# Patient Record
Sex: MALE | Race: WHITE | Employment: OTHER | URBAN - METROPOLITAN AREA
[De-identification: names, ages, dates, MRNs, and addresses within clinical notes are randomized per-mention and may not be internally consistent; named-entity substitution may affect disease eponyms.]

---

## 2017-04-11 RX ORDER — ATORVASTATIN CALCIUM 20 MG/1
TABLET, FILM COATED ORAL
Qty: 90 TABLET | Refills: 1 | Status: SHIPPED | OUTPATIENT
Start: 2017-04-11 | End: 2017-09-30 | Stop reason: SDUPTHER

## 2017-04-20 ENCOUNTER — OFFICE VISIT (OUTPATIENT)
Dept: INTERNAL MEDICINE CLINIC | Age: 82
End: 2017-04-20

## 2017-04-20 VITALS
OXYGEN SATURATION: 99 % | DIASTOLIC BLOOD PRESSURE: 80 MMHG | HEIGHT: 64 IN | WEIGHT: 126 LBS | SYSTOLIC BLOOD PRESSURE: 127 MMHG | HEART RATE: 85 BPM | BODY MASS INDEX: 21.51 KG/M2

## 2017-04-20 DIAGNOSIS — D63.1 ANEMIA IN CHRONIC KIDNEY DISEASE: ICD-10-CM

## 2017-04-20 DIAGNOSIS — E78.00 PURE HYPERCHOLESTEROLEMIA: ICD-10-CM

## 2017-04-20 DIAGNOSIS — I10 ESSENTIAL HYPERTENSION: Primary | ICD-10-CM

## 2017-04-20 DIAGNOSIS — I12.9 HYPERTENSIVE KIDNEY DISEASE WITH STAGE 3 CHRONIC KIDNEY DISEASE (HCC): ICD-10-CM

## 2017-04-20 DIAGNOSIS — J30.0 VASOMOTOR RHINITIS: ICD-10-CM

## 2017-04-20 DIAGNOSIS — N18.30 HYPERTENSIVE KIDNEY DISEASE WITH STAGE 3 CHRONIC KIDNEY DISEASE (HCC): ICD-10-CM

## 2017-04-20 DIAGNOSIS — E03.4 HYPOTHYROIDISM DUE TO ACQUIRED ATROPHY OF THYROID: ICD-10-CM

## 2017-04-20 DIAGNOSIS — N18.9 ANEMIA IN CHRONIC KIDNEY DISEASE: ICD-10-CM

## 2017-04-20 PROBLEM — H40.10X0 OPEN-ANGLE GLAUCOMA: Status: ACTIVE | Noted: 2017-04-20

## 2017-04-20 PROBLEM — H26.9 CATARACT, RIGHT EYE: Status: ACTIVE | Noted: 2017-04-20

## 2017-04-20 PROBLEM — H35.30 MACULAR DEGENERATION: Status: ACTIVE | Noted: 2017-04-20

## 2017-04-20 PROCEDURE — 99214 OFFICE O/P EST MOD 30 MIN: CPT | Performed by: INTERNAL MEDICINE

## 2017-04-20 RX ORDER — LATANOPROST 50 UG/ML
1 SOLUTION/ DROPS OPHTHALMIC NIGHTLY
COMMUNITY
End: 2021-10-29

## 2017-04-24 DIAGNOSIS — E78.00 PURE HYPERCHOLESTEROLEMIA: ICD-10-CM

## 2017-04-24 DIAGNOSIS — E03.4 HYPOTHYROIDISM DUE TO ACQUIRED ATROPHY OF THYROID: ICD-10-CM

## 2017-04-24 DIAGNOSIS — D63.1 ANEMIA IN CHRONIC KIDNEY DISEASE: ICD-10-CM

## 2017-04-24 DIAGNOSIS — N18.9 ANEMIA IN CHRONIC KIDNEY DISEASE: ICD-10-CM

## 2017-04-24 DIAGNOSIS — I10 ESSENTIAL HYPERTENSION: ICD-10-CM

## 2017-04-24 LAB
A/G RATIO: 1.8 (ref 1.1–2.2)
ALBUMIN SERPL-MCNC: 4.2 G/DL (ref 3.4–5)
ALP BLD-CCNC: 101 U/L (ref 40–129)
ALT SERPL-CCNC: <5 U/L (ref 10–40)
ANION GAP SERPL CALCULATED.3IONS-SCNC: 13 MMOL/L (ref 3–16)
AST SERPL-CCNC: 7 U/L (ref 15–37)
BASOPHILS ABSOLUTE: 0 K/UL (ref 0–0.2)
BASOPHILS RELATIVE PERCENT: 0.7 %
BILIRUB SERPL-MCNC: 0.8 MG/DL (ref 0–1)
BUN BLDV-MCNC: 16 MG/DL (ref 7–20)
CALCIUM SERPL-MCNC: 8.6 MG/DL (ref 8.3–10.6)
CHLORIDE BLD-SCNC: 109 MMOL/L (ref 99–110)
CHOLESTEROL, TOTAL: 163 MG/DL (ref 0–199)
CO2: 23 MMOL/L (ref 21–32)
CREAT SERPL-MCNC: 1.3 MG/DL (ref 0.8–1.3)
EOSINOPHILS ABSOLUTE: 0.2 K/UL (ref 0–0.6)
EOSINOPHILS RELATIVE PERCENT: 3.1 %
FERRITIN: 403.9 NG/ML (ref 30–400)
GFR AFRICAN AMERICAN: >60
GFR NON-AFRICAN AMERICAN: 53
GLOBULIN: 2.3 G/DL
GLUCOSE BLD-MCNC: 85 MG/DL (ref 70–99)
HCT VFR BLD CALC: 41.6 % (ref 40.5–52.5)
HDLC SERPL-MCNC: 38 MG/DL (ref 40–60)
HEMOGLOBIN: 13.4 G/DL (ref 13.5–17.5)
IRON SATURATION: 30 % (ref 20–50)
IRON: 72 UG/DL (ref 59–158)
LDL CHOLESTEROL CALCULATED: 90 MG/DL
LYMPHOCYTES ABSOLUTE: 0.9 K/UL (ref 1–5.1)
LYMPHOCYTES RELATIVE PERCENT: 17.1 %
MCH RBC QN AUTO: 29.5 PG (ref 26–34)
MCHC RBC AUTO-ENTMCNC: 32.1 G/DL (ref 31–36)
MCV RBC AUTO: 91.8 FL (ref 80–100)
MONOCYTES ABSOLUTE: 0.7 K/UL (ref 0–1.3)
MONOCYTES RELATIVE PERCENT: 12.2 %
NEUTROPHILS ABSOLUTE: 3.7 K/UL (ref 1.7–7.7)
NEUTROPHILS RELATIVE PERCENT: 66.9 %
PDW BLD-RTO: 14.9 % (ref 12.4–15.4)
PLATELET # BLD: 255 K/UL (ref 135–450)
PMV BLD AUTO: 8.3 FL (ref 5–10.5)
POTASSIUM SERPL-SCNC: 4.5 MMOL/L (ref 3.5–5.1)
RBC # BLD: 4.53 M/UL (ref 4.2–5.9)
SODIUM BLD-SCNC: 145 MMOL/L (ref 136–145)
TOTAL IRON BINDING CAPACITY: 240 UG/DL (ref 260–445)
TOTAL PROTEIN: 6.5 G/DL (ref 6.4–8.2)
TRIGL SERPL-MCNC: 177 MG/DL (ref 0–150)
TSH SERPL DL<=0.05 MIU/L-ACNC: 2.67 UIU/ML (ref 0.27–4.2)
VLDLC SERPL CALC-MCNC: 35 MG/DL
WBC # BLD: 5.5 K/UL (ref 4–11)

## 2017-05-12 RX ORDER — BENAZEPRIL HYDROCHLORIDE 40 MG/1
TABLET, FILM COATED ORAL
Qty: 90 TABLET | Refills: 1 | Status: SHIPPED | OUTPATIENT
Start: 2017-05-12 | End: 2017-11-02 | Stop reason: SDUPTHER

## 2017-07-06 RX ORDER — LEVOTHYROXINE SODIUM 0.05 MG/1
TABLET ORAL
Qty: 90 TABLET | Refills: 1 | Status: SHIPPED | OUTPATIENT
Start: 2017-07-06 | End: 2017-12-26

## 2017-10-02 RX ORDER — ATORVASTATIN CALCIUM 20 MG/1
TABLET, FILM COATED ORAL
Qty: 90 TABLET | Refills: 1 | Status: SHIPPED | OUTPATIENT
Start: 2017-10-02 | End: 2017-10-30

## 2017-10-24 ENCOUNTER — OFFICE VISIT (OUTPATIENT)
Dept: INTERNAL MEDICINE CLINIC | Age: 82
End: 2017-10-24

## 2017-10-24 VITALS
DIASTOLIC BLOOD PRESSURE: 78 MMHG | OXYGEN SATURATION: 100 % | BODY MASS INDEX: 21.12 KG/M2 | SYSTOLIC BLOOD PRESSURE: 138 MMHG | WEIGHT: 124 LBS | HEART RATE: 68 BPM

## 2017-10-24 DIAGNOSIS — E03.4 HYPOTHYROIDISM DUE TO ACQUIRED ATROPHY OF THYROID: ICD-10-CM

## 2017-10-24 DIAGNOSIS — I73.9 CLAUDICATION OF BOTH LOWER EXTREMITIES (HCC): ICD-10-CM

## 2017-10-24 DIAGNOSIS — I12.9 HYPERTENSIVE KIDNEY DISEASE WITH STAGE 3 CHRONIC KIDNEY DISEASE (HCC): ICD-10-CM

## 2017-10-24 DIAGNOSIS — I10 ESSENTIAL HYPERTENSION: Primary | ICD-10-CM

## 2017-10-24 DIAGNOSIS — E78.00 PURE HYPERCHOLESTEROLEMIA: ICD-10-CM

## 2017-10-24 DIAGNOSIS — M79.671 PAIN IN BOTH FEET: ICD-10-CM

## 2017-10-24 DIAGNOSIS — J30.0 CHRONIC VASOMOTOR RHINITIS: ICD-10-CM

## 2017-10-24 DIAGNOSIS — M79.672 PAIN IN BOTH FEET: ICD-10-CM

## 2017-10-24 DIAGNOSIS — N18.30 HYPERTENSIVE KIDNEY DISEASE WITH STAGE 3 CHRONIC KIDNEY DISEASE (HCC): ICD-10-CM

## 2017-10-24 PROBLEM — R20.2 PARESTHESIA OF BOTH FEET: Status: ACTIVE | Noted: 2017-10-24

## 2017-10-24 PROCEDURE — 1036F TOBACCO NON-USER: CPT | Performed by: INTERNAL MEDICINE

## 2017-10-24 PROCEDURE — 1123F ACP DISCUSS/DSCN MKR DOCD: CPT | Performed by: INTERNAL MEDICINE

## 2017-10-24 PROCEDURE — G8484 FLU IMMUNIZE NO ADMIN: HCPCS | Performed by: INTERNAL MEDICINE

## 2017-10-24 PROCEDURE — 99214 OFFICE O/P EST MOD 30 MIN: CPT | Performed by: INTERNAL MEDICINE

## 2017-10-24 PROCEDURE — G8427 DOCREV CUR MEDS BY ELIG CLIN: HCPCS | Performed by: INTERNAL MEDICINE

## 2017-10-24 PROCEDURE — G8420 CALC BMI NORM PARAMETERS: HCPCS | Performed by: INTERNAL MEDICINE

## 2017-10-24 PROCEDURE — 4040F PNEUMOC VAC/ADMIN/RCVD: CPT | Performed by: INTERNAL MEDICINE

## 2017-10-24 ASSESSMENT — PATIENT HEALTH QUESTIONNAIRE - PHQ9
1. LITTLE INTEREST OR PLEASURE IN DOING THINGS: 0
SUM OF ALL RESPONSES TO PHQ9 QUESTIONS 1 & 2: 0
2. FEELING DOWN, DEPRESSED OR HOPELESS: 0
SUM OF ALL RESPONSES TO PHQ QUESTIONS 1-9: 0

## 2017-10-24 NOTE — PROGRESS NOTES
Assessment/Plan     1. Essential hypertension  Slightly high today in the office. He will go to his local fire station for BP check intermittently- call if > 140/90. DASH diet provided. - Comprehensive Metabolic Panel, Fasting; Future    2. Hypertensive kidney disease with stage 3 chronic kidney disease  Stable. He will continue to avoid NSAIDs and other nephrotoxic agents. 3. Pure hypercholesterolemia  Tolerating current dose(s) of Lipitor- continue.   - Lipid, Fasting; Future    4. Hypothyroidism due to acquired atrophy of thyroid  Weight loss is likely multi-factorial, but will adjust dose of Synthroid if indicated by TSH result.  - TSH without Reflex; Future    5. Chronic vasomotor rhinitis  Only symptomatic during meals. He will ask ophthalmologist whether it is safe to use a nasal steroid or atrovent spray. 6. Pain in both feet  Etiology unclear- if vascular studies negative, will refer to podiatry. - Vitamin B12; Future    7. Claudication of both lower extremities ([facility] [address]  Will refer to vascular surgery if ABIs abnormal.  - VL Ankle Art Brachial Indices Extremity Bilateral; Future      Discussed medications with patient, who voiced understanding of their use and indications. All questions answered. Return in about 6 months (around 4/24/2018). Cindi Lema   YOB: 1935    Date of Visit:  10/24/2017      Prior to Visit Medications    Medication Sig Taking?  Authorizing Provider   atorvastatin (LIPITOR) 20 MG tablet TAKE 1 TABLET BY MOUTH EVERY DAY Yes Kaden Raymond MD   levothyroxine (SYNTHROID) 50 MCG tablet TAKE 1 TABLET BY MOUTH EVERY DAILY Yes Kaden Raymond MD   benazepril (LOTENSIN) 40 MG tablet TAKE 1 TABLET BY MOUTH EVERY DAY Yes Kaden Raymond MD   latanoprost (XALATAN) 0.005 % ophthalmic solution 1 drop nightly Yes Historical Provider, MD   Brimonidine Tartrate-Timolol (COMBIGAN OP) Apply to eye Yes Historical Provider, MD   Brinzolamide (AZOPT OP) Apply to eye Yes Historical Provider, MD   amLODIPine (NORVASC) 10 MG tablet TAKE 1 TABLET BY MOUTH DAILY. Yes Rajan Boggs MD       Vitals:    10/24/17 1020 10/24/17 1022 10/24/17 1058   BP: (!) 148/80 (!) 146/80 138/78   Site:   Right Arm   Position:   Sitting   Pulse: 68     SpO2: 100%     Weight: 124 lb (56.2 kg)       Body mass index is 21.12 kg/m². Wt Readings from Last 3 Encounters:   10/24/17 124 lb (56.2 kg)   04/20/17 126 lb (57.2 kg)   10/20/16 130 lb (59 kg)     BP Readings from Last 3 Encounters:   10/24/17 138/78   04/20/17 127/80   10/20/16 135/80     CC:  Patient presents for follow-up of   1. Essential hypertension    2. Hypertensive kidney disease with stage 3 chronic kidney disease    3. Pure hypercholesterolemia    4. Hypothyroidism due to acquired atrophy of thyroid    5. Chronic vasomotor rhinitis    6. Pain in both feet    7. Claudication of both lower extremities (HCC)         HPI  Hyperlipidemia:  No new myalgias or GI upset on higher dose of atorvastatin (Lipitor). Medication compliance: compliant all of the time. Patient is following a low fat, low cholesterol diet. He is exercising regularly. Lab Results   Component Value Date    CHOL 163 04/24/2017    TRIG 177 (H) 04/24/2017    HDL 38 (L) 04/24/2017    LDLCALC 90 04/24/2017     Lab Results   Component Value Date    ALT <5 (L) 04/24/2017    AST 7 (L) 04/24/2017        Hypertension:  Home blood pressure monitoring: No.  He is adherent to a low sodium diet. Patient denies chest pain, shortness of breath, headache, lightheadedness and palpitations. Antihypertensive medication side effects: no medication side effects noted. Use of agents associated with hypertension: none.                                         Sodium (mmol/L)   Date Value   04/24/2017 145    BUN (mg/dL)   Date Value   04/24/2017 16    Glucose (mg/dL)   Date Value   04/24/2017 85      Potassium (mmol/L)   Date Value   04/24/2017 4.5    CREATININE (mg/dL)   Date Value He has no cervical adenopathy. Neurological: He is alert and oriented to person, place, and time. Skin: Skin is warm, dry and intact. No rash noted. No erythema. Loss on hair on LEs. Psychiatric: He has a normal mood and affect.  His speech is normal and behavior is normal. Judgment and thought content normal. Cognition and memory are normal.

## 2017-10-24 NOTE — PATIENT INSTRUCTIONS
using beans and peas. Add garbanzo or kidney beans to salads. Make burritos and tacos with mashed courtney beans or black beans. Where can you learn more? Go to [email or web address] org and sign in to your [email or web address] account. Enter P020 in the [employer] box to learn more about \"DASH Diet: Care Instructions. \"     If you do not have an account, please click on the \"Sign Up Now\" link. Current as of: April 3, 2017  Content Version: 11.3  © 8891-5553 [employer]. Care instructions adapted under license by [facility] ([facility]). If you have questions about a medical condition or this instruction, always ask your healthcare professional. [address] any warranty or liability for your use of this information.      Healthy Choice frozen dinners  Boost nutritional supplement

## 2017-10-27 ENCOUNTER — HOSPITAL ENCOUNTER (OUTPATIENT)
Dept: VASCULAR LAB | Age: 82
Discharge: HOME | End: 2017-10-27
Attending: INTERNAL MEDICINE | Admitting: INTERNAL MEDICINE

## 2017-10-27 DIAGNOSIS — I73.9 PERIPHERAL VASCULAR DISEASE (HCC): ICD-10-CM

## 2017-10-27 DIAGNOSIS — M79.671 PAIN IN BOTH FEET: ICD-10-CM

## 2017-10-27 DIAGNOSIS — M79.672 PAIN IN BOTH FEET: ICD-10-CM

## 2017-10-27 DIAGNOSIS — E78.00 PURE HYPERCHOLESTEROLEMIA: ICD-10-CM

## 2017-10-27 DIAGNOSIS — I73.9 CLAUDICATION (HCC): Primary | ICD-10-CM

## 2017-10-27 DIAGNOSIS — I10 ESSENTIAL HYPERTENSION: ICD-10-CM

## 2017-10-27 DIAGNOSIS — E03.4 HYPOTHYROIDISM DUE TO ACQUIRED ATROPHY OF THYROID: ICD-10-CM

## 2017-10-27 PROBLEM — E53.8 B12 DEFICIENCY: Status: ACTIVE | Noted: 2017-10-27

## 2017-10-27 LAB
A/G RATIO: 1.7 (ref 1.1–2.2)
ALBUMIN SERPL-MCNC: 4.2 G/DL (ref 3.4–5)
ALP BLD-CCNC: 97 U/L (ref 40–129)
ALT SERPL-CCNC: 6 U/L (ref 10–40)
ANION GAP SERPL CALCULATED.3IONS-SCNC: 13 MMOL/L (ref 3–16)
AST SERPL-CCNC: 11 U/L (ref 15–37)
BILIRUB SERPL-MCNC: 0.8 MG/DL (ref 0–1)
BUN BLDV-MCNC: 20 MG/DL (ref 7–20)
CALCIUM SERPL-MCNC: 9 MG/DL (ref 8.3–10.6)
CHLORIDE BLD-SCNC: 105 MMOL/L (ref 99–110)
CHOLESTEROL, FASTING: 160 MG/DL (ref 0–199)
CO2: 25 MMOL/L (ref 21–32)
CREAT SERPL-MCNC: 1.3 MG/DL (ref 0.8–1.3)
GFR AFRICAN AMERICAN: >60
GFR NON-AFRICAN AMERICAN: 53
GLOBULIN: 2.5 G/DL
GLUCOSE FASTING: 81 MG/DL (ref 70–99)
HDLC SERPL-MCNC: 41 MG/DL (ref 40–60)
LDL CHOLESTEROL CALCULATED: 94 MG/DL
POTASSIUM SERPL-SCNC: 4.3 MMOL/L (ref 3.5–5.1)
SODIUM BLD-SCNC: 143 MMOL/L (ref 136–145)
TOTAL PROTEIN: 6.7 G/DL (ref 6.4–8.2)
TRIGLYCERIDE, FASTING: 126 MG/DL (ref 0–150)
TSH SERPL DL<=0.05 MIU/L-ACNC: 2.67 UIU/ML (ref 0.27–4.2)
VITAMIN B-12: 200 PG/ML (ref 211–911)
VLDLC SERPL CALC-MCNC: 25 MG/DL

## 2017-10-30 ENCOUNTER — TELEPHONE (OUTPATIENT)
Dept: INTERNAL MEDICINE CLINIC | Age: 82
End: 2017-10-30

## 2017-10-30 RX ORDER — ATORVASTATIN CALCIUM 20 MG/1
TABLET, FILM COATED ORAL
Qty: 90 TABLET | Refills: 1 | Status: SHIPPED | OUTPATIENT
Start: 2017-10-30 | End: 2018-09-07 | Stop reason: SDUPTHER

## 2017-10-30 RX ORDER — LANOLIN ALCOHOL/MO/W.PET/CERES
1000 CREAM (GRAM) TOPICAL DAILY
COMMUNITY

## 2017-11-03 RX ORDER — BENAZEPRIL HYDROCHLORIDE 40 MG/1
TABLET, FILM COATED ORAL
Qty: 90 TABLET | Refills: 1 | Status: SHIPPED | OUTPATIENT
Start: 2017-11-03 | End: 2018-04-26 | Stop reason: SDUPTHER

## 2017-12-26 RX ORDER — LEVOTHYROXINE SODIUM 0.05 MG/1
TABLET ORAL
Qty: 90 TABLET | Refills: 1 | Status: SHIPPED | OUTPATIENT
Start: 2017-12-26 | End: 2018-12-02

## 2018-01-23 ENCOUNTER — OFFICE VISIT (OUTPATIENT)
Dept: INTERNAL MEDICINE CLINIC | Age: 83
End: 2018-01-23

## 2018-01-23 VITALS
SYSTOLIC BLOOD PRESSURE: 126 MMHG | OXYGEN SATURATION: 99 % | HEART RATE: 78 BPM | TEMPERATURE: 97.8 F | DIASTOLIC BLOOD PRESSURE: 66 MMHG | WEIGHT: 128 LBS | BODY MASS INDEX: 21.8 KG/M2

## 2018-01-23 DIAGNOSIS — R68.89 COLD INTOLERANCE: ICD-10-CM

## 2018-01-23 DIAGNOSIS — L85.3 XEROSIS OF SKIN: ICD-10-CM

## 2018-01-23 DIAGNOSIS — L24.9 IRRITANT DERMATITIS: Primary | ICD-10-CM

## 2018-01-23 PROCEDURE — G8484 FLU IMMUNIZE NO ADMIN: HCPCS | Performed by: INTERNAL MEDICINE

## 2018-01-23 PROCEDURE — 4040F PNEUMOC VAC/ADMIN/RCVD: CPT | Performed by: INTERNAL MEDICINE

## 2018-01-23 PROCEDURE — G8420 CALC BMI NORM PARAMETERS: HCPCS | Performed by: INTERNAL MEDICINE

## 2018-01-23 PROCEDURE — 1036F TOBACCO NON-USER: CPT | Performed by: INTERNAL MEDICINE

## 2018-01-23 PROCEDURE — G8427 DOCREV CUR MEDS BY ELIG CLIN: HCPCS | Performed by: INTERNAL MEDICINE

## 2018-01-23 PROCEDURE — 1123F ACP DISCUSS/DSCN MKR DOCD: CPT | Performed by: INTERNAL MEDICINE

## 2018-01-23 PROCEDURE — 99214 OFFICE O/P EST MOD 30 MIN: CPT | Performed by: INTERNAL MEDICINE

## 2018-01-23 RX ORDER — MOMETASONE FUROATE 1 MG/G
CREAM TOPICAL
Qty: 1 TUBE | Refills: 0 | Status: SHIPPED | OUTPATIENT
Start: 2018-01-23 | End: 2019-04-30 | Stop reason: ALTCHOICE

## 2018-04-20 ENCOUNTER — OFFICE VISIT (OUTPATIENT)
Dept: INTERNAL MEDICINE CLINIC | Age: 83
End: 2018-04-20

## 2018-04-20 VITALS
WEIGHT: 131 LBS | DIASTOLIC BLOOD PRESSURE: 78 MMHG | SYSTOLIC BLOOD PRESSURE: 126 MMHG | BODY MASS INDEX: 22.31 KG/M2 | HEART RATE: 72 BPM

## 2018-04-20 DIAGNOSIS — I10 ESSENTIAL HYPERTENSION: ICD-10-CM

## 2018-04-20 DIAGNOSIS — N18.30 HYPERTENSIVE KIDNEY DISEASE WITH STAGE 3 CHRONIC KIDNEY DISEASE (HCC): ICD-10-CM

## 2018-04-20 DIAGNOSIS — E78.00 PURE HYPERCHOLESTEROLEMIA: Primary | ICD-10-CM

## 2018-04-20 DIAGNOSIS — E03.4 HYPOTHYROIDISM DUE TO ACQUIRED ATROPHY OF THYROID: ICD-10-CM

## 2018-04-20 DIAGNOSIS — E53.8 B12 DEFICIENCY: ICD-10-CM

## 2018-04-20 DIAGNOSIS — I12.9 HYPERTENSIVE KIDNEY DISEASE WITH STAGE 3 CHRONIC KIDNEY DISEASE (HCC): ICD-10-CM

## 2018-04-20 PROBLEM — R20.2 PARESTHESIA OF BOTH FEET: Status: RESOLVED | Noted: 2017-10-24 | Resolved: 2018-04-20

## 2018-04-20 PROCEDURE — 4040F PNEUMOC VAC/ADMIN/RCVD: CPT | Performed by: INTERNAL MEDICINE

## 2018-04-20 PROCEDURE — 1036F TOBACCO NON-USER: CPT | Performed by: INTERNAL MEDICINE

## 2018-04-20 PROCEDURE — G8427 DOCREV CUR MEDS BY ELIG CLIN: HCPCS | Performed by: INTERNAL MEDICINE

## 2018-04-20 PROCEDURE — 99214 OFFICE O/P EST MOD 30 MIN: CPT | Performed by: INTERNAL MEDICINE

## 2018-04-20 PROCEDURE — 1123F ACP DISCUSS/DSCN MKR DOCD: CPT | Performed by: INTERNAL MEDICINE

## 2018-04-20 PROCEDURE — G8420 CALC BMI NORM PARAMETERS: HCPCS | Performed by: INTERNAL MEDICINE

## 2018-04-20 RX ORDER — DOCUSATE SODIUM 100 MG/1
100 CAPSULE, LIQUID FILLED ORAL 2 TIMES DAILY
COMMUNITY

## 2018-04-24 ENCOUNTER — TELEPHONE (OUTPATIENT)
Dept: INTERNAL MEDICINE CLINIC | Age: 83
End: 2018-04-24

## 2018-04-26 RX ORDER — BENAZEPRIL HYDROCHLORIDE 40 MG/1
TABLET, FILM COATED ORAL
Qty: 90 TABLET | Refills: 1 | Status: SHIPPED | OUTPATIENT
Start: 2018-04-26 | End: 2018-10-20 | Stop reason: SDUPTHER

## 2018-09-07 RX ORDER — ATORVASTATIN CALCIUM 20 MG/1
TABLET, FILM COATED ORAL
Qty: 90 TABLET | Refills: 1 | Status: SHIPPED | OUTPATIENT
Start: 2018-09-07 | End: 2019-02-25 | Stop reason: SDUPTHER

## 2018-09-27 ENCOUNTER — HOSPITAL ENCOUNTER (OUTPATIENT)
Dept: NEUROLOGY | Age: 83
Discharge: HOME | End: 2018-09-27
Payer: MEDICARE

## 2018-09-27 PROCEDURE — 95861 NEEDLE EMG 2 EXTREMITIES: CPT

## 2018-09-27 PROCEDURE — 95909 NRV CNDJ TST 5-6 STUDIES: CPT

## 2018-09-27 PROCEDURE — 95886 MUSC TEST DONE W/N TEST COMP: CPT | Performed by: PSYCHIATRY & NEUROLOGY

## 2018-09-27 PROCEDURE — 95909 NRV CNDJ TST 5-6 STUDIES: CPT | Performed by: PSYCHIATRY & NEUROLOGY

## 2018-10-20 RX ORDER — BENAZEPRIL HYDROCHLORIDE 40 MG/1
TABLET, FILM COATED ORAL
Qty: 90 TABLET | Refills: 1 | Status: SHIPPED | OUTPATIENT
Start: 2018-10-20 | End: 2019-04-13 | Stop reason: SDUPTHER

## 2018-10-26 ENCOUNTER — OFFICE VISIT (OUTPATIENT)
Dept: INTERNAL MEDICINE CLINIC | Age: 83
End: 2018-10-26
Payer: MEDICARE

## 2018-10-26 VITALS
OXYGEN SATURATION: 98 % | WEIGHT: 124 LBS | SYSTOLIC BLOOD PRESSURE: 136 MMHG | BODY MASS INDEX: 21.17 KG/M2 | HEIGHT: 64 IN | HEART RATE: 80 BPM | DIASTOLIC BLOOD PRESSURE: 80 MMHG

## 2018-10-26 DIAGNOSIS — R63.4 WEIGHT LOSS: ICD-10-CM

## 2018-10-26 DIAGNOSIS — E03.4 HYPOTHYROIDISM DUE TO ACQUIRED ATROPHY OF THYROID: ICD-10-CM

## 2018-10-26 DIAGNOSIS — E78.00 PURE HYPERCHOLESTEROLEMIA: ICD-10-CM

## 2018-10-26 DIAGNOSIS — N18.30 HYPERTENSIVE KIDNEY DISEASE WITH STAGE 3 CHRONIC KIDNEY DISEASE (HCC): ICD-10-CM

## 2018-10-26 DIAGNOSIS — I10 ESSENTIAL HYPERTENSION: Primary | ICD-10-CM

## 2018-10-26 DIAGNOSIS — I10 ESSENTIAL HYPERTENSION: ICD-10-CM

## 2018-10-26 DIAGNOSIS — I12.9 HYPERTENSIVE KIDNEY DISEASE WITH STAGE 3 CHRONIC KIDNEY DISEASE (HCC): ICD-10-CM

## 2018-10-26 LAB
A/G RATIO: 1.8 (ref 1.1–2.2)
ALBUMIN SERPL-MCNC: 4.4 G/DL (ref 3.4–5)
ALP BLD-CCNC: 108 U/L (ref 40–129)
ALT SERPL-CCNC: <5 U/L (ref 10–40)
ANION GAP SERPL CALCULATED.3IONS-SCNC: 15 MMOL/L (ref 3–16)
AST SERPL-CCNC: 7 U/L (ref 15–37)
BILIRUB SERPL-MCNC: 0.7 MG/DL (ref 0–1)
BUN BLDV-MCNC: 21 MG/DL (ref 7–20)
CALCIUM SERPL-MCNC: 9.1 MG/DL (ref 8.3–10.6)
CHLORIDE BLD-SCNC: 109 MMOL/L (ref 99–110)
CHOLESTEROL, FASTING: 167 MG/DL (ref 0–199)
CO2: 23 MMOL/L (ref 21–32)
CREAT SERPL-MCNC: 1.6 MG/DL (ref 0.8–1.3)
GFR AFRICAN AMERICAN: 50
GFR NON-AFRICAN AMERICAN: 41
GLOBULIN: 2.5 G/DL
GLUCOSE FASTING: 92 MG/DL (ref 70–99)
HDLC SERPL-MCNC: 44 MG/DL (ref 40–60)
LDL CHOLESTEROL CALCULATED: 99 MG/DL
POTASSIUM SERPL-SCNC: 5 MMOL/L (ref 3.5–5.1)
SODIUM BLD-SCNC: 147 MMOL/L (ref 136–145)
TOTAL PROTEIN: 6.9 G/DL (ref 6.4–8.2)
TRIGLYCERIDE, FASTING: 118 MG/DL (ref 0–150)
TSH SERPL DL<=0.05 MIU/L-ACNC: 2.35 UIU/ML (ref 0.27–4.2)
VLDLC SERPL CALC-MCNC: 24 MG/DL

## 2018-10-26 PROCEDURE — G8482 FLU IMMUNIZE ORDER/ADMIN: HCPCS | Performed by: INTERNAL MEDICINE

## 2018-10-26 PROCEDURE — G8427 DOCREV CUR MEDS BY ELIG CLIN: HCPCS | Performed by: INTERNAL MEDICINE

## 2018-10-26 PROCEDURE — 4040F PNEUMOC VAC/ADMIN/RCVD: CPT | Performed by: INTERNAL MEDICINE

## 2018-10-26 PROCEDURE — 1036F TOBACCO NON-USER: CPT | Performed by: INTERNAL MEDICINE

## 2018-10-26 PROCEDURE — 1101F PT FALLS ASSESS-DOCD LE1/YR: CPT | Performed by: INTERNAL MEDICINE

## 2018-10-26 PROCEDURE — 1123F ACP DISCUSS/DSCN MKR DOCD: CPT | Performed by: INTERNAL MEDICINE

## 2018-10-26 PROCEDURE — 99214 OFFICE O/P EST MOD 30 MIN: CPT | Performed by: INTERNAL MEDICINE

## 2018-10-26 PROCEDURE — G8420 CALC BMI NORM PARAMETERS: HCPCS | Performed by: INTERNAL MEDICINE

## 2018-10-26 RX ORDER — LANOLIN ALCOHOL/MO/W.PET/CERES
3 CREAM (GRAM) TOPICAL DAILY
COMMUNITY
Start: 2018-10-26 | End: 2019-04-30

## 2018-10-26 ASSESSMENT — PATIENT HEALTH QUESTIONNAIRE - PHQ9
1. LITTLE INTEREST OR PLEASURE IN DOING THINGS: 0
SUM OF ALL RESPONSES TO PHQ QUESTIONS 1-9: 0
2. FEELING DOWN, DEPRESSED OR HOPELESS: 0
SUM OF ALL RESPONSES TO PHQ QUESTIONS 1-9: 0
SUM OF ALL RESPONSES TO PHQ9 QUESTIONS 1 & 2: 0

## 2018-10-26 NOTE — PROGRESS NOTES
Assessment/Plan     1. Essential hypertension  Well-controlled. Continue current medications. - Comprehensive Metabolic Panel, Fasting; Future    2. Pure hypercholesterolemia  Tolerating current dose(s) of Lipitor- continue.  - Lipid, Fasting; Future    3. Hypertensive kidney disease with stage 3 chronic kidney disease ([address]  Taking Advil PM nightly, which he was advised to discontinue  Tylenol okay for pain. Melatonin recommended if needs sleep aid. 4. Hypothyroidism due to acquired atrophy of thyroid  Weight loss is likely multifactorial, but will adjust levothyroxine dose if indicated by lab results.   - TSH without Reflex; Future      Return in about 6 months (around 4/26/2019). Satish Argueta   YOB: 1935    Date of Visit:  10/26/2018    No Known Allergies   Prior to Visit Medications    Medication Sig Taking? Authorizing Provider   benazepril (LOTENSIN) 40 MG tablet TAKE 1 TABLET BY MOUTH EVERY DAY Yes Alia Flores MD   atorvastatin (LIPITOR) 20 MG tablet TAKE 1 TABLET BY MOUTH EVERY DAY Yes Alia Flores MD   Lutein-Zeaxanthin (OCUVITE LUTEIN 25 PO) Take by mouth Yes Historical Provider, MD   docusate sodium (COLACE) 100 MG capsule Take 100 mg by mouth 2 times daily Yes Historical Provider, MD   levothyroxine (SYNTHROID) 50 MCG tablet TAKE 1 TABLET BY MOUTH EVERY DAILY Yes Alia Flores MD   vitamin B-12 (CYANOCOBALAMIN) 1000 MCG tablet Take 1,000 mcg by mouth daily Yes Historical Provider, MD   latanoprost (XALATAN) 0.005 % ophthalmic solution 1 drop nightly Yes Historical Provider, MD   Brimonidine Tartrate-Timolol (COMBIGAN OP) Apply to eye Yes Historical Provider, MD   Brinzolamide (AZOPT OP) Apply to eye Yes Historical Provider, MD   Multiple Vitamins-Minerals (ICAPS AREDS 2 PO) Take by mouth  Historical Provider, MD   mometasone (ELOCON) 0.1 % cream Apply thin layer to affected areas topically 2x/day up to 2 weeks. Avoid use on face and groin.   Alia Flores

## 2018-10-30 ENCOUNTER — TELEPHONE (OUTPATIENT)
Dept: INTERNAL MEDICINE CLINIC | Age: 83
End: 2018-10-30

## 2018-11-01 ENCOUNTER — TELEPHONE (OUTPATIENT)
Dept: INTERNAL MEDICINE CLINIC | Age: 83
End: 2018-11-01

## 2018-11-01 RX ORDER — TRAZODONE HYDROCHLORIDE 50 MG/1
50 TABLET ORAL NIGHTLY
Qty: 30 TABLET | Refills: 2 | Status: SHIPPED | OUTPATIENT
Start: 2018-11-01 | End: 2019-04-30 | Stop reason: SDUPTHER

## 2018-12-02 RX ORDER — LEVOTHYROXINE SODIUM 0.05 MG/1
TABLET ORAL
Qty: 90 TABLET | Refills: 1 | Status: SHIPPED | OUTPATIENT
Start: 2018-12-02 | End: 2019-05-23

## 2019-02-26 RX ORDER — ATORVASTATIN CALCIUM 20 MG/1
TABLET, FILM COATED ORAL
Qty: 90 TABLET | Refills: 1 | Status: SHIPPED | OUTPATIENT
Start: 2019-02-26 | End: 2019-08-22 | Stop reason: SDUPTHER

## 2019-04-14 RX ORDER — BENAZEPRIL HYDROCHLORIDE 40 MG/1
TABLET, FILM COATED ORAL
Qty: 90 TABLET | Refills: 1 | Status: SHIPPED | OUTPATIENT
Start: 2019-04-14 | End: 2019-10-14 | Stop reason: SDUPTHER

## 2019-04-30 ENCOUNTER — OFFICE VISIT (OUTPATIENT)
Dept: INTERNAL MEDICINE CLINIC | Age: 84
End: 2019-04-30
Payer: MEDICARE

## 2019-04-30 VITALS
SYSTOLIC BLOOD PRESSURE: 120 MMHG | OXYGEN SATURATION: 97 % | DIASTOLIC BLOOD PRESSURE: 64 MMHG | BODY MASS INDEX: 21.8 KG/M2 | HEART RATE: 72 BPM | WEIGHT: 127 LBS

## 2019-04-30 DIAGNOSIS — E03.4 HYPOTHYROIDISM DUE TO ACQUIRED ATROPHY OF THYROID: ICD-10-CM

## 2019-04-30 DIAGNOSIS — I12.9 HYPERTENSIVE KIDNEY DISEASE WITH STAGE 3 CHRONIC KIDNEY DISEASE (HCC): ICD-10-CM

## 2019-04-30 DIAGNOSIS — N18.30 HYPERTENSIVE KIDNEY DISEASE WITH STAGE 3 CHRONIC KIDNEY DISEASE (HCC): ICD-10-CM

## 2019-04-30 DIAGNOSIS — I10 ESSENTIAL HYPERTENSION: Primary | ICD-10-CM

## 2019-04-30 DIAGNOSIS — E78.00 PURE HYPERCHOLESTEROLEMIA: ICD-10-CM

## 2019-04-30 DIAGNOSIS — F51.01 PRIMARY INSOMNIA: ICD-10-CM

## 2019-04-30 PROCEDURE — 4040F PNEUMOC VAC/ADMIN/RCVD: CPT | Performed by: INTERNAL MEDICINE

## 2019-04-30 PROCEDURE — G8420 CALC BMI NORM PARAMETERS: HCPCS | Performed by: INTERNAL MEDICINE

## 2019-04-30 PROCEDURE — 1036F TOBACCO NON-USER: CPT | Performed by: INTERNAL MEDICINE

## 2019-04-30 PROCEDURE — 99214 OFFICE O/P EST MOD 30 MIN: CPT | Performed by: INTERNAL MEDICINE

## 2019-04-30 PROCEDURE — G8427 DOCREV CUR MEDS BY ELIG CLIN: HCPCS | Performed by: INTERNAL MEDICINE

## 2019-04-30 PROCEDURE — 1123F ACP DISCUSS/DSCN MKR DOCD: CPT | Performed by: INTERNAL MEDICINE

## 2019-04-30 RX ORDER — TRAZODONE HYDROCHLORIDE 50 MG/1
TABLET ORAL
Qty: 30 TABLET | Refills: 2 | Status: SHIPPED | OUTPATIENT
Start: 2019-04-30 | End: 2019-04-30 | Stop reason: SDUPTHER

## 2019-04-30 RX ORDER — TRAZODONE HYDROCHLORIDE 50 MG/1
TABLET ORAL
Qty: 90 TABLET | Refills: 1 | Status: SHIPPED | OUTPATIENT
Start: 2019-04-30 | End: 2019-08-16

## 2019-04-30 ASSESSMENT — PATIENT HEALTH QUESTIONNAIRE - PHQ9
SUM OF ALL RESPONSES TO PHQ9 QUESTIONS 1 & 2: 0
1. LITTLE INTEREST OR PLEASURE IN DOING THINGS: 0
SUM OF ALL RESPONSES TO PHQ QUESTIONS 1-9: 0
2. FEELING DOWN, DEPRESSED OR HOPELESS: 0
SUM OF ALL RESPONSES TO PHQ QUESTIONS 1-9: 0

## 2019-04-30 NOTE — PROGRESS NOTES
Assessment/Plan      1. Essential hypertension  Well-controlled. Continue current medications. - Comprehensive Metabolic Panel, Fasting; Future    2. Hypertensive kidney disease with stage 3 chronic kidney disease (HCC)  Recent exacerbation likely due to prior regular use of Advil pm, which he has discontinued. He     3. Pure hypercholesterolemia  Tolerating current dose(s) of Lipitor- continue.   - Lipid, Fasting; Future    4. Hypothyroidism due to acquired atrophy of thyroid  Clinically euthyroid, but will adjust levothyroxine dose if indicated by lab results.  - TSH without Reflex; Future    5. Primary insomnia  Dangers of anticholinergic drugs, such as Unisom discussed- may be increasing his visual hallucinations related to his visual loss. Since melatonin ineffective, so he agrees to try 25-50 mg trazodone qhs. Principles of good sleep hygiene discussed. Return in about 6 months (around 10/30/2019). Lino Caldwell   YOB: 1935    Date of Visit:  4/30/2019    No Known Allergies   Prior to Visit Medications    Medication Sig Taking?  Authorizing Provider   Doxylamine Succinate, Sleep, (UNISOM PO) Take by mouth Yes Historical Provider, MD   benazepril (LOTENSIN) 40 MG tablet TAKE 1 TABLET BY MOUTH EVERY DAY Yes Inocencia Bartlett MD   atorvastatin (LIPITOR) 20 MG tablet TAKE 1 TABLET BY MOUTH EVERY DAY Yes Inocencia Bartlett MD   levothyroxine (SYNTHROID) 50 MCG tablet TAKE 1 TABLET BY MOUTH EVERY DAILY Yes Inocencia Bartlett MD   traZODone (DESYREL) 50 MG tablet Take 1 tablet by mouth nightly Yes Inocencia Bartlett MD   Multiple Vitamins-Minerals (ICAPS AREDS 2 PO) Take by mouth Yes Historical Provider, MD   Lutein-Zeaxanthin (OCUVITE LUTEIN 25 PO) Take by mouth Yes Historical Provider, MD   docusate sodium (COLACE) 100 MG capsule Take 100 mg by mouth 2 times daily Yes Historical Provider, MD   vitamin B-12 (CYANOCOBALAMIN) 1000 MCG tablet Take 1,000 mcg by mouth daily Yes Historical Provider, MD   latanoprost (XALATAN) 0.005 % ophthalmic solution 1 drop nightly Yes Historical Provider, MD   Brimonidine Tartrate-Timolol (COMBIGAN OP) Apply to eye Yes Historical Provider, MD   Brinzolamide (AZOPT OP) Apply to eye Yes Historical Provider, MD   melatonin (RA MELATONIN) 3 MG TABS tablet Take 1 tablet by mouth daily  Lee Reyes MD       Vitals:    04/30/19 1102   BP: 120/64   Pulse: 72   SpO2: 97%   Weight: 127 lb (57.6 kg)      Body mass index is 21.8 kg/m². Wt Readings from Last 3 Encounters:   04/30/19 127 lb (57.6 kg)   10/26/18 124 lb (56.2 kg)   04/20/18 131 lb (59.4 kg)     BP Readings from Last 3 Encounters:   04/30/19 120/64   10/26/18 136/80   04/20/18 126/78       CC:  Patient presents for re-evaluation of the following medical concerns     HPI  Hypertension/CKD:  Home blood pressure monitoring: No.  He is adherent to a low sodium diet. Patient denies chest pain, shortness of breath, headache, lightheadedness and palpitations. Antihypertensive medication side effects: no medication side effects noted. Use of agents associated with hypertension: none. Hyperlipidemia:  No new myalgias or GI upset on higher dose of atorvastatin (Lipitor). Medication compliance: compliant all of the time. Patient is not following a low fat, low cholesterol diet. He is exercising regularly- walking dog 4x/day. Hypothyroidism: Recent symptoms:  None. He denies fatigue, weight gain, palpitations, constipation and diarrhea. Patient is taking his medication consistently on an empty stomach. Insomnia:  Current treatment: Unisom 1 qhs, which has been effective. Medication side effects: increase in visual hallucinations. He forgot to  the trazodone at his pharmacy.     Lab Results   Component Value Date    LDLCALC 99 10/26/2018    LDLCALC 94 10/27/2017    TRIGLYCFAST 118 10/26/2018    TRIGLYCFAST 126 10/27/2017    TRIG 177 (H) 04/24/2017    HDL 44 10/26/2018     Lab Results   Component Value Date    GLUF 92 10/26/2018    GLUCOSE 85 2017     Lab Results   Component Value Date     (H) 10/26/2018    K 5.0 10/26/2018    BUN 21 (H) 10/26/2018    CREATININE 1.6 (H) 10/26/2018    LABGLOM 41 (A) 10/26/2018    GFRAA 50 (A) 10/26/2018    CALCIUM 9.1 10/26/2018     Lab Results   Component Value Date    ALT <5 (L) 10/26/2018    AST 7 (L) 10/26/2018     Lab Results   Component Value Date    HGB 13.4 (L) 2017     Lab Results   Component Value Date    TSHREFLEX 1.98 2013    TSH 2.35 10/26/2018        Review of Systems  As documented inHPI    Social History     Tobacco Use    Smoking status: Former Smoker     Last attempt to quit: 1981     Years since quittin.2    Smokeless tobacco: Never Used   Substance Use Topics    Alcohol use: No     Comment: past history of alcoholism, quit 1981        Physical Exam   Constitutional: He is oriented to person, place, and time. He appears well-developed and well-nourished. No distress. HENT:   Mouth/Throat: Oropharynx is clear and moist and mucous membranes are normal.   Eyes: Conjunctivae are normal.   Left eye artificial   Neck: Neck supple. No JVD present. Carotid bruit is not present. No thyroid mass and no thyromegaly present. Cardiovascular: Normal rate, regular rhythm, normal heart sounds and intact distal pulses. Exam reveals no gallop and no friction rub. No murmur heard. Pulmonary/Chest: Effort normal and breath sounds normal. No respiratory distress. He has no wheezes. He has no rhonchi. He has no rales. Abdominal: Soft. He exhibits no distension. There is no tenderness. Musculoskeletal: He exhibits no edema. Lymphadenopathy:     He has no cervical adenopathy. Neurological: He is alert and oriented to person, place, and time. Skin: Skin is warm, dry and intact. No rash noted. No erythema. Psychiatric: He has a normal mood and affect.  His speech is normal and behavior is normal. Judgment and thought content normal. Cognition and memory are normal.

## 2019-05-01 ENCOUNTER — TELEPHONE (OUTPATIENT)
Dept: INTERNAL MEDICINE CLINIC | Age: 84
End: 2019-05-01

## 2019-05-01 NOTE — TELEPHONE ENCOUNTER
Patient states he's returning a call to the office regarding his lab results. Per Dr. Angelique Perez, I informed patient of the following:  Notes recorded by Pina Obrien MD on 5/1/2019 at 12:38 PM EDT  Thyroid function test is normal.  Continue current dose of Synthroid.  Liver function and electrolytes look fine. Dicie Masodo function is slightly better with discontinuation of Advil pm.  Blood sugar is slightly elevated, but nothing to worry about.  Cholesterol panel looks fine.  Continue current dose of Lipitor.  I will release results to [employer] once patient has been notified. Patient voiced understanding and has no additional questions. Please release results to my chart.     Phone Encounter Completed unless you need patient for something additional.

## 2019-05-23 RX ORDER — LEVOTHYROXINE SODIUM 0.05 MG/1
TABLET ORAL
Qty: 90 TABLET | Refills: 1 | Status: SHIPPED | OUTPATIENT
Start: 2019-05-23 | End: 2019-12-30 | Stop reason: SDUPTHER

## 2019-08-16 ENCOUNTER — TELEPHONE (OUTPATIENT)
Dept: INTERNAL MEDICINE CLINIC | Age: 84
End: 2019-08-16

## 2019-08-16 RX ORDER — MIRTAZAPINE 7.5 MG/1
7.5 TABLET, FILM COATED ORAL NIGHTLY
Qty: 90 TABLET | Refills: 0 | Status: SHIPPED | OUTPATIENT
Start: 2019-08-16 | End: 2019-10-08 | Stop reason: SDUPTHER

## 2019-08-16 NOTE — TELEPHONE ENCOUNTER
Ambien is contraindicated in his age group due to risk of falls and confusion/memory issues. Is he taking the trazodone?

## 2019-08-22 ENCOUNTER — TELEPHONE (OUTPATIENT)
Dept: INTERNAL MEDICINE CLINIC | Age: 84
End: 2019-08-22

## 2019-08-22 DIAGNOSIS — F51.01 PRIMARY INSOMNIA: Primary | ICD-10-CM

## 2019-08-22 RX ORDER — ATORVASTATIN CALCIUM 20 MG/1
TABLET, FILM COATED ORAL
Qty: 90 TABLET | Refills: 1 | Status: SHIPPED | OUTPATIENT
Start: 2019-08-22 | End: 2020-01-27 | Stop reason: SDUPTHER

## 2019-08-22 NOTE — TELEPHONE ENCOUNTER
Spoke with office he is on cancel list and will contact physician and then contact patient.   Patient aware of what is going on

## 2019-08-26 ENCOUNTER — OFFICE VISIT (OUTPATIENT)
Dept: PULMONOLOGY | Age: 84
End: 2019-08-26
Payer: MEDICARE

## 2019-08-26 VITALS
HEART RATE: 77 BPM | DIASTOLIC BLOOD PRESSURE: 85 MMHG | BODY MASS INDEX: 21.11 KG/M2 | SYSTOLIC BLOOD PRESSURE: 132 MMHG | OXYGEN SATURATION: 98 % | WEIGHT: 123 LBS

## 2019-08-26 DIAGNOSIS — F51.01 PRIMARY INSOMNIA: Primary | ICD-10-CM

## 2019-08-26 DIAGNOSIS — I10 ESSENTIAL HYPERTENSION: Chronic | ICD-10-CM

## 2019-08-26 DIAGNOSIS — E03.4 HYPOTHYROIDISM DUE TO ACQUIRED ATROPHY OF THYROID: Chronic | ICD-10-CM

## 2019-08-26 DIAGNOSIS — K21.9 GASTROESOPHAGEAL REFLUX DISEASE, ESOPHAGITIS PRESENCE NOT SPECIFIED: Chronic | ICD-10-CM

## 2019-08-26 PROCEDURE — 4040F PNEUMOC VAC/ADMIN/RCVD: CPT | Performed by: INTERNAL MEDICINE

## 2019-08-26 PROCEDURE — 99204 OFFICE O/P NEW MOD 45 MIN: CPT | Performed by: INTERNAL MEDICINE

## 2019-08-26 PROCEDURE — G8427 DOCREV CUR MEDS BY ELIG CLIN: HCPCS | Performed by: INTERNAL MEDICINE

## 2019-08-26 PROCEDURE — G8420 CALC BMI NORM PARAMETERS: HCPCS | Performed by: INTERNAL MEDICINE

## 2019-08-26 PROCEDURE — 1123F ACP DISCUSS/DSCN MKR DOCD: CPT | Performed by: INTERNAL MEDICINE

## 2019-08-26 PROCEDURE — 1036F TOBACCO NON-USER: CPT | Performed by: INTERNAL MEDICINE

## 2019-08-26 RX ORDER — TRAZODONE HYDROCHLORIDE 100 MG/1
100 TABLET ORAL NIGHTLY
COMMUNITY
End: 2019-10-07

## 2019-08-26 ASSESSMENT — SLEEP AND FATIGUE QUESTIONNAIRES
HOW LIKELY ARE YOU TO NOD OFF OR FALL ASLEEP WHILE WATCHING TV: 0
NECK CIRCUMFERENCE (INCHES): 15
HOW LIKELY ARE YOU TO NOD OFF OR FALL ASLEEP WHILE SITTING AND READING: 0
ESS TOTAL SCORE: 2
HOW LIKELY ARE YOU TO NOD OFF OR FALL ASLEEP IN A CAR, WHILE STOPPED FOR A FEW MINUTES IN TRAFFIC: 0
HOW LIKELY ARE YOU TO NOD OFF OR FALL ASLEEP WHILE SITTING AND TALKING TO SOMEONE: 0
HOW LIKELY ARE YOU TO NOD OFF OR FALL ASLEEP WHILE SITTING INACTIVE IN A PUBLIC PLACE: 0
HOW LIKELY ARE YOU TO NOD OFF OR FALL ASLEEP WHILE SITTING QUIETLY AFTER LUNCH WITHOUT ALCOHOL: 1
HOW LIKELY ARE YOU TO NOD OFF OR FALL ASLEEP WHILE LYING DOWN TO REST IN THE AFTERNOON WHEN CIRCUMSTANCES PERMIT: 1
HOW LIKELY ARE YOU TO NOD OFF OR FALL ASLEEP WHEN YOU ARE A PASSENGER IN A CAR FOR AN HOUR WITHOUT A BREAK: 0

## 2019-08-26 ASSESSMENT — ENCOUNTER SYMPTOMS
ALLERGIC/IMMUNOLOGIC NEGATIVE: 1
PHOTOPHOBIA: 0
RHINORRHEA: 0
VOMITING: 0
CHOKING: 0
ABDOMINAL DISTENTION: 0
NAUSEA: 0
SHORTNESS OF BREATH: 0
CHEST TIGHTNESS: 0
EYE PAIN: 0
ABDOMINAL PAIN: 0
APNEA: 0

## 2019-08-26 NOTE — LETTER
[facility]  [address] [place] [address]  Phone: [phone]  Fax: [phone]      August 26, 2019       Patient: Maryse Catherine   MR Number: [id number]   YOB: 1935   Date of Visit: 8/26/2019     Thank you for allowing me to participate in the care of Charity Zuniga. Here is my assessment and plan. Also attached is a copy of his consult note:    ASSESSMENT:  Visit Diagnoses and Associated Orders     Primary insomnia   (New Problem)  -  Primary    Needs work-up/treatment    traZODone (DESYREL) 100 MG tablet [8083]           Essential hypertension   (Stable)           Gastroesophageal reflux disease, esophagitis presence not specified   (Stable)           Hypothyroidism due to acquired atrophy of thyroid   (Stable)                 Plan:  We discussed insomnia and different treatment options. I explained to the patient that I agree with Dr. Janet Enrique that Ambien and benzodiazepines should be avoided. We also discussed the risks of long-term antihistamine use. We discussed setting goals for the treatment of his insomnia. Given that patient is sleeping less than 6 hours he does not appear to be fatigued or sleepy during the daytime nor is requiring naps most days. We structured the goal of treatment by looking at his functional status in the daytime and not so much the actual hours he is sleeping each night. There appears to be a strong component of anxiety given his rapid loss and vision in question if but daily medication for anxiety would be beneficial.  The patient is to continue his trazodone at 200 mg and then  the prescription for mirtazapine and start at 7.5 mg but can increase up to 15mg. We will see him back in a 3-month follow-up to see how he is doing on the medications and decide further work-up or changes in meds from there. Continue meds for: HTN, GERD, and hypothyroid.  Pt would medically benefit from wt loss for BRADY (diet, exercise, surgical). If you have questions or concerns, please do not hesitate to call me. I look forward to following Lalo along with you.     Sincerely,      Ashley Garces MD    CC providers:  Lorrie Moreno MD  [address] [facility] [address]

## 2019-08-26 NOTE — PROGRESS NOTES
Leonel Rose MD, FAA, [facility]  [facility] HEART AND SURGICAL [facility]  [facility]  [address] [facility], [address] [phone]   [address] [place] [address] Анна Rosen. Deepa [address] [phone] [address] [address] [address] [phone]    Assessment:      Visit Diagnoses and Associated Orders     Primary insomnia   (New Problem)  -  Primary    Needs work-up/treatment    traZODone (DESYREL) 100 MG tablet [8083]           Essential hypertension   (Stable)           Gastroesophageal reflux disease, esophagitis presence not specified   (Stable)           Hypothyroidism due to acquired atrophy of thyroid   (Stable)                  Plan:      We discussed insomnia and different treatment options. I explained to the patient that I agree with Dr. Merlinda Clamp that Ambien and benzodiazepines should be avoided. We also discussed the risks of long-term antihistamine use. We discussed setting goals for the treatment of his insomnia. Given that patient is sleeping less than 6 hours he does not appear to be fatigued or sleepy during the daytime nor is requiring naps most days. We structured the goal of treatment by looking at his functional status in the daytime and not so much the actual hours he is sleeping each night. There appears to be a strong component of anxiety given his rapid loss and vision in question if but daily medication for anxiety would be beneficial.  The patient is to continue his trazodone at 200 mg and then  the prescription for mirtazapine and start at 7.5 mg but can increase up to 15mg. We will see him back in a 3-month follow-up to see how he is doing on the medications and decide further work-up or changes in meds from there. Continue meds for: HTN, GERD, and hypothyroid. Pt would medically benefit from wt loss for BRADY (diet, exercise, surgical).        Subjective: Vitals:  Weight BMI   Wt Readings from Last 3 Encounters:   08/26/19 123 lb (55.8 kg)   04/30/19 127 lb (57.6 kg)   10/26/18 124 lb (56.2 kg)    Body mass index is 21.11 kg/m². BP HR SaO2   BP Readings from Last 3 Encounters:   08/26/19 132/85   04/30/19 120/64   10/26/18 136/80    Pulse Readings from Last 3 Encounters:   08/26/19 77   04/30/19 72   10/26/18 80    SpO2 Readings from Last 3 Encounters:   08/26/19 98%   04/30/19 97%   10/26/18 98%        Physical Exam   Constitutional: He is oriented to person, place, and time. Vital signs are normal. He appears well-developed and well-nourished. Non-toxic appearance. He does not have a sickly appearance. He is not intubated. HENT:   Head: Normocephalic and atraumatic. Not macrocephalic and not microcephalic. Right Ear: External ear normal.   Left Ear: External ear normal.   Nose: Mucosal edema and septal deviation present. Mouth/Throat: Uvula is midline and mucous membranes are normal. Posterior oropharyngeal edema present. No oropharyngeal exudate or tonsillar abscesses. Tonsils:   absent bilaterally  Post. Pharynx:   normal mucosa  Neck circumference: 15  Inches     Eyes: Right conjunctiva is injected. Left eye exhibits abnormal extraocular motion. Left pupil is not reactive (Prosthetic). Neck: Trachea normal and normal range of motion. Neck supple. No tracheal deviation present. No thyroid mass and no thyromegaly present. Cardiovascular: Normal rate, regular rhythm, S1 normal, S2 normal and normal heart sounds. Pulmonary/Chest: Effort normal. No accessory muscle usage. No apnea, no tachypnea and no bradypnea. He is not intubated. No respiratory distress. He has no decreased breath sounds. He has no wheezes. He has no rhonchi. He has no rales. He exhibits no mass, no tenderness and no deformity. Abdominal: Soft. Bowel sounds are normal. He exhibits no distension. There is no tenderness. Musculoskeletal: He exhibits no edema.    Gait -

## 2019-09-03 ENCOUNTER — TELEPHONE (OUTPATIENT)
Dept: INTERNAL MEDICINE CLINIC | Age: 84
End: 2019-09-03

## 2019-10-07 ENCOUNTER — TELEPHONE (OUTPATIENT)
Dept: INTERNAL MEDICINE CLINIC | Age: 84
End: 2019-10-07

## 2019-10-07 RX ORDER — TRAZODONE HYDROCHLORIDE 100 MG/1
100 TABLET ORAL NIGHTLY
Qty: 30 TABLET | Refills: 5 | Status: SHIPPED | OUTPATIENT
Start: 2019-10-07 | End: 2019-10-07 | Stop reason: SDUPTHER

## 2019-10-08 RX ORDER — MIRTAZAPINE 7.5 MG/1
7.5 TABLET, FILM COATED ORAL NIGHTLY
Qty: 90 TABLET | Refills: 1 | Status: SHIPPED | OUTPATIENT
Start: 2019-10-08 | End: 2019-11-19 | Stop reason: SDUPTHER

## 2019-10-08 RX ORDER — TRAZODONE HYDROCHLORIDE 100 MG/1
TABLET ORAL
Qty: 90 TABLET | Refills: 0 | Status: SHIPPED | OUTPATIENT
Start: 2019-10-08 | End: 2019-12-27 | Stop reason: SDUPTHER

## 2019-10-14 RX ORDER — BENAZEPRIL HYDROCHLORIDE 40 MG/1
TABLET, FILM COATED ORAL
Qty: 90 TABLET | Refills: 1 | Status: SHIPPED | OUTPATIENT
Start: 2019-10-14 | End: 2020-02-03 | Stop reason: SDUPTHER

## 2019-10-29 ENCOUNTER — OFFICE VISIT (OUTPATIENT)
Dept: INTERNAL MEDICINE CLINIC | Age: 84
End: 2019-10-29
Payer: MEDICARE

## 2019-10-29 ENCOUNTER — CARE COORDINATION (OUTPATIENT)
Dept: CARE COORDINATION | Age: 84
End: 2019-10-29

## 2019-10-29 VITALS
DIASTOLIC BLOOD PRESSURE: 84 MMHG | HEART RATE: 82 BPM | OXYGEN SATURATION: 94 % | WEIGHT: 124 LBS | BODY MASS INDEX: 20.66 KG/M2 | SYSTOLIC BLOOD PRESSURE: 132 MMHG | HEIGHT: 65 IN

## 2019-10-29 DIAGNOSIS — N18.30 HYPERTENSIVE KIDNEY DISEASE WITH STAGE 3 CHRONIC KIDNEY DISEASE (HCC): ICD-10-CM

## 2019-10-29 DIAGNOSIS — E03.4 HYPOTHYROIDISM DUE TO ACQUIRED ATROPHY OF THYROID: ICD-10-CM

## 2019-10-29 DIAGNOSIS — E78.00 PURE HYPERCHOLESTEROLEMIA: ICD-10-CM

## 2019-10-29 DIAGNOSIS — I10 ESSENTIAL HYPERTENSION: Primary | ICD-10-CM

## 2019-10-29 DIAGNOSIS — I12.9 HYPERTENSIVE KIDNEY DISEASE WITH STAGE 3 CHRONIC KIDNEY DISEASE (HCC): ICD-10-CM

## 2019-10-29 DIAGNOSIS — F51.01 PRIMARY INSOMNIA: ICD-10-CM

## 2019-10-29 DIAGNOSIS — H54.7 VISION IMPAIRMENT: ICD-10-CM

## 2019-10-29 PROBLEM — R63.4 WEIGHT LOSS: Status: RESOLVED | Noted: 2018-10-26 | Resolved: 2019-10-29

## 2019-10-29 PROCEDURE — G8427 DOCREV CUR MEDS BY ELIG CLIN: HCPCS | Performed by: INTERNAL MEDICINE

## 2019-10-29 PROCEDURE — G8420 CALC BMI NORM PARAMETERS: HCPCS | Performed by: INTERNAL MEDICINE

## 2019-10-29 PROCEDURE — 4040F PNEUMOC VAC/ADMIN/RCVD: CPT | Performed by: INTERNAL MEDICINE

## 2019-10-29 PROCEDURE — 1036F TOBACCO NON-USER: CPT | Performed by: INTERNAL MEDICINE

## 2019-10-29 PROCEDURE — 99214 OFFICE O/P EST MOD 30 MIN: CPT | Performed by: INTERNAL MEDICINE

## 2019-10-29 PROCEDURE — G8482 FLU IMMUNIZE ORDER/ADMIN: HCPCS | Performed by: INTERNAL MEDICINE

## 2019-10-29 PROCEDURE — 1123F ACP DISCUSS/DSCN MKR DOCD: CPT | Performed by: INTERNAL MEDICINE

## 2019-10-30 ENCOUNTER — TELEPHONE (OUTPATIENT)
Dept: INTERNAL MEDICINE CLINIC | Age: 84
End: 2019-10-30

## 2019-11-19 RX ORDER — MIRTAZAPINE 7.5 MG/1
7.5 TABLET, FILM COATED ORAL NIGHTLY
Qty: 90 TABLET | Refills: 1 | Status: SHIPPED | OUTPATIENT
Start: 2019-11-19 | End: 2020-03-10 | Stop reason: SDUPTHER

## 2019-12-27 RX ORDER — TRAZODONE HYDROCHLORIDE 100 MG/1
TABLET ORAL
Qty: 90 TABLET | Refills: 1 | Status: SHIPPED | OUTPATIENT
Start: 2019-12-27 | End: 2020-03-10 | Stop reason: SDUPTHER

## 2019-12-30 RX ORDER — LEVOTHYROXINE SODIUM 0.05 MG/1
TABLET ORAL
Qty: 90 TABLET | Refills: 1 | Status: SHIPPED | OUTPATIENT
Start: 2019-12-30 | End: 2020-05-13

## 2020-01-27 ENCOUNTER — TELEPHONE (OUTPATIENT)
Dept: INTERNAL MEDICINE CLINIC | Age: 85
End: 2020-01-27

## 2020-01-27 RX ORDER — ATORVASTATIN CALCIUM 20 MG/1
TABLET, FILM COATED ORAL
Qty: 90 TABLET | Refills: 1 | Status: SHIPPED | OUTPATIENT
Start: 2020-01-27 | End: 2020-05-15

## 2020-01-27 NOTE — TELEPHONE ENCOUNTER
Patient is requesting the following prescription(s):  atorvastatin (LIPITOR) 20 MG tablet TAKE 1 TABLET BY MOUTH EVERY DAY   #90    Anand [address] [place] [phone] - F [phone]    Patient made aware to allow 24 to 48 business hours for prescription refills. Patient can be reached @ phone # provided should there be any questions.   Aware doctor  is out of the office today

## 2020-02-03 ENCOUNTER — TELEPHONE (OUTPATIENT)
Dept: INTERNAL MEDICINE CLINIC | Age: 85
End: 2020-02-03

## 2020-02-03 RX ORDER — BENAZEPRIL HYDROCHLORIDE 40 MG/1
TABLET, FILM COATED ORAL
Qty: 90 TABLET | Refills: 1 | Status: SHIPPED | OUTPATIENT
Start: 2020-02-03 | End: 2020-07-01

## 2020-02-03 NOTE — TELEPHONE ENCOUNTER
Patient is requesting the following prescription(s):    benazepril (LOTENSIN) 40 MG tablet TAKE 1 TABLET BY MOUTH EVERY DAY   #90    [address] [place] [phone] - F [phone]     Patient made aware to allow 24 to 48 business hours for prescription refills. Patient can be reached @ phone # provided should there be any questions.

## 2020-03-10 ENCOUNTER — OFFICE VISIT (OUTPATIENT)
Dept: INTERNAL MEDICINE CLINIC | Age: 85
End: 2020-03-10
Payer: MEDICARE

## 2020-03-10 ENCOUNTER — CARE COORDINATION (OUTPATIENT)
Dept: CARE COORDINATION | Age: 85
End: 2020-03-10

## 2020-03-10 VITALS
BODY MASS INDEX: 21.68 KG/M2 | SYSTOLIC BLOOD PRESSURE: 136 MMHG | OXYGEN SATURATION: 95 % | DIASTOLIC BLOOD PRESSURE: 82 MMHG | HEIGHT: 64 IN | WEIGHT: 127 LBS | HEART RATE: 74 BPM

## 2020-03-10 DIAGNOSIS — E03.4 HYPOTHYROIDISM DUE TO ACQUIRED ATROPHY OF THYROID: ICD-10-CM

## 2020-03-10 DIAGNOSIS — I10 ESSENTIAL HYPERTENSION: ICD-10-CM

## 2020-03-10 PROBLEM — F32.1 CURRENT MODERATE EPISODE OF MAJOR DEPRESSIVE DISORDER WITHOUT PRIOR EPISODE (HCC): Status: ACTIVE | Noted: 2020-03-10

## 2020-03-10 LAB
A/G RATIO: 1.8 (ref 1.1–2.2)
ALBUMIN SERPL-MCNC: 4.2 G/DL (ref 3.4–5)
ALP BLD-CCNC: 103 U/L (ref 40–129)
ALT SERPL-CCNC: <5 U/L (ref 10–40)
ANION GAP SERPL CALCULATED.3IONS-SCNC: 15 MMOL/L (ref 3–16)
AST SERPL-CCNC: 7 U/L (ref 15–37)
BILIRUB SERPL-MCNC: 0.6 MG/DL (ref 0–1)
BUN BLDV-MCNC: 20 MG/DL (ref 7–20)
CALCIUM SERPL-MCNC: 8.7 MG/DL (ref 8.3–10.6)
CHLORIDE BLD-SCNC: 107 MMOL/L (ref 99–110)
CO2: 20 MMOL/L (ref 21–32)
CREAT SERPL-MCNC: 1.6 MG/DL (ref 0.8–1.3)
GFR AFRICAN AMERICAN: 50
GFR NON-AFRICAN AMERICAN: 41
GLOBULIN: 2.4 G/DL
GLUCOSE FASTING: 88 MG/DL (ref 70–99)
POTASSIUM SERPL-SCNC: 4.5 MMOL/L (ref 3.5–5.1)
SODIUM BLD-SCNC: 142 MMOL/L (ref 136–145)
TOTAL PROTEIN: 6.6 G/DL (ref 6.4–8.2)
TSH SERPL DL<=0.05 MIU/L-ACNC: 3.33 UIU/ML (ref 0.27–4.2)

## 2020-03-10 PROCEDURE — G8431 POS CLIN DEPRES SCRN F/U DOC: HCPCS | Performed by: INTERNAL MEDICINE

## 2020-03-10 PROCEDURE — 1123F ACP DISCUSS/DSCN MKR DOCD: CPT | Performed by: INTERNAL MEDICINE

## 2020-03-10 PROCEDURE — 4040F PNEUMOC VAC/ADMIN/RCVD: CPT | Performed by: INTERNAL MEDICINE

## 2020-03-10 PROCEDURE — G0439 PPPS, SUBSEQ VISIT: HCPCS | Performed by: INTERNAL MEDICINE

## 2020-03-10 PROCEDURE — G8427 DOCREV CUR MEDS BY ELIG CLIN: HCPCS | Performed by: INTERNAL MEDICINE

## 2020-03-10 PROCEDURE — 1036F TOBACCO NON-USER: CPT | Performed by: INTERNAL MEDICINE

## 2020-03-10 PROCEDURE — G8482 FLU IMMUNIZE ORDER/ADMIN: HCPCS | Performed by: INTERNAL MEDICINE

## 2020-03-10 PROCEDURE — G8420 CALC BMI NORM PARAMETERS: HCPCS | Performed by: INTERNAL MEDICINE

## 2020-03-10 PROCEDURE — 99497 ADVNCD CARE PLAN 30 MIN: CPT | Performed by: INTERNAL MEDICINE

## 2020-03-10 PROCEDURE — 99213 OFFICE O/P EST LOW 20 MIN: CPT | Performed by: INTERNAL MEDICINE

## 2020-03-10 RX ORDER — TRAZODONE HYDROCHLORIDE 100 MG/1
TABLET ORAL
Qty: 90 TABLET | Refills: 1 | Status: SHIPPED | OUTPATIENT
Start: 2020-03-10 | End: 2020-11-23

## 2020-03-10 RX ORDER — MIRTAZAPINE 7.5 MG/1
7.5 TABLET, FILM COATED ORAL NIGHTLY
Qty: 90 TABLET | Refills: 1 | Status: SHIPPED | OUTPATIENT
Start: 2020-03-10 | End: 2020-07-06 | Stop reason: SDUPTHER

## 2020-03-10 ASSESSMENT — PATIENT HEALTH QUESTIONNAIRE - PHQ9: SUM OF ALL RESPONSES TO PHQ QUESTIONS 1-9: 13

## 2020-03-10 ASSESSMENT — LIFESTYLE VARIABLES: HOW OFTEN DO YOU HAVE A DRINK CONTAINING ALCOHOL: 0

## 2020-03-10 NOTE — PROGRESS NOTES
Assessment/Plan     1. Routine general medical examination at a health care facility  See separate AWV note    2. ACP (advance care planning)  See separate AWV note  - [place] [facility] TO [address], [address] [[id number]]    3. Positive depression screening  See below  - Positive Screen for Clinical Depression with a Documented Follow-up Plan     4. Essential hypertension  Well-controlled. Continue current medications. - Comprehensive Metabolic Panel, Fasting; Future    5. Hypertensive kidney disease with stage 3 chronic kidney disease ([facility] [address]  Recheck renal function off NSAIDs, which he will continue to avoid. 6. Pure hypercholesterolemia  At goal of current dose of Lipitor- continue. 7. Hypothyroidism due to acquired atrophy of thyroid  Depression is likely multifactorial, but will adjust levothyroxine dose if indicated by lab results.   - TSH without Reflex; Future    8. Primary insomnia  Adequately controlled on current doses of Remeron and trazodone- continue. 9. Vision impairment  Eye specialist still trying to save his right eye, but he will likely become legally blind. Care Coordinator to explore supportive options, such as service dog. 10. Current moderate episode of major depressive disorder without prior episode ([facility] [address]  Discussed addition of Wellbutrin, which he declined, along with any other anti-depressant medications. He agrees to see Dr. Obie Eastman for counseling. Patient will call for suicidal ideation or any worsening symptoms of depression. Return in about 3 months (around 6/10/2020). Beatrice Plaza   YOB: 1935    Date of Visit:  3/10/2020    No Known Allergies   Prior to Visit Medications    Medication Sig Taking?  Authorizing Provider   benazepril (LOTENSIN) 40 MG tablet TAKE 1 TABLET BY MOUTH EVERY DAY Yes Sonja Sanabria MD   atorvastatin (LIPITOR) 20 MG tablet TAKE 1 TABLET BY MOUTH EVERY DAY Yes Sonja Sanabria MD   levothyroxine (SYNTHROID) 50 MCG tablet TAKE 1 TABLET BY MOUTH EVERY DAILY Yes Blanka Townsend MD   traZODone (DESYREL) 100 MG tablet TAKE 1 TABLET BY MOUTH EVERY NIGHT Yes Blanka Townsend MD   mirtazapine (REMERON) 7.5 MG tablet Take 1 tablet by mouth nightly Yes Blanka Townsend MD   Multiple Vitamins-Minerals (ICAPS AREDS 2 PO) Take by mouth Yes Historical Provider, MD   Lutein-Zeaxanthin (OCUVITE LUTEIN 25 PO) Take by mouth Yes Historical Provider, MD   docusate sodium (COLACE) 100 MG capsule Take 100 mg by mouth 2 times daily Yes Historical Provider, MD   vitamin B-12 (CYANOCOBALAMIN) 1000 MCG tablet Take 1,000 mcg by mouth daily Yes Historical Provider, MD   latanoprost (XALATAN) 0.005 % ophthalmic solution 1 drop nightly Yes Historical Provider, MD   Brimonidine Tartrate-Timolol (COMBIGAN OP) Apply to eye Yes Historical Provider, MD   Brinzolamide (AZOPT OP) Apply to eye Yes Historical Provider, MD       Vitals:    03/10/20 1018   BP: 136/82   Pulse: 74   SpO2: 95%   Weight: 127 lb (57.6 kg)   Height: 5' 3.5\" (1.613 m)      Body mass index is 22.14 kg/m². Wt Readings from Last 3 Encounters:   03/10/20 127 lb (57.6 kg)   10/29/19 124 lb (56.2 kg)   08/26/19 123 lb (55.8 kg)     BP Readings from Last 3 Encounters:   03/10/20 136/82   10/29/19 132/84   08/26/19 132/85       CC:  Patient presents for re-evaluation of the following medical concerns     HPI  Hypertension/CKD:  Home blood pressure monitoring: No.  He is adherent to a low sodium diet. Patient denies chest pain, shortness of breath, headache, lightheadedness and palpitations. Antihypertensive medication side effects: no medication side effects noted. Use of agents associated with hypertension: none. No NSAIDs. Hyperlipidemia:  No new myalgias or GI upset on higher dose of atorvastatin (Lipitor). Medication compliance: compliant all of the time. Patient is not following a low fat, low cholesterol diet. No regular exercise.     Hypothyroidism: Recent symptoms: depressed mood. He denies fatigue, weight gain, change in bowel habits, palpitations. Patient is taking his medication consistently on an empty stomach. Insomnia:  Current treatment: trazodone 100 mg qhs, Remeron 7.5 mg qhs, which has been effective. Medication side effects: none. Lab Results   Component Value Date    LDLCALC 90 10/29/2019    LDLCALC 101 (H) 2019    TRIGLYCFAST 120 10/29/2019    TRIGLYCFAST 127 2019    TRIG 177 (H) 2017    HDL 52 10/29/2019     Lab Results   Component Value Date    GLUF 88 10/29/2019    GLUCOSE 85 2017     Lab Results   Component Value Date     10/29/2019    K 5.2 (H) 10/29/2019    BUN 17 10/29/2019    CREATININE 1.5 (H) 10/29/2019    LABGLOM 45 (A) 10/29/2019    GFRAA 54 (A) 10/29/2019    CALCIUM 9.4 10/29/2019     Lab Results   Component Value Date    ALT 7 (L) 10/29/2019    AST 8 (L) 10/29/2019     Lab Results   Component Value Date    HGB 13.4 (L) 2017     Lab Results   Component Value Date    TSHREFLEX 1.98 2013    TSH 4.20 10/29/2019        Review of Systems  As documented inHPI    Social History     Tobacco Use    Smoking status: Former Smoker     Last attempt to quit: 1981     Years since quittin.0    Smokeless tobacco: Never Used   Substance Use Topics    Alcohol use: No     Comment: past history of alcoholism, quit 1981        Physical Exam  Constitutional:       General: He is not in acute distress. Appearance: He is well-developed. Eyes:      Conjunctiva/sclera: Conjunctivae normal.      Comments: Left eye artificial   Neck:      Musculoskeletal: Neck supple. Thyroid: No thyroid mass or thyromegaly. Vascular: No carotid bruit or JVD. Cardiovascular:      Rate and Rhythm: Normal rate and regular rhythm. Heart sounds: Normal heart sounds. No murmur. No friction rub. No gallop. Pulmonary:      Effort: Pulmonary effort is normal. No respiratory distress.       Breath sounds: Normal breath sounds. No wheezing, rhonchi or rales. Abdominal:      General: There is no distension. Palpations: Abdomen is soft. Tenderness: There is no abdominal tenderness. Lymphadenopathy:      Cervical: No cervical adenopathy. Skin:     General: Skin is warm and dry. Findings: No erythema or rash. Neurological:      Mental Status: He is alert and oriented to person, place, and time. Psychiatric:         Speech: Speech normal.         Behavior: Behavior normal.         Thought Content:  Thought content normal.         Judgment: Judgment normal.
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
## 2020-03-11 NOTE — TELEPHONE ENCOUNTER
Thanks for the information. I am happy to see them. Are you able to schedule an appointment with me when the wife returns your call?

## 2020-03-11 NOTE — CARE COORDINATION
Called and LM on     Judithberkley Garcia returned call with patient online, long discussion , scheduled w DR hare for this week    Future Appointments   Date Time Provider Akash Sheth   3/13/2020  1:30 PM Kaushal Serrano [facility]   6/9/2020  2:30 PM Marlene Garcia MD [facility]     Folder w information on guide dog programs, and DNR information  placed on DR Barber desk to provide to patient and wife .

## 2020-03-12 NOTE — PROGRESS NOTES
hygiene   Attitude: cooperative and friendly  Consciousness: alert  Orientation: oriented to person, place, time, general circumstance  Memory: recent and remote memory intact  Attention/Concentration: intact during session  Psychomotor Activity:normal  Eye Contact: normal  Speech: normal rate and volume, well-articulated  Mood: depressed  Affect: flat  Perception: within normal limits  Thought Content: within normal limits  Thought Process: logical, coherent and goal-directed  Insight: fair  Judgment: intact  Ability to understand instructions: Yes  Ability to respond meaningfully: Yes  Morbid Ideation: no   Suicide Assessment: no suicidal ideation, plan, or intent  Homicidal Ideation: no    A:  Not administered today to prevent spread of illness. Assessment/Progress in treatment/Treatment plan:  Patient appears to be experiencing symptoms consistent with Major Depressive Disorder, exacerbated by being bored, loss of vision, and not having a dog. Current coping skills include spending time with family, playing on computer, and playing with animals. Pt and wife discussed pt getting a dog and were compromising about this decision. Pt may benefit from brief intervention from writer for adaptive coping skill development for depression. Will refer to specialty mental health in the future if indicated. Diagnosis:    1.  Current moderate episode of major depressive disorder without prior episode [facility])       Patient Active Problem List   Diagnosis    Essential hypertension    Pure hypercholesterolemia    Hypertensive kidney disease with stage 3 chronic kidney disease (HCC)    Calculus of kidney    Migraine    GERD (gastroesophageal reflux disease)    Hypothyroidism due to acquired atrophy of thyroid    BPH (benign prostatic hyperplasia)    Bilateral renal cysts    Anemia    Constipation    Vision impairment    Primary insomnia    Macular degeneration    Cataract, right eye    Open-angle glaucoma   

## 2020-03-13 ENCOUNTER — OFFICE VISIT (OUTPATIENT)
Dept: PSYCHOLOGY | Age: 85
End: 2020-03-13
Payer: MEDICARE

## 2020-03-13 ENCOUNTER — CARE COORDINATION (OUTPATIENT)
Dept: CARE COORDINATION | Age: 85
End: 2020-03-13

## 2020-03-13 PROCEDURE — 90791 PSYCH DIAGNOSTIC EVALUATION: CPT | Performed by: PSYCHOLOGIST

## 2020-03-13 NOTE — PATIENT INSTRUCTIONS
Changing clothes 2-3 times a week  Clean up the bath every night  Consider waiting to get the dog until weather is better   Make sure to be willing to be responsible for the dog

## 2020-03-13 NOTE — CARE COORDINATION
Met with patient and his wife after appt today w [facility] [place]. They have agreed to get a dog but not a guide dog. Both expressed contentment w decision.     Will sign off as no other needsat this time   if concerns arise, have name and number to call

## 2020-03-27 ENCOUNTER — VIRTUAL VISIT (OUTPATIENT)
Dept: PSYCHOLOGY | Age: 85
End: 2020-03-27
Payer: MEDICARE

## 2020-03-27 PROCEDURE — 98968 PH1 ASSMT&MGMT NQHP 21-30: CPT | Performed by: PSYCHOLOGIST

## 2020-03-27 NOTE — PROGRESS NOTES
Denied   SI/HI: Reports that at times he thinks about how his family would react if he committed suicide and if it would solve any of his problems. However, he recognizes that suicide would only make this worse. Denies suicidal plan or intent. Denied HI.    Coping skills: playing with family dogs, spending time with family, playing on computer    History:    Health habits:   Sleep: sleeps good   Exercise: Denied   Medication adherence: Yes    Psychiatric history:   Current psychotropic medications:  Trazodone (100mg), mirtazapine (7.5mg)   Past mental health treatment: None    Social History:    Social supports: wife, AA friends, 3 children, grandchildren   Employment: retired 29 years ago from [employer]   Family psychiatric history: Unknown    Social History     Tobacco Use    Smoking status: Former Smoker     Last attempt to quit: 1981     Years since quittin.1    Smokeless tobacco: Never Used   Substance Use Topics    Alcohol use: No     Comment: past history of alcoholism, quit 1981      Social History     Substance and Sexual Activity   Drug Use No      Current Outpatient Medications   Medication Sig Dispense Refill    traZODone (DESYREL) 100 MG tablet TAKE 1 TABLET BY MOUTH EVERY NIGHT 90 tablet 1    mirtazapine (REMERON) 7.5 MG tablet Take 1 tablet by mouth nightly 90 tablet 1    benazepril (LOTENSIN) 40 MG tablet TAKE 1 TABLET BY MOUTH EVERY DAY 90 tablet 1    atorvastatin (LIPITOR) 20 MG tablet TAKE 1 TABLET BY MOUTH EVERY DAY 90 tablet 1    levothyroxine (SYNTHROID) 50 MCG tablet TAKE 1 TABLET BY MOUTH EVERY DAILY 90 tablet 1    Multiple Vitamins-Minerals (ICAPS AREDS 2 PO) Take by mouth      Lutein-Zeaxanthin (OCUVITE LUTEIN 25 PO) Take by mouth      docusate sodium (COLACE) 100 MG capsule Take 100 mg by mouth 2 times daily      vitamin B-12 (CYANOCOBALAMIN) 1000 MCG tablet Take 1,000 mcg by mouth daily      latanoprost (XALATAN) 0.005 % ophthalmic solution 1 drop nightly      Brimonidine Tartrate-Timolol (COMBIGAN OP) Apply to eye      Brinzolamide (AZOPT OP) Apply to eye       No current facility-administered medications for this visit. O:  MSE:    Appearance: N/A  Attitude: cooperative and friendly  Consciousness: alert  Orientation: oriented to person, place, time, general circumstance  Memory: recent and remote memory intact  Attention/Concentration: intact during session  Psychomotor Activity:N/A  Eye Contact: N/A  Speech: normal rate and volume, well-articulated  Mood: depressed  Affect: flat  Perception: within normal limits  Thought Content: within normal limits  Thought Process: logical, coherent and goal-directed  Insight: fair  Judgment: intact  Ability to understand instructions: Yes  Ability to respond meaningfully: Yes  Morbid Ideation: no   Suicide Assessment: no suicidal ideation, plan, or intent  Homicidal Ideation: no    A:  Not administered today due to telephone visit. Will administer verbally at next visit. Assessment/Progress in treatment/Treatment plan:  Patient appears to be experiencing symptoms consistent with Major Depressive Disorder, exacerbated by being bored, loss of vision, and not having a dog. Current coping skills include spending time with family, playing on computer, and playing with animals. Pt is engaged in behavioral health treatment for depression. Pt may benefit from additional brief intervention from writer for adaptive coping skill development for depression. Will refer to specialty mental health in the future if indicated. Diagnosis:    1.  Current moderate episode of major depressive disorder without prior episode [facility])       Patient Active Problem List   Diagnosis    Essential hypertension    Pure hypercholesterolemia    Hypertensive kidney disease with stage 3 chronic kidney disease (HCC)    Calculus of kidney    Migraine    GERD (gastroesophageal reflux disease)    Hypothyroidism due to acquired atrophy of thyroid    BPH (benign prostatic hyperplasia)    Bilateral renal cysts    Anemia    Constipation    Vision impairment    Primary insomnia    Macular degeneration    Cataract, right eye    Open-angle glaucoma    Vasomotor rhinitis    Pain in both feet    B12 deficiency    Cold intolerance    Numbness and tingling    Current moderate episode of major depressive disorder without prior episode ([address]        Plan:  Set the following goals: 1) express to family that you do not like when they tell you what to do because it makes you feel stupid and completely out of control of your life. Follow-up:   Return in about 2 weeks (around 4/10/2020). Pt interventions:  Established rapport, [place]-setting to identify pt's primary goals for [facility] visit / overall health, Supportive techniques, Praised pt for use of skills, Cognitive strategies to target maladaptive thoughts including \"If I do what people say, I am not a man\" \"My family thinks I am stupid and incapable\" and Trained in improving communication skills/assertive communication,   --------------------------------------------------------------------------------------------------------  The following was discussed with patient at initial behavioral health visit: pt provided informed consent for the behavioral health program. Discussed with patient model of service to include the limits of confidentiality (i.e. abuse reporting, suicide/homicide intervention, subpoena of court, documentation in medical record/coordination with primary care team, etc.) and short-term intervention focused approach. Pt indicated understanding. Visit took place via telephone. Pt preferred telephone visit over video. Confirmed patient's identity. At initial telehealth visit writer explained issues related to limits of confidentiality and delivery of telehealth services.  Weighed risks and benefits of use of telehealth services and determined patient was able to be properly assessed without an in-person visit given benefits outweighing risks related to COVID-19 and face-to-face visits. Reviewed risks of potential interruptions in services and means for alternative communication if needed. Discussed how writer will respond to inquiries from patient and how writer will handle emergencies that require emergency services. Confirmed patient's presence during today's visit and that patient was located in [place]. Pt gave verbal consent for telehealth services. Written consent could not be obtained due to recommendations for self-quarantine to prevent spread of illness. Documentation was done using voice recognition dragon software. Every effort was made to ensure accuracy; however, inadvertent, unintentional computerized transcription errors may be present.

## 2020-04-10 ENCOUNTER — VIRTUAL VISIT (OUTPATIENT)
Dept: PSYCHOLOGY | Age: 85
End: 2020-04-10
Payer: MEDICARE

## 2020-04-10 PROCEDURE — 98968 PH1 ASSMT&MGMT NQHP 21-30: CPT | Performed by: PSYCHOLOGIST

## 2020-04-10 ASSESSMENT — PATIENT HEALTH QUESTIONNAIRE - PHQ9
8. MOVING OR SPEAKING SO SLOWLY THAT OTHER PEOPLE COULD HAVE NOTICED. OR THE OPPOSITE, BEING SO FIGETY OR RESTLESS THAT YOU HAVE BEEN MOVING AROUND A LOT MORE THAN USUAL: 0
3. TROUBLE FALLING OR STAYING ASLEEP: 0
7. TROUBLE CONCENTRATING ON THINGS, SUCH AS READING THE NEWSPAPER OR WATCHING TELEVISION: 0
5. POOR APPETITE OR OVEREATING: 0
4. FEELING TIRED OR HAVING LITTLE ENERGY: 1
1. LITTLE INTEREST OR PLEASURE IN DOING THINGS: 1
9. THOUGHTS THAT YOU WOULD BE BETTER OFF DEAD, OR OF HURTING YOURSELF: 0
2. FEELING DOWN, DEPRESSED OR HOPELESS: 1
SUM OF ALL RESPONSES TO PHQ QUESTIONS 1-9: 6
SUM OF ALL RESPONSES TO PHQ9 QUESTIONS 1 & 2: 2
6. FEELING BAD ABOUT YOURSELF - OR THAT YOU ARE A FAILURE OR HAVE LET YOURSELF OR YOUR FAMILY DOWN: 3
SUM OF ALL RESPONSES TO PHQ QUESTIONS 1-9: 6

## 2020-04-10 NOTE — PROGRESS NOTES
drop nightly      Brimonidine Tartrate-Timolol (COMBIGAN OP) Apply to eye      Brinzolamide (AZOPT OP) Apply to eye       No current facility-administered medications for this visit. O:  MSE:    Appearance: N/A  Attitude: cooperative and friendly  Consciousness: alert  Orientation: oriented to person, place, time, general circumstance  Memory: recent and remote memory intact  Attention/Concentration: intact during session  Psychomotor Activity:N/A  Eye Contact: N/A  Speech: normal rate and volume, well-articulated  Mood: depressed  Affect: flat  Perception: within normal limits  Thought Content: within normal limits  Thought Process: logical, coherent and goal-directed  Insight: fair  Judgment: intact  Ability to understand instructions: Yes  Ability to respond meaningfully: Yes  Morbid Ideation: no   Suicide Assessment: no suicidal ideation, plan, or intent  Homicidal Ideation: no    A:  PHQ-9 Total Score: 6 (4/10/2020  2:33 PM)  Thoughts that you would be better off dead, or of hurting yourself in some way: 0 (4/10/2020  2:33 PM)      Assessment/Progress in treatment/Treatment plan:  Patient appears to be experiencing symptoms consistent with Major Depressive Disorder, exacerbated by being bored, loss of vision, and not having a dog. Current coping skills include spending time with family, playing on computer, and playing with animals. Pt is engaged in behavioral health treatment for depression and reports improvements in mood. Pt may benefit from additional brief intervention from writer for adaptive coping skill development for depression. Will refer to specialty mental health in the future if indicated. Diagnosis:    1.  Current moderate episode of major depressive disorder without prior episode [facility])       Patient Active Problem List   Diagnosis    Essential hypertension    Pure hypercholesterolemia    Hypertensive kidney disease with stage 3 chronic kidney disease (HCC)    Calculus of kidney

## 2020-05-13 RX ORDER — LEVOTHYROXINE SODIUM 0.05 MG/1
TABLET ORAL
Qty: 47 TABLET | Refills: 2 | Status: SHIPPED | OUTPATIENT
Start: 2020-05-13 | End: 2020-08-10

## 2020-05-15 RX ORDER — ATORVASTATIN CALCIUM 20 MG/1
TABLET, FILM COATED ORAL
Qty: 90 TABLET | Refills: 1 | Status: SHIPPED | OUTPATIENT
Start: 2020-05-15 | End: 2020-11-02

## 2020-05-15 NOTE — TELEPHONE ENCOUNTER
Last appointment: 3/10/2020  Next appointment: 6/9/2020  Last refill: 01/27/2020 # 90 with one refill

## 2020-06-09 ENCOUNTER — VIRTUAL VISIT (OUTPATIENT)
Dept: INTERNAL MEDICINE CLINIC | Age: 85
End: 2020-06-09
Payer: MEDICARE

## 2020-06-09 PROBLEM — F32.4 MAJOR DEPRESSIVE DISORDER WITH SINGLE EPISODE, IN PARTIAL REMISSION (HCC): Status: ACTIVE | Noted: 2020-03-10

## 2020-06-09 PROCEDURE — G8427 DOCREV CUR MEDS BY ELIG CLIN: HCPCS | Performed by: INTERNAL MEDICINE

## 2020-06-09 PROCEDURE — 1123F ACP DISCUSS/DSCN MKR DOCD: CPT | Performed by: INTERNAL MEDICINE

## 2020-06-09 PROCEDURE — 4040F PNEUMOC VAC/ADMIN/RCVD: CPT | Performed by: INTERNAL MEDICINE

## 2020-06-09 PROCEDURE — 99214 OFFICE O/P EST MOD 30 MIN: CPT | Performed by: INTERNAL MEDICINE

## 2020-06-12 ENCOUNTER — VIRTUAL VISIT (OUTPATIENT)
Dept: PSYCHOLOGY | Age: 85
End: 2020-06-12
Payer: MEDICARE

## 2020-06-12 PROCEDURE — 98968 PH1 ASSMT&MGMT NQHP 21-30: CPT | Performed by: PSYCHOLOGIST

## 2020-06-12 NOTE — PROGRESS NOTES
that this patient was able to be properly treated without an in-person session. Patient verified that they were currently located at the [facility] address that was provided during registration. Verified the following information:  Patient's identification: Yes  Patient location: [address]  [facility] [id number]  Patient's call back number: [phone]   Patient's emergency contact's name and number, as well as permission to contact them if needed: Extended Emergency Contact Information  Primary Emergency Contact: Aubrey Mclean [facility] [address]  Phone: [phone]  Relation: Spouse  Secondary Emergency Contact: Tad Rios  Address: [address] [facility], [address] [facility] Phone: [phone]  Mobile Phone: [phone]  Relation: Spouse     Provider location: [place] this is an episode with a patient who has not had a related appointment within my department in the past 7 days or scheduled within the next 24 hours. S:    Pt set the following goals at last visit: 1) consider ways to create things with words    Pt reports that he has been doing okay, but is bored. States that he cannot leave the house because of his wife's health. Pt is no longer going to get a dog. States that his wife does not think that they can care for a dog. Pt is now accepting of this because he is able to play with his daughter's and neighbors' dogs. States that he has been trying to have daily routine. Reports eye sight continues to deteriorate, but he feels better about what he can do.       Depression sx: anhedonia/diminished interest in activities, depressed mood, fatigue and feelings of worthlessness, symptoms have been present most recently for the past few months, buts states that he is feeling better   Anxiety sx: Denied   SI/HI: Reports that at times he thinks about how his family would react if he committed suicide and if it would solve any of his

## 2020-06-15 ENCOUNTER — TELEPHONE (OUTPATIENT)
Dept: INTERNAL MEDICINE CLINIC | Age: 85
End: 2020-06-15

## 2020-07-01 RX ORDER — BENAZEPRIL HYDROCHLORIDE 40 MG/1
TABLET, FILM COATED ORAL
Qty: 90 TABLET | Refills: 1 | Status: SHIPPED | OUTPATIENT
Start: 2020-07-01 | End: 2020-11-02

## 2020-07-06 ENCOUNTER — TELEPHONE (OUTPATIENT)
Dept: INTERNAL MEDICINE CLINIC | Age: 85
End: 2020-07-06

## 2020-07-06 RX ORDER — MIRTAZAPINE 7.5 MG/1
7.5 TABLET, FILM COATED ORAL NIGHTLY
Qty: 90 TABLET | Refills: 1 | Status: SHIPPED | OUTPATIENT
Start: 2020-07-06 | End: 2020-10-05

## 2020-07-06 NOTE — TELEPHONE ENCOUNTER
Patient is requesting a refill of mirtazapine (REMERON) 7.5 MG tablet to be sent to [facility] [address] - F [phone]

## 2020-08-10 RX ORDER — LEVOTHYROXINE SODIUM 0.05 MG/1
TABLET ORAL
Qty: 90 TABLET | Refills: 1 | Status: SHIPPED | OUTPATIENT
Start: 2020-08-10 | End: 2021-02-04

## 2020-08-28 ENCOUNTER — VIRTUAL VISIT (OUTPATIENT)
Dept: PSYCHOLOGY | Age: 85
End: 2020-08-28
Payer: MEDICARE

## 2020-08-28 PROCEDURE — 98968 PH1 ASSMT&MGMT NQHP 21-30: CPT | Performed by: PSYCHOLOGIST

## 2020-08-28 NOTE — PROGRESS NOTES
Behavioral Health Consultation  Maritza Rios Psy.D. Clinical Psychologist  8/28/2020     Start time 2:35pm  Stop time 2:55pm    Time spent with Patient: 20 minutes  This is patient's fourth [facility] appointment. Reason for Consult:  depression  Referring Provider: PCP    Feedback for PCP: No action needed. Writer will continue to follow pt for grief. At initial visit, pt provided informed consent for the behavioral health program. Discussed with patient model of service to include the limits of confidentiality (i.e. abuse reporting, suicide intervention, etc.) and short-term intervention focused approach. Pt indicated understanding    TELEHEALTH VISIT -- Audio only (During LTZOS-20 public health emergency)  }  Pursuant to the emergency declaration under the [facility], [facility] waiver authority and the [employer] Act, this phone call was conducted, with patient's consent, to reduce the patient's risk of exposure to COVID-19 and provide continuity of care for an established patient. Services were provided through a phone call discussion to substitute for in-person clinic visit. Pt gave verbal informed consent to participate in telehealth services. Consent:  He and/or health care decision maker is aware that that he may receive a bill for this service, depending on his insurance coverage, and has provided verbal consent to proceed: Yes    Conducted a risk-benefit analysis and determined that the patient's presenting problems are consistent with the use of telepsychology. Determined that the patient has sufficient knowledge and skills in the use of technology enabling them to adequately benefit from telepsychology. It was determined that this patient was able to be properly treated without an in-person session.  Patient verified that they were currently located at the [address] [place] [address] address that was provided during Trazodone (100mg), mirtazapine (7.5mg)   Past mental health treatment: None    Social History:    Social supports: wife, AA friends, 3 children, grandchildren   Employment: retired 29 years ago from [employer]   Family psychiatric history: Unknown    Social History     Tobacco Use    Smoking status: Former Smoker     Last attempt to quit: 1981     Years since quittin.5    Smokeless tobacco: Never Used   Substance Use Topics    Alcohol use: No     Comment: past history of alcoholism, quit 1981      Social History     Substance and Sexual Activity   Drug Use No      Current Outpatient Medications   Medication Sig Dispense Refill    levothyroxine (SYNTHROID) 50 MCG tablet TAKE ONE TABLET BY MOUTH DAILY 90 tablet 1    mirtazapine (REMERON) 7.5 MG tablet Take 1 tablet by mouth nightly 90 tablet 1    benazepril (LOTENSIN) 40 MG tablet TAKE 1 TABLET BY MOUTH EVERY DAY 90 tablet 1    atorvastatin (LIPITOR) 20 MG tablet TAKE ONE TABLET BY MOUTH DAILY 90 tablet 1    traZODone (DESYREL) 100 MG tablet TAKE 1 TABLET BY MOUTH EVERY NIGHT 90 tablet 1    Multiple Vitamins-Minerals (ICAPS AREDS 2 PO) Take by mouth      Lutein-Zeaxanthin (OCUVITE LUTEIN 25 PO) Take by mouth      docusate sodium (COLACE) 100 MG capsule Take 100 mg by mouth 2 times daily      vitamin B-12 (CYANOCOBALAMIN) 1000 MCG tablet Take 1,000 mcg by mouth daily      latanoprost (XALATAN) 0.005 % ophthalmic solution 1 drop nightly      Brimonidine Tartrate-Timolol (COMBIGAN OP) Apply to eye      Brinzolamide (AZOPT OP) Apply to eye       No current facility-administered medications for this visit.        O:  MSE:    Appearance: N/A  Attitude: cooperative and friendly  Consciousness: alert  Orientation: oriented to person, place, time, general circumstance  Memory: recent and remote memory intact  Attention/Concentration: intact during session  Psychomotor Activity:N/A  Eye Contact: N/A  Speech: normal rate and volume, well-articulated  Mood: okay  Affect: euthymic   Perception: within normal limits  Thought Content: within normal limits  Thought Process: logical, coherent and goal-directed  Insight: fair  Judgment: intact  Ability to understand instructions: Yes  Ability to respond meaningfully: Yes  Morbid Ideation: no   Suicide Assessment: no suicidal ideation, plan, or intent  Homicidal Ideation: no    A:  No data recorded    Assessment/Progress in treatment/Treatment plan:  Patient initially presented for treatment for depression and had successfully completed an episode of care. Recently his wife passed away unexpectedly. Pt is engaging in treatment for stress and grief. Diagnosis:    1. Current moderate episode of major depressive disorder without prior episode ([facility] [address]    2. Grief       Patient Active Problem List   Diagnosis    Essential hypertension    Pure hypercholesterolemia    Hypertensive kidney disease with stage 3 chronic kidney disease (HCC)    Calculus of kidney    Migraine    GERD (gastroesophageal reflux disease)    Hypothyroidism due to acquired atrophy of thyroid    BPH (benign prostatic hyperplasia)    Bilateral renal cysts    Anemia    Constipation    Vision impairment    Primary insomnia    Macular degeneration    Cataract, right eye    Open-angle glaucoma    Vasomotor rhinitis    Pain in both feet    B12 deficiency    Cold intolerance    Numbness and tingling    Major depressive disorder with single episode, in partial remission ([facility] [address]        Plan:  Set the following goals: 1) allow people to help you 2) increase self-care 3) deep breathing    Follow-up:   Return in about 4 weeks (around 9/25/2020).      Pt interventions:  Established rapport, [place]-setting to identify pt's primary goals for [facility] visit / overall health, Supportive techniques, Emphasized self-care as important for managing overall health and Praised pt for use of skills

## 2020-09-09 PROBLEM — F43.21 GRIEF REACTION: Status: ACTIVE | Noted: 2020-09-09

## 2020-09-09 NOTE — PROGRESS NOTES
pain, shortness of breath, headache, lightheadedness and palpitations.  Antihypertensive medication side effects: no medication side effects noted.  Use of agents associated with hypertension: none.  No NSAIDs.     Hyperlipidemia:  No new myalgias or GI upset on higher dose of atorvastatin (Lipitor).  Medication compliance: compliant all of the time. Patient is not following a low fat, low cholesterol diet.  No regular exercise.     Hypothyroidism: Recent symptoms: intermittent constipation.  He denies fatigue, weight gain, change in bowel habits, palpitations. Patient is taking his medication consistently on an empty stomach.     Insomnia/Depression/Grief:  Current treatment: trazodone 100 mg qhs, Remeron 7.5 mg qhs, which has been effective.  Medication side effects: none. Current symptoms include anhedonia, depressed mood and anxiety related to recent loss of wife and visual loss. Patient denies difficulty concentrating, feelings of worthlessness/guilt, hopelessness, impaired memory, change in appetite or suicidal ideation. Weight has been stable. Last [facility] visit 8/28/20. Details of this discussion including any medical advice provided: There are no diagnoses linked to this encounter. No follow-ups on file. I affirm this is a Patient Initiated Episode with a Patient who has not had a related appointment within my department in the past 7 days or scheduled within the next 24 hours. Patient identification was verified at the start of the visit: Yes    Total Time: {minutes:03139::\"5-10 minutes\"}    Note: not billable if this call serves to triage the patient into an appointment for the relevant concern    An  electronic signature was used to authenticate this note.     --Suni Ramos MD on 9/9/2020 at 7:20 PM

## 2020-09-10 ENCOUNTER — OFFICE VISIT (OUTPATIENT)
Dept: INTERNAL MEDICINE CLINIC | Age: 85
End: 2020-09-10
Payer: MEDICARE

## 2020-09-10 VITALS
TEMPERATURE: 97.5 F | HEART RATE: 94 BPM | OXYGEN SATURATION: 97 % | DIASTOLIC BLOOD PRESSURE: 80 MMHG | BODY MASS INDEX: 21.62 KG/M2 | WEIGHT: 124 LBS | RESPIRATION RATE: 14 BRPM | SYSTOLIC BLOOD PRESSURE: 136 MMHG

## 2020-09-10 PROCEDURE — G0008 ADMIN INFLUENZA VIRUS VAC: HCPCS | Performed by: INTERNAL MEDICINE

## 2020-09-10 PROCEDURE — 4040F PNEUMOC VAC/ADMIN/RCVD: CPT | Performed by: INTERNAL MEDICINE

## 2020-09-10 PROCEDURE — 99214 OFFICE O/P EST MOD 30 MIN: CPT | Performed by: INTERNAL MEDICINE

## 2020-09-10 PROCEDURE — 90653 IIV ADJUVANT VACCINE IM: CPT | Performed by: INTERNAL MEDICINE

## 2020-09-10 PROCEDURE — 1036F TOBACCO NON-USER: CPT | Performed by: INTERNAL MEDICINE

## 2020-09-10 PROCEDURE — G8427 DOCREV CUR MEDS BY ELIG CLIN: HCPCS | Performed by: INTERNAL MEDICINE

## 2020-09-10 PROCEDURE — 1123F ACP DISCUSS/DSCN MKR DOCD: CPT | Performed by: INTERNAL MEDICINE

## 2020-09-10 PROCEDURE — G8420 CALC BMI NORM PARAMETERS: HCPCS | Performed by: INTERNAL MEDICINE

## 2020-09-10 NOTE — PATIENT INSTRUCTIONS
Have labs drawn at one of sites below at your convenience. You should fast overnight. Labs currently open:  [address]  [place], [address]   M-F 7:30am - 4pm  [address] [facility], [address] [facility] [address]                                       M-F 7a-6p; Sa 8a-12p        [address], [address]   [facility], [address] [facility]  M-F 7:30am - 5pm    [facility] and [address] [facility]  M-F 8a-4:30p  [address]  [address] [facility], [address]  [place], Fr 7:30 a-5 p  Wed 7:30 a-12 p  [phone]    [facility]   [address] [facility], [facility] 24                         M-F 7a-5p                              [address] [facility]  [address] [facility], [address] [address],[address]   [address]  M-F Miguelina Thompson 13, ISU 8am-NoReplaced by [phone]    [facility]                                                        [address] [facility], [facility]                                                      M-F 8a-5:30p                        [phone]

## 2020-09-10 NOTE — PROGRESS NOTES
Assessment/Plan:     1. Essential hypertension  Adequately controlled on current regimen- continue. 2. Hypertensive kidney disease with stage 3 chronic kidney disease (HCC)  Stable. He will continue to avoid NSAIDs and stay well-hydrated. 3. Pure hypercholesterolemia  Tolerating current dose(s) of Lipitor- continue. 4. Hypothyroidism due to acquired atrophy of thyroid  Constipation is likely multifactorial, but will adjust levothyroxine dose if indicated by lab results. 5. Primary insomnia  Well-controlled on current doses of trazodone and Remeron- continue. 6. Major depressive disorder with single episode, in partial remission [facility])  Doing as well as can be expected given recent loss of his wife. Continue regular visits with Dr. Kelvin Gonzalez. He feels that he has excellent family support and is benefiting from daily contact with granddaughter's dogs. 7. Grief reaction  See above. Return in about 6 months (around 3/10/2021) for MEDICARE AWVTristan Best Escobedo   YOB: 1935    Date of Visit:  9/10/2020    CC:  Patient presents for re-evaluation of the following medical concerns     HPI:  Hypertension/CKD:  Home blood pressure monitoring: No.  He is adherent to a low sodium diet. Patient denies chest pain, shortness of breath, headache, lightheadedness and palpitations.  Antihypertensive medication side effects: no medication side effects noted.  Use of agents associated with hypertension: none.  No NSAIDs.     Hyperlipidemia:  No new myalgias or GI upset on higher dose of atorvastatin (Lipitor).  Medication compliance: compliant all of the time. Patient is not following a low fat, low cholesterol diet.  No regular exercise.     Hypothyroidism: Recent symptoms: intermittent constipation- improved.  He denies fatigue, weight gain, change in bowel habits, palpitations.  Patient is taking his medication consistently on an empty stomach.     Insomnia/Depression/Grief:  Current Weight:  124 lb (56.2 kg)      Body mass index is 21.62 kg/m². Wt Readings from Last 3 Encounters:   03/10/20 127 lb (57.6 kg)   10/29/19 124 lb (56.2 kg)   08/26/19 123 lb (55.8 kg)     BP Readings from Last 3 Encounters:   09/10/20 136/80   03/10/20 136/82   10/29/19 132/84       Physical Exam  Constitutional:       General: He is not in acute distress. Appearance: He is well-developed. Eyes:      Conjunctiva/sclera: Conjunctivae normal.      Comments: Left eye artificial   Neck:      Musculoskeletal: Neck supple. Thyroid: No thyroid mass or thyromegaly. Vascular: No carotid bruit or JVD. Cardiovascular:      Rate and Rhythm: Normal rate and regular rhythm. Heart sounds: Normal heart sounds. No murmur. No friction rub. No gallop. Pulmonary:      Effort: Pulmonary effort is normal. No respiratory distress. Breath sounds: Normal breath sounds. No wheezing, rhonchi or rales. Abdominal:      General: There is no distension. Palpations: Abdomen is soft. Tenderness: There is no abdominal tenderness. Lymphadenopathy:      Cervical: No cervical adenopathy. Skin:     General: Skin is warm and dry. Findings: No erythema or rash. Neurological:      Mental Status: He is alert and oriented to person, place, and time. Psychiatric:         Speech: Speech normal.         Behavior: Behavior normal.         Thought Content:  Thought content normal.         Judgment: Judgment normal.       Lab Results   Component Value Date    CHOLFAST 166 10/29/2019    TRIGLYCFAST 120 10/29/2019    HDL 52 10/29/2019    LDLCALC 90 10/29/2019     Lab Results   Component Value Date    GLUF 88 03/10/2020     Lab Results   Component Value Date     03/10/2020    K 4.5 03/10/2020    BUN 20 03/10/2020    CREATININE 1.6 (H) 03/10/2020    LABGLOM 41 (A) 03/10/2020    GFRAA 50 (A) 03/10/2020    CALCIUM 8.7 03/10/2020     Lab Results   Component Value Date    ALT <5 (L) 03/10/2020    AST 7 (L) 03/10/2020     Lab Results   Component Value Date    HGB 13.4 (L) 04/24/2017     Lab Results   Component Value Date    TSHREFLEX 1.98 08/20/2013    TSH 3.33 03/10/2020

## 2020-10-05 ENCOUNTER — VIRTUAL VISIT (OUTPATIENT)
Dept: PSYCHOLOGY | Age: 85
End: 2020-10-05
Payer: MEDICARE

## 2020-10-05 ENCOUNTER — TELEPHONE (OUTPATIENT)
Dept: INTERNAL MEDICINE CLINIC | Age: 85
End: 2020-10-05

## 2020-10-05 PROCEDURE — 98968 PH1 ASSMT&MGMT NQHP 21-30: CPT | Performed by: PSYCHOLOGIST

## 2020-10-05 RX ORDER — MIRTAZAPINE 7.5 MG/1
TABLET, FILM COATED ORAL
Qty: 90 TABLET | Refills: 1 | Status: SHIPPED | OUTPATIENT
Start: 2020-10-05 | End: 2021-03-15

## 2020-10-05 NOTE — PROGRESS NOTES
Behavioral Health Consultation  Adrian Goss Psy.D. Clinical Psychologist  10/5/2020     Start time 2:01pm  Stop time 2:27pm    Time spent with Patient: 26 minutes  This is patient's [facility] appointment. Reason for Consult:  depression  Referring Provider: PCP    Feedback for PCP: No action needed. Writer will continue to follow pt for grief. At initial visit, pt provided informed consent for the behavioral health program. Discussed with patient model of service to include the limits of confidentiality (i.e. abuse reporting, suicide intervention, etc.) and short-term intervention focused approach. Pt indicated understanding    TELEHEALTH VISIT -- Audio only (During AQFWQ-30 public health emergency)  }  Pursuant to the emergency declaration under the [facility], [facility] waiver authority and the [employer] Act, this phone call was conducted, with patient's consent, to reduce the patient's risk of exposure to COVID-19 and provide continuity of care for an established patient. Services were provided through a phone call discussion to substitute for in-person clinic visit. Pt gave verbal informed consent to participate in telehealth services. Consent:  He and/or health care decision maker is aware that that he may receive a bill for this service, depending on his insurance coverage, and has provided verbal consent to proceed: Yes    Conducted a risk-benefit analysis and determined that the patient's presenting problems are consistent with the use of telepsychology. Determined that the patient has sufficient knowledge and skills in the use of technology enabling them to adequately benefit from telepsychology. It was determined that this patient was able to be properly treated without an in-person session.  Patient verified that they were currently located at the [facility] address that was provided during managing overall health, Engaged in values clarification, Set plan for engaging in more value-guided action, Praised pt for use of skills and Trained in improving communication skills/assertive communication

## 2020-11-02 RX ORDER — BENAZEPRIL HYDROCHLORIDE 40 MG/1
TABLET, FILM COATED ORAL
Qty: 90 TABLET | Refills: 1 | Status: SHIPPED | OUTPATIENT
Start: 2020-11-02 | End: 2021-04-11

## 2020-11-02 RX ORDER — ATORVASTATIN CALCIUM 20 MG/1
TABLET, FILM COATED ORAL
Qty: 52 TABLET | Refills: 1 | Status: SHIPPED | OUTPATIENT
Start: 2020-11-02 | End: 2021-02-04

## 2020-11-02 NOTE — TELEPHONE ENCOUNTER
Last appointment: 9/10/2020  Next appointment: Visit date not found  Last refill: 7/1/20 and 5/15/20

## 2020-11-03 RX ORDER — ATORVASTATIN CALCIUM 20 MG/1
TABLET, FILM COATED ORAL
Qty: 52 TABLET | Refills: 0 | OUTPATIENT
Start: 2020-11-03

## 2020-11-03 RX ORDER — BENAZEPRIL HYDROCHLORIDE 40 MG/1
TABLET, FILM COATED ORAL
Qty: 90 TABLET | Refills: 0 | OUTPATIENT
Start: 2020-11-03

## 2020-11-13 ENCOUNTER — TELEPHONE (OUTPATIENT)
Dept: INTERNAL MEDICINE CLINIC | Age: 85
End: 2020-11-13

## 2020-11-13 NOTE — TELEPHONE ENCOUNTER
Called pharmacy to clarify what was needed. They stated they need a script called in. Advised script was called in 11/2/20 they will get that filled for patient.  Patient has been advised

## 2020-11-13 NOTE — TELEPHONE ENCOUNTER
Joe told patient they flavio authorization on Atorvastatin re-fill. Please call patient with status update.

## 2020-11-23 RX ORDER — TRAZODONE HYDROCHLORIDE 100 MG/1
TABLET ORAL
Qty: 90 TABLET | Refills: 1 | Status: SHIPPED | OUTPATIENT
Start: 2020-11-23 | End: 2021-05-20

## 2020-11-23 NOTE — TELEPHONE ENCOUNTER
Last appointment: 9/10/2020  Next appointment: Visit date not found.  Due in March  Last refill: 3/10/2020  #90 X1

## 2021-02-12 ENCOUNTER — VIRTUAL VISIT (OUTPATIENT)
Dept: PSYCHOLOGY | Age: 86
End: 2021-02-12
Payer: MEDICARE

## 2021-02-12 DIAGNOSIS — F32.1 CURRENT MODERATE EPISODE OF MAJOR DEPRESSIVE DISORDER WITHOUT PRIOR EPISODE (HCC): Primary | ICD-10-CM

## 2021-02-12 DIAGNOSIS — F43.21 GRIEF: ICD-10-CM

## 2021-02-12 PROCEDURE — 98968 PH1 ASSMT&MGMT NQHP 21-30: CPT | Performed by: PSYCHOLOGIST

## 2021-02-12 NOTE — PROGRESS NOTES
Behavioral Health Consultation  Emily Boyer Psy.D. Clinical Psychologist  2/12/2021     Start time 1:05pm  Stop time 1:28pm    Time spent with Patient: 23 minutes  This is patient's sixth [facility] appointment. Reason for Consult:  depression  Referring Provider: PCP    Feedback for PCP: No action needed. Writer will continue to follow pt for grief. At initial visit, pt provided informed consent for the behavioral health program. Discussed with patient model of service to include the limits of confidentiality (i.e. abuse reporting, suicide intervention, etc.) and short-term intervention focused approach. Pt indicated understanding    TELEHEALTH VISIT -- Audio only (During JKXSW-32 public health emergency)  }  Pursuant to the emergency declaration under the [facility], [facility] waiver authority and the [employer] Act, this phone call was conducted, with patient's consent, to reduce the patient's risk of exposure to COVID-19 and provide continuity of care for an established patient. Services were provided through a phone call discussion to substitute for in-person clinic visit. Pt gave verbal informed consent to participate in telehealth services. Consent:  He and/or health care decision maker is aware that that he may receive a bill for this service, depending on his insurance coverage, and has provided verbal consent to proceed: Yes    Conducted a risk-benefit analysis and determined that the patient's presenting problems are consistent with the use of telepsychology. Determined that the patient has sufficient knowledge and skills in the use of technology enabling them to adequately benefit from telepsychology. It was determined that this patient was able to be properly treated without an in-person session. Patient verified that they were currently located at the [facility] address that was provided during registration. Verified the following information:  Patient's identification: Yes  Patient location: [place] [address]  Patient's call back number: [phone]   Patient's emergency contact's name and number, as well as permission to contact them if needed: Extended Emergency Contact Information  Primary Emergency Contact: Kuldip Oquendo of [address] Phone: [phone]  Relation: Spouse  Secondary Emergency Contact: Raegan Biggs  Address: [address], [place] [address] See Marlow of [address] Phone: [phone]  Mobile Phone: [phone]  Relation: Spouse     Provider location: [place] Casiano affirm this is an episode with a patient who has not had a related appointment within my department in the past 7 days or scheduled within the next 24 hours. S:    Patient reports that he has been feeling very stressed and overwhelmed lately. His vision is worsening and he is having conflict at home. Explains that his granddaughter took out a home equity loan and his name for $90,000 to renovate the home. Reports that she and her fiancé are going to be paying for the monthly payment on the home equity loan. Patient explained that he does not like the renovations that they are doing to the home. Reports that his granddaughter is staying in the home to help patient, so he has allowed her to make changes to the home. Patient expresses frustration with her fiancé. Explains that he wants to make it work with them living there, but is not sure how to do so.  Depression sx: anhedonia/diminished interest in activities, depressed mood, fatigue and feelings of worthlessness, symptoms were present most recently a few months ago, but have been worse again since the death of his wife   Anxiety sx: Denied   SI/HI:  Denies current suicidal plan or intent. Denied HI.   Patient reports that a few weeks ago he had fleeting thoughts of suicide because he wanted to show his OP) Apply to eye       No current facility-administered medications for this visit. O:  MSE:    Appearance: N/A  Attitude: cooperative and friendly  Consciousness: alert  Orientation: oriented to person, place, time, general circumstance  Memory: recent and remote memory intact  Attention/Concentration: intact during session  Psychomotor Activity:N/A  Eye Contact: N/A  Speech: normal rate and volume, well-articulated  Mood: depressed  Affect: flat  Perception: within normal limits  Thought Content: within normal limits  Thought Process: logical, coherent and goal-directed  Insight: fair  Judgment: intact  Ability to understand instructions: Yes  Ability to respond meaningfully: Yes  Morbid Ideation: no   Suicide Assessment: no suicidal ideation, plan, or intent  Homicidal Ideation: no    A:  Not completed due to COVID limitations. Assessment/Progress in treatment/Treatment plan:  Patient initially presented for treatment for depression and had successfully completed an episode of care. Recently his wife passed away unexpectedly. Pt is engaging in treatment for stress and grief. Diagnosis:    1. Current moderate episode of major depressive disorder without prior episode ([facility] [address]    2.  Grief       Patient Active Problem List   Diagnosis    Essential hypertension    Pure hypercholesterolemia    Hypertensive kidney disease with stage 3 chronic kidney disease    Calculus of kidney    Migraine    GERD (gastroesophageal reflux disease)    Hypothyroidism due to acquired atrophy of thyroid    BPH (benign prostatic hyperplasia)    Bilateral renal cysts    Anemia    Constipation    Vision impairment    Primary insomnia    Macular degeneration    Cataract, right eye    Open-angle glaucoma    Vasomotor rhinitis    Pain in both feet    B12 deficiency    Cold intolerance    Numbness and tingling    Major depressive disorder with single episode, in partial remission (HCC)    Grief reaction Plan: 1) talk to daughter and granddaughter about having a family meeting with [facility] for problem-solving    Follow-up:   Return in about 6 weeks (around 3/26/2021).      Pt interventions:  Established rapport, [place]-setting to identify pt's primary goals for [facility] visit / overall health, Supportive techniques, Emphasized self-care as important for managing overall health, Engaged in values clarification, Set plan for engaging in more value-guided action and Trained in improving communication skills/assertive communication

## 2021-03-05 RX ORDER — ATORVASTATIN CALCIUM 20 MG/1
TABLET, FILM COATED ORAL
Qty: 90 TABLET | Refills: 0 | Status: SHIPPED | OUTPATIENT
Start: 2021-03-05 | End: 2021-11-05 | Stop reason: SDUPTHER

## 2021-03-15 RX ORDER — MIRTAZAPINE 7.5 MG/1
TABLET, FILM COATED ORAL
Qty: 90 TABLET | Refills: 1 | Status: SHIPPED | OUTPATIENT
Start: 2021-03-15 | End: 2021-03-21 | Stop reason: SDUPTHER

## 2021-03-19 ENCOUNTER — TELEPHONE (OUTPATIENT)
Dept: INTERNAL MEDICINE CLINIC | Age: 86
End: 2021-03-19

## 2021-03-19 ENCOUNTER — OFFICE VISIT (OUTPATIENT)
Dept: PSYCHOLOGY | Age: 86
End: 2021-03-19
Payer: MEDICARE

## 2021-03-19 DIAGNOSIS — F32.1 CURRENT MODERATE EPISODE OF MAJOR DEPRESSIVE DISORDER WITHOUT PRIOR EPISODE (HCC): Primary | ICD-10-CM

## 2021-03-19 PROCEDURE — 90834 PSYTX W PT 45 MINUTES: CPT | Performed by: PSYCHOLOGIST

## 2021-03-19 PROCEDURE — 1036F TOBACCO NON-USER: CPT | Performed by: PSYCHOLOGIST

## 2021-03-19 NOTE — PROGRESS NOTES
Behavioral Health Consultation  Tiana Santana Psy.D. Clinical Psychologist  3/19/2021     Start time 2:30pm  Stop time 3:15pm    Time spent with Patient: 45 minutes  This is patient's seventh [facility] appointment. Reason for Consult:  depression  Referring Provider: PCP    Feedback for PCP: Patient needs refill. on mirtazapine     At initial visit, pt provided informed consent for the behavioral health program. Discussed with patient model of service to include the limits of confidentiality (i.e. abuse reporting, suicide intervention, etc.) and short-term intervention focused approach. Pt indicated understanding    S:    Patient reports that he is frustrated with his living situation. He brought his daughter and granddaughter to the visit to discuss issues. States that he feels that his granddaughter should respect him more and his granddaughter feels that he is ungrateful. Patient reports that the home renovations that they are doing are stressful to him. Daughter reports that she hears them yell at each other and does not want them living together to ruin the good relationship that they had. Family appears highly involved in patient's life.  Depression sx: anhedonia/diminished interest in activities, depressed mood, fatigue and feelings of worthlessness, symptoms were present most recently a few months ago, but have been worse again since the death of his wife   Anxiety sx: Denied   SI/HI:  Denies current suicidal plan or intent. Denied HI. Patient reports that over a month ago he had fleeting thoughts of suicide because he wanted to show his daughter and granddaughter that he is suffering. However, quickly realized that this was not a good solution and that he does not want to act on these thoughts.    Coping skills: playing with family dogs, spending time with family, playing on computer    History:    Health habits:   Sleep: sleeps good   Exercise: Denied   Medication adherence: Yes    Psychiatric history:   Current psychotropic medications:  Trazodone (100mg), mirtazapine (7.5mg)   Past mental health treatment: None    Social History:    Social supports: wife, AA friends, 3 children, grandchildren   Employment: retired 29 years ago from [employer]formerly [employer]   Family psychiatric history: Unknown    Social History     Tobacco Use    Smoking status: Former Smoker     Quit date: 1981     Years since quittin.1    Smokeless tobacco: Never Used   Substance Use Topics    Alcohol use: No     Comment: past history of alcoholism, quit 1981      Social History     Substance and Sexual Activity   Drug Use No      Current Outpatient Medications   Medication Sig Dispense Refill    mirtazapine (REMERON) 7.5 MG tablet TAKE ONE TABLET BY MOUTH ONCE NIGHTLY 90 tablet 1    atorvastatin (LIPITOR) 20 MG tablet TAKE ONE TABLET BY MOUTH DAILY 90 tablet 0    levothyroxine (SYNTHROID) 50 MCG tablet TAKE ONE TABLET BY MOUTH DAILY 90 tablet 1    traZODone (DESYREL) 100 MG tablet TAKE ONE TABLET BY MOUTH EVERY NIGHT AT BEDTIME 90 tablet 1    benazepril (LOTENSIN) 40 MG tablet TAKE ONE TABLET BY MOUTH DAILY 90 tablet 1    Multiple Vitamins-Minerals (ICAPS AREDS 2 PO) Take by mouth      Lutein-Zeaxanthin (OCUVITE LUTEIN 25 PO) Take by mouth      docusate sodium (COLACE) 100 MG capsule Take 100 mg by mouth 2 times daily      vitamin B-12 (CYANOCOBALAMIN) 1000 MCG tablet Take 1,000 mcg by mouth daily      latanoprost (XALATAN) 0.005 % ophthalmic solution 1 drop nightly      Brimonidine Tartrate-Timolol (COMBIGAN OP) Apply to eye      Brinzolamide (AZOPT OP) Apply to eye       No current facility-administered medications for this visit.        O:  MSE:    Appearance: good hygiene  and appropriate attireA  Attitude: cooperative and friendly  Consciousness: alert  Orientation: oriented to person, place, time, general circumstance  Memory: recent and remote memory intact  Attention/Concentration: intact during session  Psychomotor Activity:normal  Eye Contact: normal  Speech: normal rate and volume, well-articulated  Mood: depressed  Affect: flat  Perception: within normal limits  Thought Content: within normal limits  Thought Process: logical, coherent and goal-directed  Insight: fair  Judgment: intact  Ability to understand instructions: Yes  Ability to respond meaningfully: Yes  Morbid Ideation: no   Suicide Assessment: no suicidal ideation, plan, or intent  Homicidal Ideation: no    A:  Not completed due to COVID limitations. Assessment/Progress in treatment/Treatment plan:  Patient initially presented for treatment for depression and had successfully completed an episode of care. Recently his wife passed away unexpectedly. Pt is engaging in treatment for stress and grief. Will continue follow-up. Diagnosis:    1. Current moderate episode of major depressive disorder without prior episode [facility])       Patient Active Problem List   Diagnosis    Essential hypertension    Pure hypercholesterolemia    Hypertensive kidney disease with stage 3 chronic kidney disease    Calculus of kidney    Migraine    GERD (gastroesophageal reflux disease)    Hypothyroidism due to acquired atrophy of thyroid    BPH (benign prostatic hyperplasia)    Bilateral renal cysts    Anemia    Constipation    Vision impairment    Primary insomnia    Macular degeneration    Cataract, right eye    Open-angle glaucoma    Vasomotor rhinitis    Pain in both feet    B12 deficiency    Cold intolerance    Numbness and tingling    Major depressive disorder with single episode, in partial remission ([facility] [address]    Grief reaction        Plan: 1) practice start stop goals from today (e.g., humble self to granddaughter, stop yelling at her when you are mad)    Follow-up:   Return in about 4 weeks (around 4/16/2021).      Pt interventions:  Established rapport, [place]-setting to identify pt's primary goals for [facility] visit / overall health, Supportive techniques, Engaged in values clarification, Set plan for engaging in more value-guided action, Praised pt for use of skills and Trained in improving communication skills/assertive communication, assisted family is problem-solving issues

## 2021-03-21 RX ORDER — MIRTAZAPINE 7.5 MG/1
TABLET, FILM COATED ORAL
Qty: 90 TABLET | Refills: 1 | Status: SHIPPED | OUTPATIENT
Start: 2021-03-21 | End: 2022-02-08 | Stop reason: SDUPTHER

## 2021-04-14 NOTE — PROGRESS NOTES
balance training, but will continue to use his cane. Return in about 3 months (around 7/16/2021) for MEDICARE AWV. Vitals:    04/16/21 1432   BP: 122/74   Pulse: 64   Weight: 126 lb (57.2 kg)   Height: 5' 3\" (1.6 m)      Estimated body mass index is 22.32 kg/m² as calculated from the following:    Height as of this encounter: 5' 3\" (1.6 m). Weight as of this encounter: 126 lb (57.2 kg). Wt Readings from Last 3 Encounters:   04/16/21 126 lb (57.2 kg)   09/10/20 124 lb (56.2 kg)   03/10/20 127 lb (57.6 kg)     BP Readings from Last 3 Encounters:   04/16/21 122/74   09/10/20 136/80   03/10/20 136/82     HPI  Hypertension/CKD:  Home blood pressure monitoring: No.  He is adherent to a low sodium diet. Patient denies chest pain, shortness of breath, headache, lightheadedness and palpitations.  Antihypertensive medication side effects: no medication side effects noted.  Use of agents associated with hypertension: none.  No NSAIDs.     Hyperlipidemia:  No new myalgias or GI upset current dose of atorvastatin (Lipitor).  Medication compliance: compliant all of the time. Patient is not following a low fat, low cholesterol diet.  No regular exercise.     Hypothyroidism: Recent symptoms: dry itchy skin, constipation.  He denies fatigue, weight gain, change in bowel habits, palpitations. Patient is taking his medication consistently on an empty stomach.     Insomnia/Depression/Grief:  Current treatment: trazodone 100 mg qhs, Remeron 7.5 mg qhs, which has been effective.  Medication side effects: none. Current symptoms include intermittent anhedonia/diminished interest in activities, depressed mood, fatigue and feelings of worthlessness- related to wife's sudden death 8/20 and visual loss. Patient denies  impaired memory, change in appetite or suicidal ideation. weight stable. Last [facility] visit 3/19/21, next appointment 4/19.       ROS  As documented above    Physical Exam  Constitutional:       General: He is not in acute distress. Appearance: He is well-developed. Eyes:      Conjunctiva/sclera: Conjunctivae normal.      Comments: Left eye artificial   Neck:      Musculoskeletal: Neck supple. Thyroid: No thyroid mass or thyromegaly. Vascular: No carotid bruit or JVD. Cardiovascular:      Rate and Rhythm: Normal rate and regular rhythm. Heart sounds: Normal heart sounds. No murmur. No friction rub. No gallop. Pulmonary:      Effort: Pulmonary effort is normal. No respiratory distress. Breath sounds: Normal breath sounds. No wheezing, rhonchi or rales. Abdominal:      General: There is no distension. Palpations: Abdomen is soft. Tenderness: There is no abdominal tenderness. Musculoskeletal:      Right lower leg: No edema. Left lower leg: No edema. Lymphadenopathy:      Cervical: No cervical adenopathy. Skin:     General: Skin is warm and dry. Findings: No erythema or rash. Neurological:      Mental Status: He is alert and oriented to person, place, and time. Psychiatric:         Speech: Speech normal.         Behavior: Behavior normal.         Thought Content: Thought content normal.         Judgment: Judgment normal.           --Mahin Ca MD on 4/16/2021 at 8:06 PM    An electronic signature was used to authenticate this note.

## 2021-04-16 ENCOUNTER — OFFICE VISIT (OUTPATIENT)
Dept: INTERNAL MEDICINE CLINIC | Age: 86
End: 2021-04-16
Payer: MEDICARE

## 2021-04-16 VITALS
BODY MASS INDEX: 22.32 KG/M2 | SYSTOLIC BLOOD PRESSURE: 122 MMHG | HEIGHT: 63 IN | DIASTOLIC BLOOD PRESSURE: 74 MMHG | HEART RATE: 64 BPM | WEIGHT: 126 LBS

## 2021-04-16 DIAGNOSIS — E53.8 B12 DEFICIENCY: ICD-10-CM

## 2021-04-16 DIAGNOSIS — F43.21 GRIEF REACTION: ICD-10-CM

## 2021-04-16 DIAGNOSIS — F51.01 PRIMARY INSOMNIA: ICD-10-CM

## 2021-04-16 DIAGNOSIS — F32.4 MAJOR DEPRESSIVE DISORDER WITH SINGLE EPISODE, IN PARTIAL REMISSION (HCC): ICD-10-CM

## 2021-04-16 DIAGNOSIS — E03.4 HYPOTHYROIDISM DUE TO ACQUIRED ATROPHY OF THYROID: ICD-10-CM

## 2021-04-16 DIAGNOSIS — I12.9 HYPERTENSIVE KIDNEY DISEASE WITH STAGE 3B CHRONIC KIDNEY DISEASE (HCC): ICD-10-CM

## 2021-04-16 DIAGNOSIS — D64.9 ANEMIA, UNSPECIFIED TYPE: ICD-10-CM

## 2021-04-16 DIAGNOSIS — Z91.81 AT HIGH RISK FOR FALLS: ICD-10-CM

## 2021-04-16 DIAGNOSIS — I10 ESSENTIAL HYPERTENSION: Primary | ICD-10-CM

## 2021-04-16 DIAGNOSIS — N18.32 HYPERTENSIVE KIDNEY DISEASE WITH STAGE 3B CHRONIC KIDNEY DISEASE (HCC): ICD-10-CM

## 2021-04-16 DIAGNOSIS — E78.00 PURE HYPERCHOLESTEROLEMIA: ICD-10-CM

## 2021-04-16 PROBLEM — R29.6 FREQUENT FALLS: Status: RESOLVED | Noted: 2021-04-16 | Resolved: 2021-04-16

## 2021-04-16 PROBLEM — R29.6 FREQUENT FALLS: Status: ACTIVE | Noted: 2021-04-16

## 2021-04-16 PROCEDURE — 1036F TOBACCO NON-USER: CPT | Performed by: INTERNAL MEDICINE

## 2021-04-16 PROCEDURE — 4040F PNEUMOC VAC/ADMIN/RCVD: CPT | Performed by: INTERNAL MEDICINE

## 2021-04-16 PROCEDURE — 99214 OFFICE O/P EST MOD 30 MIN: CPT | Performed by: INTERNAL MEDICINE

## 2021-04-16 PROCEDURE — G8427 DOCREV CUR MEDS BY ELIG CLIN: HCPCS | Performed by: INTERNAL MEDICINE

## 2021-04-16 PROCEDURE — G8420 CALC BMI NORM PARAMETERS: HCPCS | Performed by: INTERNAL MEDICINE

## 2021-04-16 PROCEDURE — 1123F ACP DISCUSS/DSCN MKR DOCD: CPT | Performed by: INTERNAL MEDICINE

## 2021-04-16 RX ORDER — POLYETHYLENE GLYCOL 3350 17 G/17G
POWDER, FOR SOLUTION ORAL
COMMUNITY
Start: 2021-04-16

## 2021-04-16 NOTE — LETTER
[facility]  [address] [place] [address]  Phone: [phone]  Fax: [phone]    Ernestine Umaña MD        April 16, 2021     Patient: Reyes Seen   YOB: 1935   Date of Visit: 4/16/2021       To Whom It May Concern: It is my medical opinion that Nel Owen requires a disability parking placard for the following reasons:  He is legally blind.   Duration of need: 5 years    Sincerely,        Ernestine Umaña MD

## 2021-04-16 NOTE — ASSESSMENT & PLAN NOTE
Dry itchy skin and constipation are likely multifactorial, but will adjust levothyroxine dose if indicated by lab results.

## 2021-04-17 NOTE — ASSESSMENT & PLAN NOTE
Asymptomatic. Suspect related to chronic renal insufficiency, but will check CBC and iron studies to ensure stability and to rule out GI blood loss.

## 2021-04-17 NOTE — ASSESSMENT & PLAN NOTE
Falls occurred outdoors while visiting his dog's grave. There were no injuries associated with these falls, and there has been no recurrence since he started using his cane consistently. He declines referral to physical therapy for strength and balance training, but will continue to use his cane.

## 2021-04-19 ENCOUNTER — TELEPHONE (OUTPATIENT)
Dept: PSYCHOLOGY | Age: 86
End: 2021-04-19

## 2021-04-19 DIAGNOSIS — I12.9 HYPERTENSIVE KIDNEY DISEASE WITH STAGE 3 CHRONIC KIDNEY DISEASE (HCC): ICD-10-CM

## 2021-04-19 DIAGNOSIS — D64.9 ANEMIA, UNSPECIFIED TYPE: ICD-10-CM

## 2021-04-19 DIAGNOSIS — E03.4 HYPOTHYROIDISM DUE TO ACQUIRED ATROPHY OF THYROID: ICD-10-CM

## 2021-04-19 DIAGNOSIS — N18.30 HYPERTENSIVE KIDNEY DISEASE WITH STAGE 3 CHRONIC KIDNEY DISEASE (HCC): ICD-10-CM

## 2021-04-19 DIAGNOSIS — E78.00 PURE HYPERCHOLESTEROLEMIA: ICD-10-CM

## 2021-04-19 LAB
A/G RATIO: 1.9 (ref 1.1–2.2)
ALBUMIN SERPL-MCNC: 4.3 G/DL (ref 3.4–5)
ALP BLD-CCNC: 80 U/L (ref 40–129)
ALT SERPL-CCNC: 8 U/L (ref 10–40)
ANION GAP SERPL CALCULATED.3IONS-SCNC: 14 MMOL/L (ref 3–16)
AST SERPL-CCNC: 8 U/L (ref 15–37)
BILIRUB SERPL-MCNC: 0.4 MG/DL (ref 0–1)
BUN BLDV-MCNC: 19 MG/DL (ref 7–20)
CALCIUM SERPL-MCNC: 9.4 MG/DL (ref 8.3–10.6)
CHLORIDE BLD-SCNC: 106 MMOL/L (ref 99–110)
CHOLESTEROL, FASTING: 148 MG/DL (ref 0–199)
CO2: 24 MMOL/L (ref 21–32)
CREAT SERPL-MCNC: 1.6 MG/DL (ref 0.8–1.3)
GFR AFRICAN AMERICAN: 50
GFR NON-AFRICAN AMERICAN: 41
GLOBULIN: 2.3 G/DL
GLUCOSE FASTING: 87 MG/DL (ref 70–99)
HDLC SERPL-MCNC: 48 MG/DL (ref 40–60)
LDL CHOLESTEROL CALCULATED: 72 MG/DL
POTASSIUM SERPL-SCNC: 5.1 MMOL/L (ref 3.5–5.1)
SODIUM BLD-SCNC: 144 MMOL/L (ref 136–145)
TOTAL PROTEIN: 6.6 G/DL (ref 6.4–8.2)
TRIGLYCERIDE, FASTING: 138 MG/DL (ref 0–150)
TSH SERPL DL<=0.05 MIU/L-ACNC: 3.93 UIU/ML (ref 0.27–4.2)
VLDLC SERPL CALC-MCNC: 28 MG/DL

## 2021-04-19 NOTE — TELEPHONE ENCOUNTER
Patient just called and stated he received a call from Dr. Kirstie Reyez at 11:00, which he missed because he was in the car. He is requesting to please be called back at the phone # provided.

## 2021-04-21 LAB — SOLUBLE TRANSFERRIN RECEPT: 2.8 MG/L (ref 2.2–5)

## 2021-05-20 RX ORDER — TRAZODONE HYDROCHLORIDE 100 MG/1
TABLET ORAL
Qty: 90 TABLET | Refills: 1 | Status: SHIPPED | OUTPATIENT
Start: 2021-05-20 | End: 2021-11-16

## 2021-07-30 ENCOUNTER — OFFICE VISIT (OUTPATIENT)
Dept: INTERNAL MEDICINE CLINIC | Age: 86
End: 2021-07-30
Payer: MEDICARE

## 2021-07-30 VITALS
HEART RATE: 84 BPM | OXYGEN SATURATION: 98 % | RESPIRATION RATE: 16 BRPM | BODY MASS INDEX: 22.32 KG/M2 | HEIGHT: 63 IN | SYSTOLIC BLOOD PRESSURE: 135 MMHG | WEIGHT: 126 LBS | DIASTOLIC BLOOD PRESSURE: 75 MMHG

## 2021-07-30 DIAGNOSIS — I10 ESSENTIAL HYPERTENSION: ICD-10-CM

## 2021-07-30 DIAGNOSIS — Z00.00 ROUTINE GENERAL MEDICAL EXAMINATION AT A HEALTH CARE FACILITY: Primary | ICD-10-CM

## 2021-07-30 DIAGNOSIS — F32.1 CURRENT MODERATE EPISODE OF MAJOR DEPRESSIVE DISORDER WITHOUT PRIOR EPISODE (HCC): ICD-10-CM

## 2021-07-30 DIAGNOSIS — F51.01 PRIMARY INSOMNIA: ICD-10-CM

## 2021-07-30 PROBLEM — Z91.81 AT HIGH RISK FOR FALLS: Status: RESOLVED | Noted: 2021-04-16 | Resolved: 2021-07-30

## 2021-07-30 PROCEDURE — 4040F PNEUMOC VAC/ADMIN/RCVD: CPT | Performed by: INTERNAL MEDICINE

## 2021-07-30 PROCEDURE — G8420 CALC BMI NORM PARAMETERS: HCPCS | Performed by: INTERNAL MEDICINE

## 2021-07-30 PROCEDURE — 1036F TOBACCO NON-USER: CPT | Performed by: INTERNAL MEDICINE

## 2021-07-30 PROCEDURE — 1123F ACP DISCUSS/DSCN MKR DOCD: CPT | Performed by: INTERNAL MEDICINE

## 2021-07-30 PROCEDURE — G0439 PPPS, SUBSEQ VISIT: HCPCS | Performed by: INTERNAL MEDICINE

## 2021-07-30 PROCEDURE — G8427 DOCREV CUR MEDS BY ELIG CLIN: HCPCS | Performed by: INTERNAL MEDICINE

## 2021-07-30 PROCEDURE — 99213 OFFICE O/P EST LOW 20 MIN: CPT | Performed by: INTERNAL MEDICINE

## 2021-07-30 RX ORDER — GUAIFENESIN 600 MG/1
1200 TABLET, EXTENDED RELEASE ORAL 2 TIMES DAILY PRN
COMMUNITY
Start: 2021-07-30

## 2021-07-30 SDOH — ECONOMIC STABILITY: TRANSPORTATION INSECURITY
IN THE PAST 12 MONTHS, HAS THE LACK OF TRANSPORTATION KEPT YOU FROM MEDICAL APPOINTMENTS OR FROM GETTING MEDICATIONS?: NO

## 2021-07-30 SDOH — ECONOMIC STABILITY: FOOD INSECURITY: WITHIN THE PAST 12 MONTHS, THE FOOD YOU BOUGHT JUST DIDN'T LAST AND YOU DIDN'T HAVE MONEY TO GET MORE.: NEVER TRUE

## 2021-07-30 SDOH — ECONOMIC STABILITY: HOUSING INSECURITY
IN THE LAST 12 MONTHS, WAS THERE A TIME WHEN YOU DID NOT HAVE A STEADY PLACE TO SLEEP OR SLEPT IN A SHELTER (INCLUDING NOW)?: NO

## 2021-07-30 SDOH — ECONOMIC STABILITY: TRANSPORTATION INSECURITY
IN THE PAST 12 MONTHS, HAS LACK OF TRANSPORTATION KEPT YOU FROM MEETINGS, WORK, OR FROM GETTING THINGS NEEDED FOR DAILY LIVING?: NO

## 2021-07-30 SDOH — ECONOMIC STABILITY: FOOD INSECURITY: WITHIN THE PAST 12 MONTHS, YOU WORRIED THAT YOUR FOOD WOULD RUN OUT BEFORE YOU GOT MONEY TO BUY MORE.: NEVER TRUE

## 2021-07-30 SDOH — ECONOMIC STABILITY: INCOME INSECURITY: IN THE LAST 12 MONTHS, WAS THERE A TIME WHEN YOU WERE NOT ABLE TO PAY THE MORTGAGE OR RENT ON TIME?: NO

## 2021-07-30 ASSESSMENT — PATIENT HEALTH QUESTIONNAIRE - PHQ9
5. POOR APPETITE OR OVEREATING: 0
7. TROUBLE CONCENTRATING ON THINGS, SUCH AS READING THE NEWSPAPER OR WATCHING TELEVISION: 0
SUM OF ALL RESPONSES TO PHQ QUESTIONS 1-9: 4
6. FEELING BAD ABOUT YOURSELF - OR THAT YOU ARE A FAILURE OR HAVE LET YOURSELF OR YOUR FAMILY DOWN: 1
SUM OF ALL RESPONSES TO PHQ QUESTIONS 1-9: 4
8. MOVING OR SPEAKING SO SLOWLY THAT OTHER PEOPLE COULD HAVE NOTICED. OR THE OPPOSITE, BEING SO FIGETY OR RESTLESS THAT YOU HAVE BEEN MOVING AROUND A LOT MORE THAN USUAL: 0
3. TROUBLE FALLING OR STAYING ASLEEP: 0
SUM OF ALL RESPONSES TO PHQ9 QUESTIONS 1 & 2: 3
4. FEELING TIRED OR HAVING LITTLE ENERGY: 0
9. THOUGHTS THAT YOU WOULD BE BETTER OFF DEAD, OR OF HURTING YOURSELF: 1
4. FEELING TIRED OR HAVING LITTLE ENERGY: 0
9. THOUGHTS THAT YOU WOULD BE BETTER OFF DEAD, OR OF HURTING YOURSELF: 0
3. TROUBLE FALLING OR STAYING ASLEEP: 0
1. LITTLE INTEREST OR PLEASURE IN DOING THINGS: 0
5. POOR APPETITE OR OVEREATING: 0
7. TROUBLE CONCENTRATING ON THINGS, SUCH AS READING THE NEWSPAPER OR WATCHING TELEVISION: 0
SUM OF ALL RESPONSES TO PHQ QUESTIONS 1-9: 3
2. FEELING DOWN, DEPRESSED OR HOPELESS: 3
8. MOVING OR SPEAKING SO SLOWLY THAT OTHER PEOPLE COULD HAVE NOTICED. OR THE OPPOSITE, BEING SO FIGETY OR RESTLESS THAT YOU HAVE BEEN MOVING AROUND A LOT MORE THAN USUAL: 0
SUM OF ALL RESPONSES TO PHQ QUESTIONS 1-9: 4
SUM OF ALL RESPONSES TO PHQ QUESTIONS 1-9: 4
6. FEELING BAD ABOUT YOURSELF - OR THAT YOU ARE A FAILURE OR HAVE LET YOURSELF OR YOUR FAMILY DOWN: 0
SUM OF ALL RESPONSES TO PHQ QUESTIONS 1-9: 4
2. FEELING DOWN, DEPRESSED OR HOPELESS: 3
10. IF YOU CHECKED OFF ANY PROBLEMS, HOW DIFFICULT HAVE THESE PROBLEMS MADE IT FOR YOU TO DO YOUR WORK, TAKE CARE OF THINGS AT HOME, OR GET ALONG WITH OTHER PEOPLE: 0

## 2021-07-30 ASSESSMENT — SOCIAL DETERMINANTS OF HEALTH (SDOH): HOW HARD IS IT FOR YOU TO PAY FOR THE VERY BASICS LIKE FOOD, HOUSING, MEDICAL CARE, AND HEATING?: NOT HARD AT ALL

## 2021-07-30 ASSESSMENT — COLUMBIA-SUICIDE SEVERITY RATING SCALE - C-SSRS
2. HAVE YOU ACTUALLY HAD ANY THOUGHTS OF KILLING YOURSELF?: YES
5. HAVE YOU STARTED TO WORK OUT OR WORKED OUT THE DETAILS OF HOW TO KILL YOURSELF? DO YOU INTEND TO CARRY OUT THIS PLAN?: NO
3. HAVE YOU BEEN THINKING ABOUT HOW YOU MIGHT KILL YOURSELF?: NO
6. HAVE YOU EVER DONE ANYTHING, STARTED TO DO ANYTHING, OR PREPARED TO DO ANYTHING TO END YOUR LIFE?: NO
1. WITHIN THE PAST MONTH, HAVE YOU WISHED YOU WERE DEAD OR WISHED YOU COULD GO TO SLEEP AND NOT WAKE UP?: YES
4. HAVE YOU HAD THESE THOUGHTS AND HAD SOME INTENTION OF ACTING ON THEM?: YES

## 2021-07-30 ASSESSMENT — LIFESTYLE VARIABLES: HOW OFTEN DO YOU HAVE A DRINK CONTAINING ALCOHOL: 0

## 2021-07-30 NOTE — PATIENT INSTRUCTIONS
Needs to drink 48-64 ounces of fluid/day. Medicare  o Boy Sanders  i. Sudarshan Psychologist  ii. Private insurance and medicare  iii. [address] [facility], [address] [facility]  iv. [phone]  v. [facility]    o [employer]  i. Accepts many insurances and also offers self-pay discounts  ii. Many different locations across [place] and [place]  iii. [phone]  iv. www.[employer]. net    o [employer]  i. [facility], [address]  ii. [phone]  iii. www.[employer]    o [facility]  i. Appears to have a [facility] affiliation  ii. [place]  [address], [place], [address] [place] [address]  iii. [facility] location as well  iv. [phone]  v. [facility]. Jaskaran Hare, Ph.D.  i. Liban Givens  ii. [address], [address], [address] [address]  iii. [address] Accepts many private insurances and Medicare  ii. [address] [facility], [address] [place] [address], Navneet Mars  iii. [phone] ext. [phone] [facility]  i. Accepts Medicare  ii. [address] [facility], [address] [place] [address]  iii. [address] [facility] [address] Accepts Medicare   ii. [facility], [address] [place] [address]  iii. [address] [facility] Accepts Medicare and other private insurances  ii. [address], [address]  iii. [phone]  iv. http://www.ayaz.info/. en.html    o Eliseo Cruz  i. Accepts medicare and many private insurances  ii. [address] [facility], [address] [place] [address] Avenue, [address] [address]  iii. [phone]    Personalized Preventive Plan for Monico Loveless - 7/30/2021  Medicare offers a range of preventive health benefits. Some of the tests and screenings are paid in full while other may be subject to a deductible, co-insurance, and/or copay.     Some of these benefits include a comprehensive review of your medical history including lifestyle, illnesses that may run in your family, and various assessments and screenings as appropriate. After reviewing your medical record and screening and assessments performed today your provider may have ordered immunizations, labs, imaging, and/or referrals for you. A list of these orders (if applicable) as well as your Preventive Care list are included within your After Visit Summary for your review. Other Preventive Recommendations:    · A preventive eye exam performed by an eye specialist is recommended every 1-2 years to screen for glaucoma; cataracts, macular degeneration, and other eye disorders. · A preventive dental visit is recommended every 6 months. · Try to get at least 150 minutes of exercise per week or 10,000 steps per day on a pedometer . · Order or download the FREE \"Exercise & Physical Activity: Your Everyday Guide\" from [employer]. Call 4-420.902.5261 or search [employer] online. · You need 0211-6168 mg of calcium and 4538-4035 IU of vitamin D per day. It is possible to meet your calcium requirement with diet alone, but a vitamin D supplement is usually necessary to meet this goal.  · When exposed to the sun, use a sunscreen that protects against both UVA and UVB radiation with an SPF of 30 or greater. Reapply every 2 to 3 hours or after sweating, drying off with a towel, or swimming. · Always wear a seat belt when traveling in a car. Always wear a helmet when riding a bicycle or motorcycle.
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
## 2021-07-30 NOTE — Clinical Note
Please call patient to offer him a pastoral care referral while he waits to see a therapist since there may be a significant delay.

## 2021-07-30 NOTE — ASSESSMENT & PLAN NOTE
Despite PHQ 9 score of 4, suicidal ideation warrants more aggressive therapy. He assured me that he has no intention of harming himself, although he feels that he and his loved ones would be better off if he was dead. He feels this way due to loss of his wife and dog, and his deteriorating vision. He can no longer engage in many of the activities that he previously enjoyed and feels like a burden to his family. Since he did well with [facility] intervention in the past, recommended restarting counseling, which he is willing to do. He was provided with a list of psychotherapists who take Medicare, and he will ask his family to help him obtain an appointment. Will offer pastoral care referral if there will be a significant delay. He does not want to take additional medication. He promised to tell his family, call this office or call 911 for any active suicidal thoughts.

## 2021-08-02 ENCOUNTER — TELEPHONE (OUTPATIENT)
Dept: INTERNAL MEDICINE CLINIC | Age: 86
End: 2021-08-02

## 2021-08-02 DIAGNOSIS — K92.1 MELENA: Primary | ICD-10-CM

## 2021-08-02 NOTE — TELEPHONE ENCOUNTER
Patient states problem has been resolved, he has been consuming a lot of water and stools returned back to normal

## 2021-08-02 NOTE — TELEPHONE ENCOUNTER
The following message was left on the Non-Emergency Line:    Patient states that he forgot to mention at his appointment last week, that he has been having \"black stools. \"    He didn't provide any additional information. Patient can be reached @ phone # provided should there be any questions.

## 2021-08-02 NOTE — TELEPHONE ENCOUNTER
He needs to complete a set of 3 hemoccult cards (not FIT test) and I ordered a CBC with iron studies that should be drawn.

## 2021-08-03 NOTE — TELEPHONE ENCOUNTER
Patient advised and will come to the lab to have the blood work drawn. I also gave Nicole Mullins a set of Hemoccult cards to provide to the patient when he comes in as well.

## 2021-08-09 ENCOUNTER — NURSE ONLY (OUTPATIENT)
Dept: INTERNAL MEDICINE CLINIC | Age: 86
End: 2021-08-09

## 2021-08-09 DIAGNOSIS — D64.9 ANEMIA, UNSPECIFIED TYPE: Primary | ICD-10-CM

## 2021-10-28 NOTE — PROGRESS NOTES
Date of Visit:  10/29/2021    CC: Willian Oleary (: 1935) is a 80 y.o. male, established patient, here for evaluation/re-evaluation of the following medical concerns:    ASSESSMENT/PLAN:  Essential hypertension  Assessment & Plan:  Well-controlled. Continue current medications. Orders:  -     Comprehensive Metabolic Panel, Fasting; Future  Hypertensive kidney disease with stage 3b chronic kidney disease ([facility] [address]  Assessment & Plan:  Asymptomatic. He will continue to avoid NSAIDs and other nephrotoxins. Pure hypercholesterolemia  Assessment & Plan: Tolerating current dose(s) of Lipitor- continue. Orders:  -     Lipid, Fasting; Future  Hypothyroidism due to acquired atrophy of thyroid  Assessment & Plan:  Dry itchy skin and constipation are likely multifactorial, but will adjust levothyroxine dose if indicated by lab results. Orders:  -     TSH without Reflex; Future  Primary insomnia  Assessment & Plan:  Doing well on current doses of trazodone and Remeron- continue. Current moderate episode of major depressive disorder without prior episode [facility])  Assessment & Plan:  Improved, but still having significant residual symptoms. He is now willing to seek counseling, so was provided with new list of providers who take Medicare. He does not want to take additional medication at this point. Patient will call if symptoms change or worsen. Grief reaction  Assessment & Plan:  See plan for depression. Return in about 6 months (around 2022) for 30 MIN F/U. Vitals:    10/29/21 1435 10/29/21 1505   BP: (!) 142/88 138/78   Pulse: 74    Resp: 16    SpO2: 98%    Weight: 126 lb (57.2 kg)    Height: 5' 3\" (1.6 m)       Estimated body mass index is 22.32 kg/m² as calculated from the following:    Height as of this encounter: 5' 3\" (1.6 m). Weight as of this encounter: 126 lb (57.2 kg).      Wt Readings from Last 3 Encounters:   10/29/21 126 lb (57.2 kg)   21 126 lb (57.2 kg)   21 126 lb (57.2 kg)     BP Readings from Last 3 Encounters:   10/29/21 138/78   07/30/21 135/75   04/16/21 122/74     HPI  Hypertension/CKD:  Home blood pressure monitoring: No.  He is adherent to a low sodium diet. Patient denies chest pain, shortness of breath, headache, lightheadedness and palpitations.  Antihypertensive medication side effects: no medication side effects noted.  Use of agents associated with hypertension: none.  No NSAIDs.     Hyperlipidemia:  No new myalgias or GI upset current dose of atorvastatin (Lipitor).  Medication compliance: compliant all of the time. Patient is not following a low fat, low cholesterol diet.  No regular exercise.     Hypothyroidism: Recent symptoms: dry itchy skin, constipation.  He denies fatigue, weight gain, change in bowel habits, palpitations. Patient is taking his medication consistently on an empty stomach.     Insomnia/Depression/Grief:  Current treatment: trazodone 100 mg qhs, Remeron 7.5 mg qhs, which has been effective.  Medication side effects: none. Current symptoms include intermittent anhedonia/diminished interest in activities, depressed mood, fatigue and feelings of worthlessness- related to wife's sudden death 8/20 and visual loss- symptoms improved. Suicidal ideation resolved. Patient denies impaired memory, change in appetite. weight stable. Counseling- has not pursued yet, but willing. ROS  As documented above    Physical Exam  Constitutional:       General: He is not in acute distress. Appearance: He is well-developed. Eyes:      Conjunctiva/sclera: Conjunctivae normal.      Comments: Left eye artificial   Neck:      Thyroid: No thyroid mass or thyromegaly. Vascular: No carotid bruit or JVD. Cardiovascular:      Rate and Rhythm: Normal rate and regular rhythm. Heart sounds: Normal heart sounds. No murmur heard. No friction rub. No gallop. Pulmonary:      Effort: Pulmonary effort is normal. No respiratory distress.       Breath sounds: Normal breath sounds. No wheezing, rhonchi or rales. Abdominal:      General: There is no distension. Palpations: Abdomen is soft. Tenderness: There is no abdominal tenderness. Musculoskeletal:      Cervical back: Neck supple. Right lower leg: No edema. Left lower leg: No edema. Lymphadenopathy:      Cervical: No cervical adenopathy. Skin:     General: Skin is warm and dry. Findings: No erythema or rash. Neurological:      Mental Status: He is alert and oriented to person, place, and time. Psychiatric:         Speech: Speech normal.         Behavior: Behavior normal.         Thought Content: Thought content normal.         Judgment: Judgment normal.          --Ally Gonzalez MD on 10/29/2021 at 3:46 PM    An electronic signature was used to authenticate this note.

## 2021-10-29 ENCOUNTER — OFFICE VISIT (OUTPATIENT)
Dept: INTERNAL MEDICINE CLINIC | Age: 86
End: 2021-10-29
Payer: MEDICARE

## 2021-10-29 VITALS
WEIGHT: 126 LBS | DIASTOLIC BLOOD PRESSURE: 78 MMHG | RESPIRATION RATE: 16 BRPM | HEIGHT: 63 IN | OXYGEN SATURATION: 98 % | HEART RATE: 74 BPM | BODY MASS INDEX: 22.32 KG/M2 | SYSTOLIC BLOOD PRESSURE: 138 MMHG

## 2021-10-29 DIAGNOSIS — I10 ESSENTIAL HYPERTENSION: Primary | ICD-10-CM

## 2021-10-29 DIAGNOSIS — E03.4 HYPOTHYROIDISM DUE TO ACQUIRED ATROPHY OF THYROID: ICD-10-CM

## 2021-10-29 DIAGNOSIS — F43.21 GRIEF REACTION: ICD-10-CM

## 2021-10-29 DIAGNOSIS — N18.32 HYPERTENSIVE KIDNEY DISEASE WITH STAGE 3B CHRONIC KIDNEY DISEASE (HCC): ICD-10-CM

## 2021-10-29 DIAGNOSIS — F51.01 PRIMARY INSOMNIA: ICD-10-CM

## 2021-10-29 DIAGNOSIS — I12.9 HYPERTENSIVE KIDNEY DISEASE WITH STAGE 3B CHRONIC KIDNEY DISEASE (HCC): ICD-10-CM

## 2021-10-29 DIAGNOSIS — F32.1 CURRENT MODERATE EPISODE OF MAJOR DEPRESSIVE DISORDER WITHOUT PRIOR EPISODE (HCC): ICD-10-CM

## 2021-10-29 DIAGNOSIS — E78.00 PURE HYPERCHOLESTEROLEMIA: ICD-10-CM

## 2021-10-29 PROCEDURE — G0008 ADMIN INFLUENZA VIRUS VAC: HCPCS | Performed by: INTERNAL MEDICINE

## 2021-10-29 PROCEDURE — 1036F TOBACCO NON-USER: CPT | Performed by: INTERNAL MEDICINE

## 2021-10-29 PROCEDURE — 99214 OFFICE O/P EST MOD 30 MIN: CPT | Performed by: INTERNAL MEDICINE

## 2021-10-29 PROCEDURE — G8484 FLU IMMUNIZE NO ADMIN: HCPCS | Performed by: INTERNAL MEDICINE

## 2021-10-29 PROCEDURE — G8427 DOCREV CUR MEDS BY ELIG CLIN: HCPCS | Performed by: INTERNAL MEDICINE

## 2021-10-29 PROCEDURE — 1123F ACP DISCUSS/DSCN MKR DOCD: CPT | Performed by: INTERNAL MEDICINE

## 2021-10-29 PROCEDURE — 4040F PNEUMOC VAC/ADMIN/RCVD: CPT | Performed by: INTERNAL MEDICINE

## 2021-10-29 PROCEDURE — G8420 CALC BMI NORM PARAMETERS: HCPCS | Performed by: INTERNAL MEDICINE

## 2021-10-29 PROCEDURE — 90694 VACC AIIV4 NO PRSRV 0.5ML IM: CPT | Performed by: INTERNAL MEDICINE

## 2021-10-29 NOTE — ASSESSMENT & PLAN NOTE
Improved, but still having significant residual symptoms. He is now willing to seek counseling, so was provided with new list of providers who take Medicare. He does not want to take additional medication at this point. Patient will call if symptoms change or worsen.

## 2021-10-29 NOTE — PATIENT INSTRUCTIONS
Schedule booster for Mildred Aliza vaccine at your pharmacy    Medicare Therapists  o Mariano Kaufman. Hakan Kulkarni  i. Sudarshan Psychologist  ii. Private insurance and medicare  iii. [address] [facility], [address] [place] [address], [address] [facility]  iv. [phone]  v. [facility]    o [employer]  i. Accepts many insurances and also offers self-pay discounts  ii. Many different locations across [address] [place] [address] and [place]  iii. [phone]  iv. [email or web address] net    o [employer]  i. [facility], [address] [place] [address], [address]  ii. [phone]  iii. [email or web address]    o [employer]  i. Appears to have a [facility] affiliation  ii. [address], [address] [place] [address], [address] [place] [address]  iii. [facility] location as well  iv. [phone]  v. [facility]. Ev Vazquez, Ph.D.  i. Valente Monday  ii. [address], [address], [address] [address]  iii. [address] [place] Accepts many private insurances and Medicare  ii. [address]  iii. [phone] ext. [phone] [facility] [place]  i. Accepts Medicare  ii. [facility], [facility], [address] [place] [address]  iii. [address] [facility] Accepts Medicare   ii. [facility], [address] [place] [address]  iii. [address] [facility] Accepts Medicare and other private insurances  ii. [address], [address] [place] [address], [address] [place] [address]  iii. [phone]  iv. [email or web address]    o Eliseo Cruz  i. Accepts medicare and many private insurances  ii. [address] [facility], [address] [place] [address], [address] [address]  iii.  [phone]

## 2021-11-03 ENCOUNTER — TELEPHONE (OUTPATIENT)
Dept: INTERNAL MEDICINE CLINIC | Age: 86
End: 2021-11-03

## 2021-11-03 NOTE — TELEPHONE ENCOUNTER
Patient called to get his lab results he stated that he has not been taking the Lipitor daily but he will start now.

## 2021-11-05 RX ORDER — ATORVASTATIN CALCIUM 20 MG/1
20 TABLET, FILM COATED ORAL DAILY
Qty: 90 TABLET | Refills: 1 | Status: SHIPPED | OUTPATIENT
Start: 2021-11-05 | End: 2022-05-16 | Stop reason: SDUPTHER

## 2021-11-05 NOTE — TELEPHONE ENCOUNTER
----- Message from Belinda Gandhi sent at 11/5/2021 10:41 AM EDT -----  Subject: Refill Request    QUESTIONS  Name of Medication? atorvastatin (LIPITOR) 20 MG tablet  Patient-reported dosage and instructions? As needed  How many days do you have left? 0  Preferred Pharmacy? [facility]  Pharmacy phone number (if available)? [phone]  ---------------------------------------------------------------------------  --------------  Jania DON  What is the best way for the office to contact you? OK to leave message on   voicemail  Preferred Call Back Phone Number?  [phone]

## 2021-11-16 RX ORDER — TRAZODONE HYDROCHLORIDE 100 MG/1
TABLET ORAL
Qty: 90 TABLET | Refills: 1 | Status: SHIPPED | OUTPATIENT
Start: 2021-11-16

## 2022-02-08 RX ORDER — MIRTAZAPINE 7.5 MG/1
TABLET, FILM COATED ORAL
Qty: 90 TABLET | Refills: 1 | Status: SHIPPED | OUTPATIENT
Start: 2022-02-08 | End: 2022-04-25 | Stop reason: SDUPTHER

## 2022-02-08 NOTE — TELEPHONE ENCOUNTER
Patient called to have the following medication refilled:      mirtazapine (REMERON) 7.5 MG tablet TAKE ONE TABLET BY MOUTH ONCE NIGHTLY       [facility] [address] [place] [address] Abigail Chen [phone]     Last appointment: 10/29/2021  Next appointment: 4/29/2022  Last refill:

## 2022-04-07 RX ORDER — LEVOTHYROXINE SODIUM 0.05 MG/1
TABLET ORAL
Qty: 90 TABLET | Refills: 1 | Status: SHIPPED | OUTPATIENT
Start: 2022-04-07 | End: 2022-10-10

## 2022-04-08 RX ORDER — BENAZEPRIL HYDROCHLORIDE 40 MG/1
TABLET, FILM COATED ORAL
Qty: 90 TABLET | Refills: 1 | Status: SHIPPED | OUTPATIENT
Start: 2022-04-08 | End: 2022-10-10

## 2022-04-11 ENCOUNTER — TELEPHONE (OUTPATIENT)
Dept: INTERNAL MEDICINE CLINIC | Age: 87
End: 2022-04-11

## 2022-04-11 NOTE — TELEPHONE ENCOUNTER
----- Message from Claude Terry sent at 4/11/2022 11:14 AM EDT -----  Subject: Refill Request    QUESTIONS  Name of Medication? benazepril (LOTENSIN) 40 MG tablet  Patient-reported dosage and instructions? 40 mg tab; Take one tablet by   mouth daily  How many days do you have left? 0  Preferred Pharmacy? [facility] #[id number]  Pharmacy phone number (if available)? [phone]  Additional Information for Provider? patient of Dr. Nu Menendez is   calling requesting refill on both meds stated above, please advise once   approved.  ---------------------------------------------------------------------------  --------------,  Name of Medication? levothyroxine (SYNTHROID) 50 MCG tablet  Patient-reported dosage and instructions? TAKE ONE TABLET BY MOUTH DAILY;   TAKE ONE TABLET BY MOUTH DAILY  How many days do you have left? 0  [facility]? [facility] #[id number]  Pharmacy phone number (if available)? [phone]  Additional Information for Provider? patient of Dr. Nu Menendez is   calling requesting refill on both meds stated above, please advise once   approved.  ---------------------------------------------------------------------------  --------------  CALL BACK INFO  What is the best way for the office to contact you? OK to leave message on   voicemail  Preferred Call Back Phone Number? [phone]  ---------------------------------------------------------------------------  --------------  SCRIPT ANSWERS  Relationship to Patient?  Self

## 2022-04-25 RX ORDER — MIRTAZAPINE 7.5 MG/1
TABLET, FILM COATED ORAL
Qty: 90 TABLET | Refills: 1 | Status: SHIPPED | OUTPATIENT
Start: 2022-04-25 | End: 2022-07-12 | Stop reason: SDUPTHER

## 2022-04-25 NOTE — TELEPHONE ENCOUNTER
Patient called to have the following medication refilled:      mirtazapine (REMERON) 7.5 MG tablet TAKE ONE TABLET BY MOUTH ONCE NIGHTLY         [facility] [address] [place] [address] [phone]        Last appointment: 10/29/2021  Next appointment: 4/29/2022  Last refill:

## 2022-04-27 PROBLEM — H26.9 CATARACT, RIGHT EYE: Status: RESOLVED | Noted: 2017-04-20 | Resolved: 2022-04-27

## 2022-04-27 NOTE — PROGRESS NOTES
Date of Visit:  2022    CC: Karina Chadwick (: 1935) is a 80 y.o. male, established patient, here for evaluation/re-evaluation of the following medical concerns:    ASSESSMENT/PLAN:  Essential hypertension  Assessment & Plan:  At goal.  Continue current antihypertensive regimen. Orders:  -     Comprehensive Metabolic Panel, Fasting; Future  Hypertensive kidney disease with stage 3b chronic kidney disease ([facility] [address]  Assessment & Plan:  Asymptomatic. He will continue to avoid NSAIDs and other nephrotoxins. Pure hypercholesterolemia  Assessment & Plan: Tolerating current dose(s) of Lipitor- continue. Orders:  -     Lipid, Fasting; Future  Hypothyroidism due to acquired atrophy of thyroid  Assessment & Plan:  Dry itchy skin and constipation are likely multifactorial, but will adjust levothyroxine dose if indicated by lab results. Orders:  -     TSH; Future  Primary insomnia  Assessment & Plan:  Doing well on current doses of trazodone and Remeron- continue. Anemia due to stage 3b chronic kidney disease ([facility] [address]  Assessment & Plan:  Asymptomatic. Suspect related to chronic renal insufficiency, but will check CBC and iron studies to ensure stability and to rule out GI blood loss. 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 Importance of daily foot checks and use of assistive devices to help prevent falls discussed. B12 deficiency  Assessment & Plan:  Check level on current dose of supplement. Orders:  -     Vitamin B12; Future  Current moderate episode of major depressive disorder without prior episode ([facility]  Assessment & Plan:  Symptoms wax and wane, depending on his interactions with family members.   Clarified that suicidal ideation occurring during times of conflict actually represents intrusive thoughts with no true intent or plan. He was strongly encouraged to pursue counseling to address his external stressors, which was helpful in the past.  Continue current medications, but if symptoms worsen, will refer to our office psychiatrist.   Vision impairment  Assessment & Plan:  Slow worsening- specialist at [facility] has nothing more to offer, so he will return to his general ophthalmologist to monitor eye pressures and other eye issues that may affect his general health. Return in about 6 months (around 10/29/2022) for MEDICARE AWV. Vitals:    04/29/22 1432 04/29/22 1508   BP: (!) 142/84 134/82   Pulse: 89    SpO2: 98%    Weight: 131 lb 3.2 oz (59.5 kg)       Estimated body mass index is 23.24 kg/m² as calculated from the following:    Height as of 10/29/21: 5' 3\" (1.6 m). Weight as of this encounter: 131 lb 3.2 oz (59.5 kg). Wt Readings from Last 3 Encounters:   04/29/22 131 lb 3.2 oz (59.5 kg)   10/29/21 126 lb (57.2 kg)   07/30/21 126 lb (57.2 kg)     BP Readings from Last 3 Encounters:   04/29/22 134/82   10/29/21 138/78   07/30/21 135/75     HPI  Hypertension/CKD:  Home blood pressure monitoring: No.  He is adherent to a low sodium diet. Patient denies chest pain, shortness of breath, headache, lightheadedness and palpitations.  Antihypertensive medication side effects: no medication side effects noted.  Use of agents associated with hypertension: none.  No NSAIDs.     Hyperlipidemia:  No new myalgias or GI upset current dose of atorvastatin (Lipitor).  Medication compliance: compliant all of the time. Patient is not following a low fat, low cholesterol diet.  No regular exercise.     Hypothyroidism: Recent symptoms: dry itchy skin, constipation.  He denies fatigue, weight gain, change in bowel habits, palpitations.  Patient is taking his medication consistently on an empty stomach.     Insomnia/Depression:  WHXRRGZ treatment: trazodone 100 mg qhs, Remeron 7.5 mg qhs, which has been effective.  Medication side effects: none. Current symptoms include intermittent anhedonia/diminished interest in activities, depressed mood, fatigue and feelings of worthlessness, occasional suicidal thoughts- related to conflict at home and visual loss- symptoms wax and wane. Patient denies impaired memory, change in appetite. Weight stable. Counseling- has not pursued yet, but willing. ROS  As documented above    Physical Exam  Constitutional:       General: He is not in acute distress. Appearance: He is well-developed. Eyes:      Conjunctiva/sclera: Conjunctivae normal.      Comments: Left eye artificial   Neck:      Thyroid: No thyroid mass or thyromegaly. Vascular: No carotid bruit or JVD. Cardiovascular:      Rate and Rhythm: Normal rate and regular rhythm. Heart sounds: Normal heart sounds. No murmur heard. No friction rub. No gallop. Pulmonary:      Effort: Pulmonary effort is normal. No respiratory distress. Breath sounds: Normal breath sounds. No wheezing, rhonchi or rales. Abdominal:      General: There is no distension. Palpations: Abdomen is soft. Tenderness: There is no abdominal tenderness. Musculoskeletal:      Cervical back: Neck supple. Right lower leg: No edema. Left lower leg: No edema. Lymphadenopathy:      Cervical: No cervical adenopathy. Skin:     General: Skin is warm and dry. Findings: No erythema or rash. Neurological:      Mental Status: He is alert and oriented to person, place, and time. Psychiatric:         Speech: Speech normal.         Behavior: Behavior normal.         Thought Content:  Thought content normal.         Judgment: Judgment normal.     On this date 4/29/2022 I have spent 50 minutes reviewing previous notes, test results and face to face with the patient discussing the diagnosis and importance of compliance with the treatment plan as well as documenting on the day of the visit. --Ronn Caban MD on 4/29/2022 at 5:18 PM    An electronic signature was used to authenticate this note.

## 2022-04-29 ENCOUNTER — OFFICE VISIT (OUTPATIENT)
Dept: INTERNAL MEDICINE CLINIC | Age: 87
End: 2022-04-29
Payer: MEDICARE

## 2022-04-29 ENCOUNTER — TELEPHONE (OUTPATIENT)
Dept: INTERNAL MEDICINE CLINIC | Age: 87
End: 2022-04-29

## 2022-04-29 VITALS
DIASTOLIC BLOOD PRESSURE: 82 MMHG | OXYGEN SATURATION: 98 % | HEART RATE: 89 BPM | WEIGHT: 131.2 LBS | SYSTOLIC BLOOD PRESSURE: 134 MMHG | BODY MASS INDEX: 23.24 KG/M2

## 2022-04-29 DIAGNOSIS — N18.32 HYPERTENSIVE KIDNEY DISEASE WITH STAGE 3B CHRONIC KIDNEY DISEASE (HCC): ICD-10-CM

## 2022-04-29 DIAGNOSIS — D63.1 ANEMIA DUE TO STAGE 3B CHRONIC KIDNEY DISEASE (HCC): ICD-10-CM

## 2022-04-29 DIAGNOSIS — R20.2 NUMBNESS AND TINGLING: ICD-10-CM

## 2022-04-29 DIAGNOSIS — N18.32 ANEMIA DUE TO STAGE 3B CHRONIC KIDNEY DISEASE (HCC): ICD-10-CM

## 2022-04-29 DIAGNOSIS — R20.0 NUMBNESS AND TINGLING: ICD-10-CM

## 2022-04-29 DIAGNOSIS — F51.01 PRIMARY INSOMNIA: ICD-10-CM

## 2022-04-29 DIAGNOSIS — E78.00 PURE HYPERCHOLESTEROLEMIA: ICD-10-CM

## 2022-04-29 DIAGNOSIS — E53.8 B12 DEFICIENCY: ICD-10-CM

## 2022-04-29 DIAGNOSIS — H54.7 VISION IMPAIRMENT: ICD-10-CM

## 2022-04-29 DIAGNOSIS — F32.1 CURRENT MODERATE EPISODE OF MAJOR DEPRESSIVE DISORDER WITHOUT PRIOR EPISODE (HCC): ICD-10-CM

## 2022-04-29 DIAGNOSIS — I10 ESSENTIAL HYPERTENSION: Primary | ICD-10-CM

## 2022-04-29 DIAGNOSIS — E03.4 HYPOTHYROIDISM DUE TO ACQUIRED ATROPHY OF THYROID: ICD-10-CM

## 2022-04-29 DIAGNOSIS — I12.9 HYPERTENSIVE KIDNEY DISEASE WITH STAGE 3B CHRONIC KIDNEY DISEASE (HCC): ICD-10-CM

## 2022-04-29 PROBLEM — F43.21 GRIEF REACTION: Status: RESOLVED | Noted: 2020-09-09 | Resolved: 2022-04-29

## 2022-04-29 PROCEDURE — 99215 OFFICE O/P EST HI 40 MIN: CPT | Performed by: INTERNAL MEDICINE

## 2022-04-29 PROCEDURE — 1036F TOBACCO NON-USER: CPT | Performed by: INTERNAL MEDICINE

## 2022-04-29 PROCEDURE — G8420 CALC BMI NORM PARAMETERS: HCPCS | Performed by: INTERNAL MEDICINE

## 2022-04-29 PROCEDURE — G8427 DOCREV CUR MEDS BY ELIG CLIN: HCPCS | Performed by: INTERNAL MEDICINE

## 2022-04-29 PROCEDURE — 1123F ACP DISCUSS/DSCN MKR DOCD: CPT | Performed by: INTERNAL MEDICINE

## 2022-04-29 PROCEDURE — 4040F PNEUMOC VAC/ADMIN/RCVD: CPT | Performed by: INTERNAL MEDICINE

## 2022-04-29 NOTE — PATIENT INSTRUCTIONS
o Boy MAYFIELD Eldionisioda Factor  i. Baptism Psychologist  ii. Private insurance and medicare  iii. [address] [facility], 103 [facility]  iv. [phone]  v. [facility]es    o [employer]  i. Accepts many insurances and also offers self-pay discounts  ii. Many different locations across [place] and [place]  iii. [phone]  iv. www.[email or web address] net    o [employer]  i. 100 [facility], [address]  ii. [phone]  iii. www.[email or web address]    o [facility]  i. Appears to have a [facility] affiliation  ii. [address] DESHA[address], [address]  iii. [facility] location as well  iv. [phone]  v. [facility]. Keagan Alcantar, Ph.D.  i. Eliverto Mcardle  ii. [address], [address] [address]  iii. [address] [place] Accepts many private insurances and Medicare  ii. [address]  iii. [phone] ext. [phone] [place] [address] [place]  i. Accepts Medicare  ii. [address]  iii. [address] [facility] 151 Accepts Medicare   ii. [facility], [address] [place] [address]  iii. 1111 [facility] Accepts Medicare and other private insurances  ii. [address], [address]  iii. [phone]  iv. [email or web address].ayaz.[email or web address] en.html    o Eliseo Cruz  i. Accepts medicare and many private insurances  ii. 271 [facility], [address] [place] [address], [address] [address]  iii.  [phone]

## 2022-04-29 NOTE — TELEPHONE ENCOUNTER
Patient's child Kristine Chavez is requesting to speak with Dr. Nirmala Nunn prior to patient's appointment today regarding concerns she has. She is unable to come with him to his appointment. She prefers to disclose this with Dr. Nirmala Nunn only.

## 2022-04-29 NOTE — ASSESSMENT & PLAN NOTE
Slow worsening- specialist at [facility] has nothing more to offer, so he will return to his general ophthalmologist to monitor eye pressures and other eye issues that may affect his general health.

## 2022-04-29 NOTE — TELEPHONE ENCOUNTER
Spoke with daughter, Carlos Grover- concerned that he is not taking his medications properly and will not allow family members to help him. Will address at his visit today.

## 2022-04-29 NOTE — ASSESSMENT & PLAN NOTE
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 Importance of daily foot checks and use of assistive devices to help prevent falls discussed.

## 2022-04-29 NOTE — TELEPHONE ENCOUNTER
Spoke with Osmar Swenson advised her that unfortunately she is not on patients HIPAA so Dr Miracle Edge talk to her but she can listen to what Osmar Swenson has to say. Osmar Swenson understood.

## 2022-04-29 NOTE — ASSESSMENT & PLAN NOTE
Symptoms wax and wane, depending on his interactions with family members. Clarified that suicidal ideation occurring during times of conflict actually represents intrusive thoughts with no true intent or plan.   He was strongly encouraged to pursue counseling to address his external stressors, which was helpful in the past.  Continue current medications, but if symptoms worsen, will refer to our office psychiatrist.

## 2022-05-16 RX ORDER — ATORVASTATIN CALCIUM 20 MG/1
20 TABLET, FILM COATED ORAL DAILY
Qty: 90 TABLET | Refills: 1 | Status: SHIPPED | OUTPATIENT
Start: 2022-05-16

## 2022-06-18 NOTE — CARE COORDINATION
Ambulatory Care Coordination Note  3/10/2020  CM Risk Score: 1  Charlson 10 Year Mortality Risk Score: 47%     ACC: Rachel Underwood, RN    Summary Note:   Referral from PCP at [address] today  RAEV 3 %  Familiar w patient from past encounters   patient is legally blind, he and family ( other than wife, Son Almaguer) are encouraging him to look in to a seeing eye dog  Patient still active  Dependent on wife driving     He is interested in filling out DNR form, meeting w psychologist ( he and wife Son Almaguer ) to discuss getting a seeing eye dog -- feels that then concerns/ feelings could be honestly presented and solutions discussed. Long discussion w patient re best approach to take   agrred be best to call wife explain desire to look in to seeing eye dogs and desire to schedule w DR Hanna Tejeda so everyone can jaime about legitimate concerns of how this would go.  will also require patient calling and starting process w  Dogs in [address] [place] [address] introduced  to wife in [facility] of practice today, provided ACM card and stated that I would be calling her later. Plan  ACM to discuss plan w wife, family if needed  Schedule w DR Hanna Tejeda- appt convenient for wife as she does driving   Transportation options - through [employer]? Seeing eye dog- explore options w  Dogs- organization in [address] w onsite training. Need to talk w wife, Fauzia Anaya- she has been reluctant due to need for her to care for the dog. DNR Form to be completed by patient    Will mail out information on  Dogs and agency in [place] along w DNR forms to look over and sign            Prior to Admission medications    Medication Sig Start Date End Date Taking?  Authorizing Provider   traZODone (DESYREL) 100 MG tablet TAKE 1 TABLET BY MOUTH EVERY NIGHT 3/10/20   Jaymie Donovan MD   mirtazapine (REMERON) 7.5 MG tablet Take 1 tablet by mouth nightly 3/10/20   Jaymie Donovan MD   benazepril (LOTENSIN) 40 MG tablet TAKE 1 TABLET BY MOUTH EVERY DAY 2/3/20   Jaymie Donovan COVID swab collected. Testing in progress.    MD   atorvastatin (LIPITOR) 20 MG tablet TAKE 1 TABLET BY MOUTH EVERY DAY 1/27/20   Danika Ocampo MD   levothyroxine (SYNTHROID) 50 MCG tablet TAKE 1 TABLET BY MOUTH EVERY DAILY 12/30/19   Danika Ocampo MD   Multiple Vitamins-Minerals (ICAPS AREDS 2 PO) Take by mouth    Historical Provider, MD   Lutein-Zeaxanthin (OCUVITE LUTEIN 25 PO) Take by mouth    Historical Provider, MD   docusate sodium (COLACE) 100 MG capsule Take 100 mg by mouth 2 times daily    Historical Provider, MD   vitamin B-12 (CYANOCOBALAMIN) 1000 MCG tablet Take 1,000 mcg by mouth daily    Historical Provider, MD   latanoprost (XALATAN) 0.005 % ophthalmic solution 1 drop nightly    Historical Provider, MD   Brimonidine Tartrate-Timolol (COMBIGAN OP) Apply to eye    Historical Provider, MD   Brinzolamide (AZOPT OP) Apply to eye    Historical Provider, MD       Future Appointments   Date Time Provider Akash Sheth   6/9/2020  2:30 PM Danika Ocampo MD [facility]

## 2022-07-05 ENCOUNTER — TELEPHONE (OUTPATIENT)
Dept: INTERNAL MEDICINE CLINIC | Age: 87
End: 2022-07-05

## 2022-07-05 NOTE — TELEPHONE ENCOUNTER
We will have a new therapist in our office in September. If they need someone sooner, here are some suggestions:    a. Medicare  o Boy Rdz  i. Sudarshan Psychologist  ii. Private insurance and medicare  iii. [address] [facility], 103 [facility]  iv. [phone]  v. [facility]es    o [employer]  i. Accepts many insurances and also offers self-pay discounts  ii. Many different locations across [place] and [place]  iii. [phone]  iv. www.[email or web address] net    o [employer]  i. 100 [facility], [address]  ii. [phone]  iii. [email or web address]    o [facility]  i. Appears to have a [facility] affiliation  ii. [place]  [address], [address]  iii. [facility] location as well  iv. [phone]  v. [facility]. Hakan Phelps, Ph.D.  i. [facility]  ii. [address], [address] [facility] [address] [address]  iii. [address] [place] Accepts many private insurances and Medicare  ii. [address]  iii. [phone] ext. [phone] [facility]  i. Accepts Medicare  ii. [address]  iii. [address] [facility] 151 Accepts Medicare   ii. [facility], [address] [place] [address]  iii. 1111 [facility] Accepts Medicare and other private insurances  ii. 66 Cathi [address]dwell [address], [address]  iii. [phone]  iv. [email or web address].[email or web address] en.html    o Eliseo Cruz  i. Accepts medicare and many private insurances  ii. 271 [facility], [address] [place] [address], [address] [facility] [address] [address]  iii.  [phone]

## 2022-07-05 NOTE — TELEPHONE ENCOUNTER
Patient is requesting a referral to see a \"mental therapist due to getting in fights with his granddaughter who lives with him. \"    Patient provided no additional information. Please advise. Please contact patient @ phone # provided.

## 2022-07-12 RX ORDER — MIRTAZAPINE 7.5 MG/1
TABLET, FILM COATED ORAL
Qty: 90 TABLET | Refills: 1 | Status: SHIPPED | OUTPATIENT
Start: 2022-07-12

## 2022-07-12 NOTE — TELEPHONE ENCOUNTER
Patient is requesting the following prescription(s):  Last appointment: 4/29/2022  Next appointment: 10/28/2022  Last refill: 04-      mirtazapine (REMERON) 7.5 MG tablet TAKE ONE TABLET BY MOUTH ONCE NIGHTLY   #90    [facility] [address] [place] [address] [address] - P [phone] - F [phone]     Patient made aware to allow 24 to 48 business hours for prescription refills. Patient can be reached @ phone # provided should there be any questions.

## 2022-07-19 NOTE — ASSESSMENT & PLAN NOTE
Asymptomatic. He will continue to avoid NSAIDs and other nephrotoxins. Eye Clamp Note Details: An eye clamp was used during the procedure.

## 2022-10-10 RX ORDER — LEVOTHYROXINE SODIUM 0.05 MG/1
TABLET ORAL
Qty: 90 TABLET | Refills: 0 | Status: SHIPPED | OUTPATIENT
Start: 2022-10-10

## 2022-10-10 RX ORDER — BENAZEPRIL HYDROCHLORIDE 40 MG/1
TABLET, FILM COATED ORAL
Qty: 90 TABLET | Refills: 0 | Status: SHIPPED | OUTPATIENT
Start: 2022-10-10

## 2022-10-28 ENCOUNTER — TELEPHONE (OUTPATIENT)
Dept: INTERNAL MEDICINE CLINIC | Age: 87
End: 2022-10-28

## 2022-10-28 ENCOUNTER — OFFICE VISIT (OUTPATIENT)
Dept: INTERNAL MEDICINE CLINIC | Age: 87
End: 2022-10-28
Payer: MEDICARE

## 2022-10-28 VITALS
OXYGEN SATURATION: 98 % | HEART RATE: 65 BPM | SYSTOLIC BLOOD PRESSURE: 135 MMHG | BODY MASS INDEX: 21.78 KG/M2 | HEIGHT: 64 IN | DIASTOLIC BLOOD PRESSURE: 70 MMHG | WEIGHT: 127.6 LBS

## 2022-10-28 DIAGNOSIS — Z00.00 MEDICARE ANNUAL WELLNESS VISIT, SUBSEQUENT: Primary | ICD-10-CM

## 2022-10-28 DIAGNOSIS — N18.32 HYPERTENSIVE KIDNEY DISEASE WITH STAGE 3B CHRONIC KIDNEY DISEASE (HCC): ICD-10-CM

## 2022-10-28 DIAGNOSIS — F51.01 PRIMARY INSOMNIA: ICD-10-CM

## 2022-10-28 DIAGNOSIS — I10 ESSENTIAL HYPERTENSION: ICD-10-CM

## 2022-10-28 DIAGNOSIS — I12.9 HYPERTENSIVE KIDNEY DISEASE WITH STAGE 3B CHRONIC KIDNEY DISEASE (HCC): ICD-10-CM

## 2022-10-28 DIAGNOSIS — H54.7 VISION IMPAIRMENT: ICD-10-CM

## 2022-10-28 DIAGNOSIS — F32.2 CURRENT SEVERE EPISODE OF MAJOR DEPRESSIVE DISORDER WITHOUT PSYCHOTIC FEATURES WITHOUT PRIOR EPISODE (HCC): ICD-10-CM

## 2022-10-28 DIAGNOSIS — E78.00 PURE HYPERCHOLESTEROLEMIA: ICD-10-CM

## 2022-10-28 DIAGNOSIS — G47.62 NOCTURNAL LEG CRAMPS: ICD-10-CM

## 2022-10-28 PROCEDURE — 90694 VACC AIIV4 NO PRSRV 0.5ML IM: CPT | Performed by: INTERNAL MEDICINE

## 2022-10-28 PROCEDURE — G8420 CALC BMI NORM PARAMETERS: HCPCS | Performed by: INTERNAL MEDICINE

## 2022-10-28 PROCEDURE — G0008 ADMIN INFLUENZA VIRUS VAC: HCPCS | Performed by: INTERNAL MEDICINE

## 2022-10-28 PROCEDURE — 99214 OFFICE O/P EST MOD 30 MIN: CPT | Performed by: INTERNAL MEDICINE

## 2022-10-28 PROCEDURE — 1036F TOBACCO NON-USER: CPT | Performed by: INTERNAL MEDICINE

## 2022-10-28 PROCEDURE — G8484 FLU IMMUNIZE NO ADMIN: HCPCS | Performed by: INTERNAL MEDICINE

## 2022-10-28 PROCEDURE — 1123F ACP DISCUSS/DSCN MKR DOCD: CPT | Performed by: INTERNAL MEDICINE

## 2022-10-28 PROCEDURE — G0439 PPPS, SUBSEQ VISIT: HCPCS | Performed by: INTERNAL MEDICINE

## 2022-10-28 PROCEDURE — G8427 DOCREV CUR MEDS BY ELIG CLIN: HCPCS | Performed by: INTERNAL MEDICINE

## 2022-10-28 SDOH — ECONOMIC STABILITY: FOOD INSECURITY: WITHIN THE PAST 12 MONTHS, THE FOOD YOU BOUGHT JUST DIDN'T LAST AND YOU DIDN'T HAVE MONEY TO GET MORE.: NEVER TRUE

## 2022-10-28 SDOH — ECONOMIC STABILITY: FOOD INSECURITY: WITHIN THE PAST 12 MONTHS, YOU WORRIED THAT YOUR FOOD WOULD RUN OUT BEFORE YOU GOT MONEY TO BUY MORE.: NEVER TRUE

## 2022-10-28 ASSESSMENT — COLUMBIA-SUICIDE SEVERITY RATING SCALE - C-SSRS
2. HAVE YOU ACTUALLY HAD ANY THOUGHTS OF KILLING YOURSELF?: YES
6. HAVE YOU EVER DONE ANYTHING, STARTED TO DO ANYTHING, OR PREPARED TO DO ANYTHING TO END YOUR LIFE?: NO
1. WITHIN THE PAST MONTH, HAVE YOU WISHED YOU WERE DEAD OR WISHED YOU COULD GO TO SLEEP AND NOT WAKE UP?: YES
4. HAVE YOU HAD THESE THOUGHTS AND HAD SOME INTENTION OF ACTING ON THEM?: YES
5. HAVE YOU STARTED TO WORK OUT OR WORKED OUT THE DETAILS OF HOW TO KILL YOURSELF? DO YOU INTEND TO CARRY OUT THIS PLAN?: NO
3. HAVE YOU BEEN THINKING ABOUT HOW YOU MIGHT KILL YOURSELF?: YES

## 2022-10-28 ASSESSMENT — PATIENT HEALTH QUESTIONNAIRE - PHQ9
10. IF YOU CHECKED OFF ANY PROBLEMS, HOW DIFFICULT HAVE THESE PROBLEMS MADE IT FOR YOU TO DO YOUR WORK, TAKE CARE OF THINGS AT HOME, OR GET ALONG WITH OTHER PEOPLE: 1
8. MOVING OR SPEAKING SO SLOWLY THAT OTHER PEOPLE COULD HAVE NOTICED. OR THE OPPOSITE, BEING SO FIGETY OR RESTLESS THAT YOU HAVE BEEN MOVING AROUND A LOT MORE THAN USUAL: 0
4. FEELING TIRED OR HAVING LITTLE ENERGY: 3
SUM OF ALL RESPONSES TO PHQ QUESTIONS 1-9: 9
2. FEELING DOWN, DEPRESSED OR HOPELESS: 0
3. TROUBLE FALLING OR STAYING ASLEEP: 0
7. TROUBLE CONCENTRATING ON THINGS, SUCH AS READING THE NEWSPAPER OR WATCHING TELEVISION: 0
SUM OF ALL RESPONSES TO PHQ QUESTIONS 1-9: 0
2. FEELING DOWN, DEPRESSED OR HOPELESS: 0
5. POOR APPETITE OR OVEREATING: 0
SUM OF ALL RESPONSES TO PHQ QUESTIONS 1-9: 9
1. LITTLE INTEREST OR PLEASURE IN DOING THINGS: 0
9. THOUGHTS THAT YOU WOULD BE BETTER OFF DEAD, OR OF HURTING YOURSELF: 3
SUM OF ALL RESPONSES TO PHQ QUESTIONS 1-9: 9
SUM OF ALL RESPONSES TO PHQ QUESTIONS 1-9: 6
6. FEELING BAD ABOUT YOURSELF - OR THAT YOU ARE A FAILURE OR HAVE LET YOURSELF OR YOUR FAMILY DOWN: 3
SUM OF ALL RESPONSES TO PHQ9 QUESTIONS 1 & 2: 0
SUM OF ALL RESPONSES TO PHQ QUESTIONS 1-9: 0

## 2022-10-28 ASSESSMENT — SOCIAL DETERMINANTS OF HEALTH (SDOH): HOW HARD IS IT FOR YOU TO PAY FOR THE VERY BASICS LIKE FOOD, HOUSING, MEDICAL CARE, AND HEATING?: NOT HARD AT ALL

## 2022-10-28 ASSESSMENT — LIFESTYLE VARIABLES
HOW OFTEN DO YOU HAVE A DRINK CONTAINING ALCOHOL: NEVER
HOW MANY STANDARD DRINKS CONTAINING ALCOHOL DO YOU HAVE ON A TYPICAL DAY: PATIENT DOES NOT DRINK

## 2022-10-28 NOTE — ASSESSMENT & PLAN NOTE
When questioned in detail about his symptoms, he stated that he thinks about suicide and how he would accomplish this, but more of an intrusive thought than active suicidal ideation. He said he has actually felt better over the past week, since he is getting used to his new apartment. [facility], and would also benefit from psychiatry referral, which was provided. He expressed willingness to see mental health providers in our office, although he did not follow up on external counseling referral 4/22. Daughter visits him every day and will keep a close eye on him until evaluated by [facility].   She will call 911 for any active SI.

## 2022-10-28 NOTE — ASSESSMENT & PLAN NOTE
Slow worsening, which is the primary factor affecting his mental health- specialist at [facility] has nothing more to offer, so he will return to his general ophthalmologist to monitor eye pressures and other eye issues that may affect his general health.

## 2022-10-28 NOTE — PROGRESS NOTES
Medicare Annual Wellness Visit    Armando Cat is here for Medicare AWV and Loss of Vision    Assessment & Plan   Medicare annual wellness visit, subsequent  Current severe episode of major depressive disorder without psychotic features without prior episode [facility])  Assessment & Plan:  When questioned in detail about his symptoms, he stated that he thinks about suicide and how he would accomplish this, but more of an intrusive thought than active suicidal ideation. He said he has actually felt better over the past week, since he is getting used to his new apartment. Needs urgent safety evaluation with [facility], [place], and would also benefit from psychiatry referral, which was provided. He expressed willingness to see mental health providers in our office, although he did not follow up on external counseling referral 4/22. Daughter visits him every day and will keep a close eye on him until evaluated by [facility] [place]. She will call 911 for any active SI. Orders:  -     Ambulatory referral to Psychiatry  Vision impairment  Assessment & Plan:  Slow worsening, which is the primary factor affecting his mental health- specialist at [facility] has nothing more to offer, so he will return to his general ophthalmologist to monitor eye pressures and other eye issues that may affect his general health. Primary insomnia  Assessment & Plan:  Patient satisfied with his sleep on current doses of trazodone and Remeron- continue. Essential hypertension  Assessment & Plan:  First office BP sub-optimal, but second acceptable. Continue current dose of Lotensin. Hypertensive kidney disease with stage 3b chronic kidney disease ([facility] [address]  Assessment & Plan:  Asymptomatic. He will continue to avoid NSAIDs and other nephrotoxins. Pure hypercholesterolemia  Assessment & Plan: Tolerating current dose(s) of Lipitor- continue.     Nocturnal leg cramps  Assessment & Plan:  Recommended increased fluid intake, warm bath at night and stretching exercises before bed.  He was reminded to have his labs drawn, so that electrolytes can be assessed. Recommendations for Preventive Services Due: see orders and patient instructions/AVS.  Recommended screening schedule for the next 5-10 years is provided to the patient in written form: see Patient Instructions/AVS.    45 minutes spent on non-preventive portion of this visit, including 15 minute discussion with daughter at patient's request and messaging with [facility]. Return in about 1 month (around 11/28/2022) for 30 MIN F/U. Subjective       Patient's complete Health Risk Assessment and screening values have been reviewed and are found in Flowsheets. The following problems were reviewed today and where indicated follow up appointments were made and/or referrals ordered.     Positive Risk Factor Screenings with Interventions:    Fall Risk:  Do you feel unsteady or are you worried about falling? : (!) yes  2 or more falls in past year?: no  Fall with injury in past year?: no   Fall Risk Interventions:    Has moved to independent living facility and tips for falls prevention provided     Depression:  PHQ-2 Score: 0  PHQ-9 Total Score: 9    Severity:1-4 = minimal depression, 5-9 = mild depression, 10-14 = moderate depression, 15-19 = moderately severe depression, 20-27 = severe depression  Depression Interventions:  See A/P          General Health and ACP:  General  In general, how would you say your health is?: Fair  In the past 7 days, have you experienced any of the following: New or Increased Pain, New or Increased Fatigue, Loneliness, Social Isolation, Stress or Anger?: (!) Yes  Select all that apply: (!) Stress, Loneliness  Do you get the social and emotional support that you need?: Yes  Do you have a Living Will?: Yes    Advance Directives       Power of  Living Will ACP-Advance Directive ACP-Power of     Not on File Not on File Fiatt        General Health Risk Interventions:  Loneliness: See A/P  Stress: See A/P    Health Habits/Nutrition:  Physical Activity: Inactive    Days of Exercise per Week: 0 days    Minutes of Exercise per Session: 0 min     Have you lost any weight without trying in the past 3 months?: No  Body mass index: 22.25  Have you seen the dentist within the past year?: (!) No  Health Habits/Nutrition Interventions:  Dental exam overdue:  patient encouraged to make appointment with his/her dentist    Hearing/Vision:  Do you or your family notice any trouble with your hearing that hasn't been managed with hearing aids?: No  Do you have difficulty driving, watching TV, or doing any of your daily activities because of your eyesight?: (!) Yes  Have you had an eye exam within the past year?: Yes  No results found. Hearing/Vision Interventions:  Vision concerns:  legally blind     ADLs:  In the past 7 days, did you need help from others to perform any of the following everyday activities: Eating, dressing, grooming, bathing, toileting, or walking/balance?: (!) Yes (walking/balance)  Select all that apply: (!) Walking/Balance  In the past 7 days, did you need help from others to take care of any of the following: Laundry, housekeeping, banking/finances, shopping, telephone use, food preparation, transportation, or taking medications?: (!) Yes  Select all that apply: [employer], Housekeeping, Transportation, Shopping, Telephone Use  ADL Interventions:  Has adequate resources per family          Objective   Vitals:    10/28/22 1415 10/28/22 1502   BP: (!) 148/78 135/70   Pulse: 65    SpO2: 98%    Weight: 127 lb 9.6 oz (57.9 kg)    Height: 5' 3.5\" (1.613 m)       Body mass index is 22.25 kg/m². Physical Exam  Constitutional:       General: He is not in acute distress. Appearance: He is well-developed. Eyes:      Conjunctiva/sclera: Conjunctivae normal.      Comments: Left eye artificial   Neck:      Thyroid: No thyroid mass or thyromegaly. Vascular: No carotid bruit or JVD. Cardiovascular:      Rate and Rhythm: Normal rate and regular rhythm. Heart sounds: Normal heart sounds. No murmur heard. No friction rub. No gallop. Pulmonary:      Effort: Pulmonary effort is normal. No respiratory distress. Breath sounds: Normal breath sounds. No wheezing, rhonchi or rales. Abdominal:      General: There is no distension. Palpations: Abdomen is soft. Tenderness: There is no abdominal tenderness. Musculoskeletal:      Cervical back: Neck supple. Right lower leg: No edema. Left lower leg: No edema. Lymphadenopathy:      Cervical: No cervical adenopathy. Skin:     General: Skin is warm and dry. Findings: No erythema or rash. Neurological:      Mental Status: He is alert and oriented to person, place, and time. Psychiatric:         Speech: Speech normal.         Behavior: Behavior normal.         Thought Content: Thought content normal.         Judgment: Judgment normal.          No Known Allergies  Prior to Visit Medications    Medication Sig Taking? Authorizing Provider   levothyroxine (SYNTHROID) 50 MCG tablet TAKE 1 TABLET BY MOUTH DAILY Yes Jina Pang DO   mirtazapine (REMERON) 7.5 MG tablet TAKE ONE TABLET BY MOUTH ONCE NIGHTLY Yes Karsten Mittal MD   atorvastatin (LIPITOR) 20 MG tablet Take 1 tablet by mouth daily Yes Karsten iMttal MD   traZODone (DESYREL) 100 MG tablet TAKE ONE TABLET BY MOUTH EVERY NIGHT AT BEDTIME Yes Karsten Mittal MD   guaiFENesin (MUCINEX) 600 MG extended release tablet Take 2 tablets by mouth 2 times daily as needed for Congestion Yes Karsten Mittal MD   polyethylene glycol (MIRALAX) 17 GM/SCOOP powder Take 1 capful orally daily.  Yes Karsten Mittal MD   Multiple Vitamins-Minerals (ICAPS AREDS 2 PO) Take by mouth  Yes Historical Provider, MD   Lutein-Zeaxanthin (OCUVITE LUTEIN 25 PO) Take by mouth Yes Historical Provider, MD   docusate sodium (COLACE) 100 MG capsule Take 100 mg by mouth 2 times daily Yes Historical Provider, MD   vitamin B-12 (CYANOCOBALAMIN) 1000 MCG tablet Take 1,000 mcg by mouth daily Yes Historical Provider, MD   Brimonidine Tartrate-Timolol (COMBIGAN OP) Apply to eye Yes Historical Provider, MD   benazepril (LOTENSIN) 40 MG tablet TAKE 1 TABLET BY MOUTH DAILY  DO Rach MoyTeam (Including outside providers/suppliers regularly involved in providing care):   Patient Care Team:  Kevin Martinez MD as PCP - General (Internal Medicine)  Kevin Martinez MD as PCP - REHABILITATION [facility] Empaneled Provider  Alexandru Nash MD as Consulting Physician (Gastroenterology)  Ayaka Purdy MD as Consulting Physician (Urology)  Av Grossman MD as Consulting Physician (Ophthalmology)  Xander Haley MD as Consulting Physician (Sleep Medicine Family Practice)  Xander Haley MD as Consulting Physician (Sleep Medicine Family Practice)  Foreign Akins PSYD (Psychology)  Charles Pappas MD as Consulting Physician (Gastroenterology)     Reviewed and updated this visit:  Tobacco  Allergies  Meds  Problems  Med Hx  Surg Hx  Soc Hx  Fam Hx

## 2022-10-28 NOTE — ASSESSMENT & PLAN NOTE
Recommended increased fluid intake, warm bath at night and stretching exercises before bed. He was reminded to have his labs drawn, so that electrolytes can be assessed.

## 2022-10-28 NOTE — TELEPHONE ENCOUNTER
Dr. Palma Holter is needing to have this patient scheduled with you ASAP. Next appointment not until 11/29. Please see 's notes and contact Dr. Palma Holter to discuss having him seen ASAP.

## 2022-10-28 NOTE — Clinical Note
Klaus Jenkins,  This patient had a high risk suicide screen today in the office, but he has good supervision and said that he is actually starting to feel better over the past week. We tried to put him on your schedule next week for a safety evaluation, but you had nothing available until the end of November. Do you have any availability for urgent issues?   If not, what do you suggest?  Thanks, Maxwell Monzon

## 2022-10-28 NOTE — PATIENT INSTRUCTIONS
Personalized Preventive Plan for Carolyn Vuong - 10/28/2022  Need COVID booster per pharmacy  Appts with Dr. Malena Castle (psychologist) and Dr. Nisha Asif (psychiatrist)  Need to have fasting labs drawn as soon as possible    Medicare offers a range of preventive health benefits. Some of the tests and screenings are paid in full while other may be subject to a deductible, co-insurance, and/or copay. Some of these benefits include a comprehensive review of your medical history including lifestyle, illnesses that may run in your family, and various assessments and screenings as appropriate. After reviewing your medical record and screening and assessments performed today your provider may have ordered immunizations, labs, imaging, and/or referrals for you. A list of these orders (if applicable) as well as your Preventive Care list are included within your After Visit Summary for your review. Other Preventive Recommendations:    A preventive eye exam performed by an eye specialist is recommended every 1-2 years to screen for glaucoma; cataracts, macular degeneration, and other eye disorders. A preventive dental visit is recommended every 6 months. Try to get at least 150 minutes of exercise per week or 10,000 steps per day on a pedometer . Order or download the FREE \"Exercise & Physical Activity: Your Everyday Guide\" from [employer]. Call 4-933.890.4083 or search [employer] online. You need 4405-9892 mg of calcium and 0340-3828 IU of vitamin D per day. It is possible to meet your calcium requirement with diet alone, but a vitamin D supplement is usually necessary to meet this goal.  When exposed to the sun, use a sunscreen that protects against both UVA and UVB radiation with an SPF of 30 or greater. Reapply every 2 to 3 hours or after sweating, drying off with a towel, or swimming. Always wear a seat belt when traveling in a car.  Always wear a helmet when riding a bicycle or motorcycle.

## 2022-11-01 ENCOUNTER — OFFICE VISIT (OUTPATIENT)
Dept: PSYCHOLOGY | Age: 87
End: 2022-11-01
Payer: MEDICARE

## 2022-11-01 DIAGNOSIS — R45.851 SUICIDAL IDEATION: Primary | ICD-10-CM

## 2022-11-01 DIAGNOSIS — F41.9 ANXIETY: ICD-10-CM

## 2022-11-01 DIAGNOSIS — F32.A DEPRESSION, UNSPECIFIED DEPRESSION TYPE: ICD-10-CM

## 2022-11-01 PROCEDURE — 90791 PSYCH DIAGNOSTIC EVALUATION: CPT

## 2022-11-01 PROCEDURE — 1123F ACP DISCUSS/DSCN MKR DOCD: CPT

## 2022-11-01 PROCEDURE — 1036F TOBACCO NON-USER: CPT

## 2022-11-01 ASSESSMENT — COLUMBIA-SUICIDE SEVERITY RATING SCALE - C-SSRS
6. HAVE YOU EVER DONE ANYTHING, STARTED TO DO ANYTHING, OR PREPARED TO DO ANYTHING TO END YOUR LIFE?: NO
2. HAVE YOU ACTUALLY HAD ANY THOUGHTS OF KILLING YOURSELF?: NO
1. WITHIN THE PAST MONTH, HAVE YOU WISHED YOU WERE DEAD OR WISHED YOU COULD GO TO SLEEP AND NOT WAKE UP?: YES

## 2022-11-01 ASSESSMENT — PATIENT HEALTH QUESTIONNAIRE - PHQ9
6. FEELING BAD ABOUT YOURSELF - OR THAT YOU ARE A FAILURE OR HAVE LET YOURSELF OR YOUR FAMILY DOWN: 0
SUM OF ALL RESPONSES TO PHQ QUESTIONS 1-9: 8
3. TROUBLE FALLING OR STAYING ASLEEP: 1
2. FEELING DOWN, DEPRESSED OR HOPELESS: 2
SUM OF ALL RESPONSES TO PHQ QUESTIONS 1-9: 8
9. THOUGHTS THAT YOU WOULD BE BETTER OFF DEAD, OR OF HURTING YOURSELF: 3
8. MOVING OR SPEAKING SO SLOWLY THAT OTHER PEOPLE COULD HAVE NOTICED. OR THE OPPOSITE, BEING SO FIGETY OR RESTLESS THAT YOU HAVE BEEN MOVING AROUND A LOT MORE THAN USUAL: 0
5. POOR APPETITE OR OVEREATING: 0
4. FEELING TIRED OR HAVING LITTLE ENERGY: 0
1. LITTLE INTEREST OR PLEASURE IN DOING THINGS: 2
SUM OF ALL RESPONSES TO PHQ QUESTIONS 1-9: 5
10. IF YOU CHECKED OFF ANY PROBLEMS, HOW DIFFICULT HAVE THESE PROBLEMS MADE IT FOR YOU TO DO YOUR WORK, TAKE CARE OF THINGS AT HOME, OR GET ALONG WITH OTHER PEOPLE: 0
SUM OF ALL RESPONSES TO PHQ QUESTIONS 1-9: 8
SUM OF ALL RESPONSES TO PHQ9 QUESTIONS 1 & 2: 4
7. TROUBLE CONCENTRATING ON THINGS, SUCH AS READING THE NEWSPAPER OR WATCHING TELEVISION: 0

## 2022-11-01 ASSESSMENT — ANXIETY QUESTIONNAIRES
6. BECOMING EASILY ANNOYED OR IRRITABLE: 2
GAD7 TOTAL SCORE: 14
4. TROUBLE RELAXING: 2
IF YOU CHECKED OFF ANY PROBLEMS ON THIS QUESTIONNAIRE, HOW DIFFICULT HAVE THESE PROBLEMS MADE IT FOR YOU TO DO YOUR WORK, TAKE CARE OF THINGS AT HOME, OR GET ALONG WITH OTHER PEOPLE: NOT DIFFICULT AT ALL
1. FEELING NERVOUS, ANXIOUS, OR ON EDGE: 3
7. FEELING AFRAID AS IF SOMETHING AWFUL MIGHT HAPPEN: 2
3. WORRYING TOO MUCH ABOUT DIFFERENT THINGS: 2
2. NOT BEING ABLE TO STOP OR CONTROL WORRYING: 2
5. BEING SO RESTLESS THAT IT IS HARD TO SIT STILL: 1
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
## 2022-11-01 NOTE — PATIENT INSTRUCTIONS
Return to see Dr. Carlene Muñoz in 1 week. Utilize safety plans discussed today as needed. Call psychotherapy referrals for ongoing therapy. I will bridge care until pt is connected.

## 2022-11-01 NOTE — Clinical Note
Please see note for detailed suicide assessment. Provided safety resources today and encouraged pt and daughter to call 911/ER if pt's suicidal ideation worsens.

## 2022-12-15 RX ORDER — ATORVASTATIN CALCIUM 20 MG/1
20 TABLET, FILM COATED ORAL DAILY
Qty: 90 TABLET | Refills: 1 | Status: SHIPPED | OUTPATIENT
Start: 2022-12-15

## 2022-12-15 NOTE — TELEPHONE ENCOUNTER
Last appointment: 10/28/2022  Next appointment: Visit date not found  Last refill: 5/16/22   Sent [employer] message to schedule due/overdue appointment.

## 2022-12-26 RX ORDER — MIRTAZAPINE 7.5 MG/1
TABLET, FILM COATED ORAL
Qty: 90 TABLET | Refills: 1 | Status: SHIPPED | OUTPATIENT
Start: 2022-12-26

## 2023-03-15 RX ORDER — LEVOTHYROXINE SODIUM 0.05 MG/1
TABLET ORAL
Qty: 90 TABLET | Refills: 0 | Status: SHIPPED | OUTPATIENT
Start: 2023-03-15

## 2023-03-15 NOTE — TELEPHONE ENCOUNTER
Medication:   Requested Prescriptions     Pending Prescriptions Disp Refills    levothyroxine (SYNTHROID) 50 MCG tablet [Pharmacy Med Name: LEVOTHYROXINE 0.05MG (50MCG) TAB] 90 tablet 0     Sig: TAKE 1 TABLET BY MOUTH DAILY       Last Filled:  12/20/2022    Patient Phone Number: [phone] (home)     Last appt: 10/28/2022   Next appt: Visit date not found    Last Thyroid:   Lab Results   Component Value Date/Time    TSH 4.01 10/29/2021 03:12 PM    T4FREE 1.05 03/21/2011 10:09 AM

## 2023-03-18 NOTE — TELEPHONE ENCOUNTER
Last appointment: 10/28/2022  Next appointment: Visit date not found  Last refill: 10/10/2022      Left a message for patient to call next week to get scheduled

## 2023-03-19 RX ORDER — BENAZEPRIL HYDROCHLORIDE 40 MG/1
TABLET, FILM COATED ORAL
Qty: 90 TABLET | Refills: 0 | Status: SHIPPED | OUTPATIENT
Start: 2023-03-19

## 2023-05-23 RX ORDER — MIRTAZAPINE 7.5 MG/1
TABLET, FILM COATED ORAL
Qty: 90 TABLET | Refills: 1 | Status: SHIPPED | OUTPATIENT
Start: 2023-05-23

## 2023-06-17 DIAGNOSIS — E53.8 B12 DEFICIENCY: ICD-10-CM

## 2023-06-17 DIAGNOSIS — D63.1 ANEMIA DUE TO STAGE 3B CHRONIC KIDNEY DISEASE (HCC): ICD-10-CM

## 2023-06-17 DIAGNOSIS — N18.32 ANEMIA DUE TO STAGE 3B CHRONIC KIDNEY DISEASE (HCC): ICD-10-CM

## 2023-06-17 DIAGNOSIS — E03.4 HYPOTHYROIDISM DUE TO ACQUIRED ATROPHY OF THYROID: ICD-10-CM

## 2023-06-17 DIAGNOSIS — I10 ESSENTIAL HYPERTENSION: Primary | ICD-10-CM

## 2023-06-17 DIAGNOSIS — E78.00 PURE HYPERCHOLESTEROLEMIA: ICD-10-CM

## 2023-06-19 RX ORDER — LEVOTHYROXINE SODIUM 0.05 MG/1
TABLET ORAL
Qty: 90 TABLET | Refills: 0 | Status: SHIPPED | OUTPATIENT
Start: 2023-06-19

## 2023-06-19 RX ORDER — BENAZEPRIL HYDROCHLORIDE 40 MG/1
TABLET, FILM COATED ORAL
Qty: 90 TABLET | Refills: 0 | Status: SHIPPED | OUTPATIENT
Start: 2023-06-19

## 2023-06-19 NOTE — TELEPHONE ENCOUNTER
Last appointment: 10/28/2022  Next appointment: Visit date not found. Overdue  Last refill: 3/19/2023  #90 x0    Only same days available until August 18. Ok to wait or can same day appts be used? Patient was due to follow up last November.

## 2023-06-19 NOTE — TELEPHONE ENCOUNTER
Patient called back. Patient scheduled for first available. Advised patient Dr. Deanna Braswell would like him to have his labs drawn. Patient stated that he is now currently living by himself his house mate moved out last month and he will work on getting transportation to come get labs done.

## 2023-06-19 NOTE — TELEPHONE ENCOUNTER
Okay to wait until next available, but please remind him to have his labs drawn as soon as possible. New orders placed since labs pending from  .

## 2023-07-11 ENCOUNTER — TELEPHONE (OUTPATIENT)
Dept: INTERNAL MEDICINE CLINIC | Age: 88
End: 2023-07-11

## 2023-07-11 NOTE — TELEPHONE ENCOUNTER
----- Message from Glade Phalen sent at 7/11/2023  4:30 PM EDT -----  Subject: Appointment Request    Reason for Call: Established Patient Appointment needed: Routine Medicare   AWV    QUESTIONS    Reason for appointment request? Available appointments did not meet   patient need     Additional Information for Provider? Patients daughter in law Arron Llamas is   wanting to schedule physical for patient asap, she stated he is going into   an assisted living program and needs physical she is hoping for a Friday   if possible but will take first available.  Please contact to assist.   ---------------------------------------------------------------------------  --------------  Clotilde DON  [phone]; OK to leave message on voicemail  ---------------------------------------------------------------------------  --------------  SCRIPT ANSWERS

## 2023-08-11 RX ORDER — ATORVASTATIN CALCIUM 20 MG/1
20 TABLET, FILM COATED ORAL DAILY
Qty: 90 TABLET | Refills: 1 | Status: SHIPPED | OUTPATIENT
Start: 2023-08-11

## 2023-09-14 RX ORDER — LEVOTHYROXINE SODIUM 0.05 MG/1
TABLET ORAL
Qty: 90 TABLET | Refills: 0 | Status: SHIPPED | OUTPATIENT
Start: 2023-09-14

## 2023-09-14 RX ORDER — BENAZEPRIL HYDROCHLORIDE 40 MG/1
TABLET, FILM COATED ORAL
Qty: 90 TABLET | Refills: 0 | Status: SHIPPED | OUTPATIENT
Start: 2023-09-14

## 2023-10-04 RX ORDER — MIRTAZAPINE 7.5 MG/1
TABLET, FILM COATED ORAL
Qty: 90 TABLET | Refills: 1 | Status: SHIPPED | OUTPATIENT
Start: 2023-10-04

## 2023-11-01 PROBLEM — F32.9 MAJOR DEPRESSIVE DISORDER, SINGLE EPISODE, UNSPECIFIED: Status: ACTIVE | Noted: 2020-03-10

## 2023-11-01 NOTE — PROGRESS NOTES
Behavior: Behavior is slowed. Cognition and Memory: Cognition is not impaired. Judgment: Judgment normal.     On this date 11/3/2023 I have spent 70 minutes reviewing previous notes, test results and face to face with the patient discussing the diagnosis and importance of compliance with the treatment plan as well as documenting on the day of the visit. --Jesse Valencia MD on 11/3/2023 at 7:27 PM    An electronic signature was used to authenticate this note.

## 2023-11-03 ENCOUNTER — OFFICE VISIT (OUTPATIENT)
Dept: INTERNAL MEDICINE CLINIC | Age: 88
End: 2023-11-03
Payer: MEDICARE

## 2023-11-03 VITALS
HEART RATE: 97 BPM | HEIGHT: 64 IN | OXYGEN SATURATION: 95 % | SYSTOLIC BLOOD PRESSURE: 110 MMHG | BODY MASS INDEX: 20.45 KG/M2 | DIASTOLIC BLOOD PRESSURE: 70 MMHG | WEIGHT: 119.8 LBS

## 2023-11-03 DIAGNOSIS — E03.4 HYPOTHYROIDISM DUE TO ACQUIRED ATROPHY OF THYROID: ICD-10-CM

## 2023-11-03 DIAGNOSIS — F32.1 CURRENT MODERATE EPISODE OF MAJOR DEPRESSIVE DISORDER WITHOUT PRIOR EPISODE (HCC): ICD-10-CM

## 2023-11-03 DIAGNOSIS — R63.4 WEIGHT LOSS, UNINTENTIONAL: ICD-10-CM

## 2023-11-03 DIAGNOSIS — I10 ESSENTIAL HYPERTENSION: Primary | ICD-10-CM

## 2023-11-03 DIAGNOSIS — I12.9 HYPERTENSIVE KIDNEY DISEASE WITH STAGE 3B CHRONIC KIDNEY DISEASE (HCC): ICD-10-CM

## 2023-11-03 DIAGNOSIS — K59.01 SLOW TRANSIT CONSTIPATION: ICD-10-CM

## 2023-11-03 DIAGNOSIS — N18.32 HYPERTENSIVE KIDNEY DISEASE WITH STAGE 3B CHRONIC KIDNEY DISEASE (HCC): ICD-10-CM

## 2023-11-03 DIAGNOSIS — F51.01 PRIMARY INSOMNIA: ICD-10-CM

## 2023-11-03 DIAGNOSIS — E78.00 PURE HYPERCHOLESTEROLEMIA: ICD-10-CM

## 2023-11-03 PROCEDURE — 1123F ACP DISCUSS/DSCN MKR DOCD: CPT | Performed by: INTERNAL MEDICINE

## 2023-11-03 PROCEDURE — G8427 DOCREV CUR MEDS BY ELIG CLIN: HCPCS | Performed by: INTERNAL MEDICINE

## 2023-11-03 PROCEDURE — 1036F TOBACCO NON-USER: CPT | Performed by: INTERNAL MEDICINE

## 2023-11-03 PROCEDURE — G8420 CALC BMI NORM PARAMETERS: HCPCS | Performed by: INTERNAL MEDICINE

## 2023-11-03 PROCEDURE — G8484 FLU IMMUNIZE NO ADMIN: HCPCS | Performed by: INTERNAL MEDICINE

## 2023-11-03 PROCEDURE — 99215 OFFICE O/P EST HI 40 MIN: CPT | Performed by: INTERNAL MEDICINE

## 2023-11-03 RX ORDER — BENAZEPRIL HYDROCHLORIDE 20 MG/1
20 TABLET ORAL DAILY
Qty: 90 TABLET | Refills: 1 | Status: SHIPPED | OUTPATIENT
Start: 2023-11-03

## 2023-11-03 SDOH — ECONOMIC STABILITY: FOOD INSECURITY: WITHIN THE PAST 12 MONTHS, YOU WORRIED THAT YOUR FOOD WOULD RUN OUT BEFORE YOU GOT MONEY TO BUY MORE.: NEVER TRUE

## 2023-11-03 SDOH — ECONOMIC STABILITY: INCOME INSECURITY: HOW HARD IS IT FOR YOU TO PAY FOR THE VERY BASICS LIKE FOOD, HOUSING, MEDICAL CARE, AND HEATING?: NOT HARD AT ALL

## 2023-11-03 SDOH — ECONOMIC STABILITY: FOOD INSECURITY: WITHIN THE PAST 12 MONTHS, THE FOOD YOU BOUGHT JUST DIDN'T LAST AND YOU DIDN'T HAVE MONEY TO GET MORE.: NEVER TRUE

## 2023-11-03 ASSESSMENT — PATIENT HEALTH QUESTIONNAIRE - PHQ9
SUM OF ALL RESPONSES TO PHQ9 QUESTIONS 1 & 2: 3
SUM OF ALL RESPONSES TO PHQ QUESTIONS 1-9: 17
8. MOVING OR SPEAKING SO SLOWLY THAT OTHER PEOPLE COULD HAVE NOTICED. OR THE OPPOSITE, BEING SO FIGETY OR RESTLESS THAT YOU HAVE BEEN MOVING AROUND A LOT MORE THAN USUAL: 0
6. FEELING BAD ABOUT YOURSELF - OR THAT YOU ARE A FAILURE OR HAVE LET YOURSELF OR YOUR FAMILY DOWN: 3
3. TROUBLE FALLING OR STAYING ASLEEP: 2
SUM OF ALL RESPONSES TO PHQ QUESTIONS 1-9: 19
9. THOUGHTS THAT YOU WOULD BE BETTER OFF DEAD, OR OF HURTING YOURSELF: 2
SUM OF ALL RESPONSES TO PHQ QUESTIONS 1-9: 19
SUM OF ALL RESPONSES TO PHQ QUESTIONS 1-9: 19
2. FEELING DOWN, DEPRESSED OR HOPELESS: 2
4. FEELING TIRED OR HAVING LITTLE ENERGY: 3
7. TROUBLE CONCENTRATING ON THINGS, SUCH AS READING THE NEWSPAPER OR WATCHING TELEVISION: 3
5. POOR APPETITE OR OVEREATING: 3
1. LITTLE INTEREST OR PLEASURE IN DOING THINGS: 1

## 2023-11-03 ASSESSMENT — COLUMBIA-SUICIDE SEVERITY RATING SCALE - C-SSRS
5. HAVE YOU STARTED TO WORK OUT OR WORKED OUT THE DETAILS OF HOW TO KILL YOURSELF? DO YOU INTEND TO CARRY OUT THIS PLAN?: NO
4. HAVE YOU HAD THESE THOUGHTS AND HAD SOME INTENTION OF ACTING ON THEM?: YES
BASED ON RESPONSES TO C-SSRS QS 1-6, WHAT IS THE PATIENT'S OVERALL RISK RATING FOR SUICIDE: HIGH RISK
3. HAVE YOU BEEN THINKING ABOUT HOW YOU MIGHT KILL YOURSELF?: NO
2. HAVE YOU ACTUALLY HAD ANY THOUGHTS OF KILLING YOURSELF?: YES
6. HAVE YOU EVER DONE ANYTHING, STARTED TO DO ANYTHING, OR PREPARED TO DO ANYTHING TO END YOUR LIFE?: NO
1. WITHIN THE PAST MONTH, HAVE YOU WISHED YOU WERE DEAD OR WISHED YOU COULD GO TO SLEEP AND NOT WAKE UP?: NO
7. DID THIS OCCUR IN THE LAST THREE MONTHS: YES

## 2023-11-03 NOTE — ASSESSMENT & PLAN NOTE
He is satisfied with the quality of his sleep on current dose of Remeron- continue. No longer requiring trazodone.

## 2023-11-03 NOTE — ASSESSMENT & PLAN NOTE
Currently over-treated, will decrease benazepril to 20 mg qd. Family members will periodically check his BP and call if consistently > 140/90 or if orthostatic symptoms persist. He will have overdue labs drawn as soon as possible.

## 2023-11-03 NOTE — PATIENT INSTRUCTIONS
Safety Plan  Patient agrees to call family member if has active suicidal ideation.   Family will remove any weapons from the home  Declines referral to psychologist or psychiatry

## 2023-11-03 NOTE — ASSESSMENT & PLAN NOTE
Likely due to depression and poor dietary habits. Family will work with him on increasing his calorie and protein intake. Declines Meals on Wheels since he is a picky eater. He agrees to drink 1-2 bottles of Boost or Ensure/day.

## 2023-11-03 NOTE — ASSESSMENT & PLAN NOTE
When questioned in detail about his symptoms, he stated that he thinks about suicide intermittently when he has a setback with his health, usually related to his visual loss, and how he would accomplish this, but more of an intrusive thought than active suicidal ideation. Daughter visits him every day and will continue to monitor. Safety Plan documented in Patient Instructions. He declines additional anti-depressant medication, or referrals to psychology and/or psychiatry.

## 2023-11-03 NOTE — ASSESSMENT & PLAN NOTE
Still struggling despite daily Colace, but does not want to restart Miralax since has been hard to regulate in the past. Reviewed diet- very low in fiber. Does not like many vegetables, but agrees to eat several servings of fruit per day if family purchases and prepares for him.

## 2023-12-14 RX ORDER — LEVOTHYROXINE SODIUM 0.05 MG/1
TABLET ORAL
Qty: 90 TABLET | Refills: 1 | Status: SHIPPED | OUTPATIENT
Start: 2023-12-14

## 2023-12-14 NOTE — TELEPHONE ENCOUNTER
Last appointment: 11/3/2023  Next appointment: Visit date not found  Last refill: 9/14/23  Appointment not due for >6 months.

## 2023-12-17 RX ORDER — BENAZEPRIL HYDROCHLORIDE 40 MG/1
TABLET ORAL
Qty: 90 TABLET | Refills: 0 | OUTPATIENT
Start: 2023-12-17

## 2023-12-17 NOTE — TELEPHONE ENCOUNTER
Dose decreased to 20 mg 11/3/23 and new script provided for 90 with 1 RF. Please call pharmacy to confirm change.

## 2024-02-05 RX ORDER — ATORVASTATIN CALCIUM 20 MG/1
20 TABLET, FILM COATED ORAL DAILY
Qty: 90 TABLET | Refills: 1 | Status: SHIPPED | OUTPATIENT
Start: 2024-02-05

## 2024-02-05 NOTE — TELEPHONE ENCOUNTER
Last appointment: 11/3/2023  Next appointment: Visit date not found  Last refill: 8/11/2023  Due due back in until May

## 2024-03-05 RX ORDER — MIRTAZAPINE 7.5 MG/1
TABLET, FILM COATED ORAL
Qty: 90 TABLET | Refills: 1 | Status: SHIPPED | OUTPATIENT
Start: 2024-03-05

## 2024-03-05 NOTE — TELEPHONE ENCOUNTER
Last appointment: 11/3/2023  Next appointment: Visit date not found (Due 5/30/24)  Last refill: 10/4/23

## 2024-05-06 RX ORDER — BENAZEPRIL HYDROCHLORIDE 20 MG/1
20 TABLET ORAL DAILY
Qty: 90 TABLET | Refills: 1 | Status: SHIPPED | OUTPATIENT
Start: 2024-05-06

## 2024-05-10 RX ORDER — ATORVASTATIN CALCIUM 20 MG/1
20 TABLET, FILM COATED ORAL DAILY
Qty: 90 TABLET | Refills: 1 | OUTPATIENT
Start: 2024-05-10

## 2024-05-26 ENCOUNTER — APPOINTMENT (OUTPATIENT)
Age: 89
DRG: 291 | End: 2024-05-26
Payer: MEDICARE

## 2024-05-26 ENCOUNTER — HOSPITAL ENCOUNTER (INPATIENT)
Age: 89
LOS: 5 days | Discharge: NURSING, REHAB OR LONG-TERM CARE | DRG: 291 | End: 2024-05-31
Attending: EMERGENCY MEDICINE | Admitting: INTERNAL MEDICINE
Payer: MEDICARE

## 2024-05-26 DIAGNOSIS — I50.9 ACUTE CONGESTIVE HEART FAILURE, UNSPECIFIED HEART FAILURE TYPE (HCC): ICD-10-CM

## 2024-05-26 DIAGNOSIS — I48.91 ATRIAL FIBRILLATION, UNSPECIFIED TYPE (HCC): ICD-10-CM

## 2024-05-26 DIAGNOSIS — I48.91 ATRIAL FIBRILLATION WITH RAPID VENTRICULAR RESPONSE (HCC): Primary | ICD-10-CM

## 2024-05-26 DIAGNOSIS — N17.9 ACUTE KIDNEY INJURY SUPERIMPOSED ON CHRONIC KIDNEY DISEASE (HCC): ICD-10-CM

## 2024-05-26 DIAGNOSIS — N18.9 ACUTE KIDNEY INJURY SUPERIMPOSED ON CHRONIC KIDNEY DISEASE (HCC): ICD-10-CM

## 2024-05-26 DIAGNOSIS — R79.89 ELEVATED TROPONIN: ICD-10-CM

## 2024-05-26 LAB
ALBUMIN SERPL-MCNC: 3.6 G/DL (ref 3.4–5)
ALBUMIN/GLOB SERPL: 1.5 {RATIO}
ALP SERPL-CCNC: 109 U/L (ref 40–129)
ALT SERPL-CCNC: 6 U/L (ref 10–40)
ANION GAP SERPL CALCULATED.3IONS-SCNC: 14 MMOL/L (ref 3–16)
AST SERPL-CCNC: 17 U/L (ref 15–37)
B PARAP IS1001 DNA NPH QL NAA+NON-PROBE: NOT DETECTED
B PERT DNA SPEC QL NAA+PROBE: NOT DETECTED
BASOPHILS # BLD: 0.01 K/UL (ref 0–0.2)
BASOPHILS NFR BLD: 0 %
BILIRUB SERPL-MCNC: 0.7 MG/DL (ref 0–1)
BNP SERPL-MCNC: ABNORMAL PG/ML (ref 0–449)
BUN SERPL-MCNC: 26 MG/DL (ref 7–20)
C PNEUM DNA NPH QL NAA+NON-PROBE: NOT DETECTED
CALCIUM SERPL-MCNC: 8.4 MG/DL (ref 8.3–10.6)
CHLORIDE SERPL-SCNC: 108 MMOL/L (ref 99–110)
CO2 SERPL-SCNC: 19 MMOL/L (ref 21–32)
CREAT SERPL-MCNC: 2.5 MG/DL (ref 0.8–1.3)
EKG DIAGNOSIS: NORMAL
EKG Q-T INTERVAL: 262 MS
EKG QRS DURATION: 72 MS
EKG QTC CALCULATION (BAZETT): 401 MS
EKG R AXIS: 15 DEGREES
EKG T AXIS: -6 DEGREES
EKG VENTRICULAR RATE: 141 BPM
EOSINOPHIL # BLD: 0.06 K/UL (ref 0–0.6)
EOSINOPHILS RELATIVE PERCENT: 1 %
ERYTHROCYTE [DISTWIDTH] IN BLOOD BY AUTOMATED COUNT: 15.9 % (ref 12.4–15.4)
FLUAV RNA NPH QL NAA+NON-PROBE: NOT DETECTED
FLUBV RNA NPH QL NAA+NON-PROBE: NOT DETECTED
GFR, ESTIMATED: 25 ML/MIN/1.73M2
GLUCOSE SERPL-MCNC: 93 MG/DL (ref 70–99)
HADV DNA NPH QL NAA+NON-PROBE: NOT DETECTED
HCOV 229E RNA NPH QL NAA+NON-PROBE: NOT DETECTED
HCOV HKU1 RNA NPH QL NAA+NON-PROBE: NOT DETECTED
HCOV NL63 RNA NPH QL NAA+NON-PROBE: NOT DETECTED
HCOV OC43 RNA NPH QL NAA+NON-PROBE: NOT DETECTED
HCT VFR BLD AUTO: 39.5 % (ref 40.5–52.5)
HGB BLD-MCNC: 13.1 G/DL (ref 13.5–17.5)
HMPV RNA NPH QL NAA+NON-PROBE: NOT DETECTED
HPIV1 RNA NPH QL NAA+NON-PROBE: NOT DETECTED
HPIV2 RNA NPH QL NAA+NON-PROBE: NOT DETECTED
HPIV3 RNA NPH QL NAA+NON-PROBE: NOT DETECTED
HPIV4 RNA NPH QL NAA+NON-PROBE: NOT DETECTED
IMM GRANULOCYTES # BLD AUTO: 0.03 K/UL (ref 0–0.5)
IMM GRANULOCYTES NFR BLD: 1 %
LACTATE BLDV-SCNC: 1.7 MMOL/L (ref 0.4–1.9)
LACTATE BLDV-SCNC: 2 MMOL/L (ref 0.4–1.9)
LYMPHOCYTES NFR BLD: 0.38 K/UL (ref 1–5.1)
LYMPHOCYTES RELATIVE PERCENT: 6 %
M PNEUMO DNA NPH QL NAA+NON-PROBE: NOT DETECTED
MAGNESIUM SERPL-MCNC: 1.8 MG/DL (ref 1.8–2.4)
MCH RBC QN AUTO: 31.3 PG (ref 26–34)
MCHC RBC AUTO-ENTMCNC: 33.2 G/DL (ref 31–36)
MCV RBC AUTO: 94.3 FL (ref 80–100)
MONOCYTES NFR BLD: 0.51 K/UL (ref 0–1.3)
MONOCYTES NFR BLD: 8 %
NEUTROPHILS NFR BLD: 85 %
NEUTS SEG NFR BLD: 5.57 K/UL (ref 1.7–7.7)
PLATELET # BLD AUTO: 213 K/UL (ref 135–450)
PMV BLD AUTO: 9.3 FL
POTASSIUM SERPL-SCNC: 3.9 MMOL/L (ref 3.5–5.1)
PROCALCITONIN SERPL-MCNC: 0.24 NG/ML (ref 0–0.15)
PROT SERPL-MCNC: 6.1 G/DL (ref 6.4–8.2)
RBC # BLD AUTO: 4.19 M/UL (ref 4.2–5.9)
RSV RNA NPH QL NAA+NON-PROBE: NOT DETECTED
RV+EV RNA NPH QL NAA+NON-PROBE: NOT DETECTED
SARS-COV-2 RNA NPH QL NAA+NON-PROBE: NOT DETECTED
SODIUM SERPL-SCNC: 140 MMOL/L (ref 136–145)
SPECIMEN DESCRIPTION: NORMAL
TROPONIN I SERPL HS-MCNC: 57 NG/L (ref 0–22)
TROPONIN I SERPL HS-MCNC: 58 NG/L (ref 0–22)
WBC OTHER # BLD: 6.6 K/UL (ref 4–11)

## 2024-05-26 PROCEDURE — 2580000003 HC RX 258: Performed by: EMERGENCY MEDICINE

## 2024-05-26 PROCEDURE — 96374 THER/PROPH/DIAG INJ IV PUSH: CPT

## 2024-05-26 PROCEDURE — 84484 ASSAY OF TROPONIN QUANT: CPT

## 2024-05-26 PROCEDURE — 6360000002 HC RX W HCPCS: Performed by: INTERNAL MEDICINE

## 2024-05-26 PROCEDURE — 87040 BLOOD CULTURE FOR BACTERIA: CPT

## 2024-05-26 PROCEDURE — 6370000000 HC RX 637 (ALT 250 FOR IP): Performed by: EMERGENCY MEDICINE

## 2024-05-26 PROCEDURE — 2500000003 HC RX 250 WO HCPCS: Performed by: EMERGENCY MEDICINE

## 2024-05-26 PROCEDURE — 36415 COLL VENOUS BLD VENIPUNCTURE: CPT

## 2024-05-26 PROCEDURE — 2580000003 HC RX 258: Performed by: INTERNAL MEDICINE

## 2024-05-26 PROCEDURE — 2060000000 HC ICU INTERMEDIATE R&B

## 2024-05-26 PROCEDURE — 80053 COMPREHEN METABOLIC PANEL: CPT

## 2024-05-26 PROCEDURE — 93005 ELECTROCARDIOGRAM TRACING: CPT

## 2024-05-26 PROCEDURE — 87449 NOS EACH ORGANISM AG IA: CPT

## 2024-05-26 PROCEDURE — 0202U NFCT DS 22 TRGT SARS-COV-2: CPT

## 2024-05-26 PROCEDURE — 2700000000 HC OXYGEN THERAPY PER DAY

## 2024-05-26 PROCEDURE — 84145 PROCALCITONIN (PCT): CPT

## 2024-05-26 PROCEDURE — 6370000000 HC RX 637 (ALT 250 FOR IP): Performed by: INTERNAL MEDICINE

## 2024-05-26 PROCEDURE — 87324 CLOSTRIDIUM AG IA: CPT

## 2024-05-26 PROCEDURE — 83880 ASSAY OF NATRIURETIC PEPTIDE: CPT

## 2024-05-26 PROCEDURE — 96375 TX/PRO/DX INJ NEW DRUG ADDON: CPT

## 2024-05-26 PROCEDURE — 71046 X-RAY EXAM CHEST 2 VIEWS: CPT

## 2024-05-26 PROCEDURE — 83735 ASSAY OF MAGNESIUM: CPT

## 2024-05-26 PROCEDURE — 99291 CRITICAL CARE FIRST HOUR: CPT

## 2024-05-26 PROCEDURE — 6360000002 HC RX W HCPCS: Performed by: EMERGENCY MEDICINE

## 2024-05-26 PROCEDURE — 94761 N-INVAS EAR/PLS OXIMETRY MLT: CPT

## 2024-05-26 PROCEDURE — 83605 ASSAY OF LACTIC ACID: CPT

## 2024-05-26 PROCEDURE — 85025 COMPLETE CBC W/AUTO DIFF WBC: CPT

## 2024-05-26 RX ORDER — DILTIAZEM HYDROCHLORIDE 5 MG/ML
10 INJECTION INTRAVENOUS ONCE
Status: COMPLETED | OUTPATIENT
Start: 2024-05-26 | End: 2024-05-26

## 2024-05-26 RX ORDER — SODIUM CHLORIDE 0.9 % (FLUSH) 0.9 %
5-40 SYRINGE (ML) INJECTION EVERY 12 HOURS SCHEDULED
Status: DISCONTINUED | OUTPATIENT
Start: 2024-05-26 | End: 2024-05-31 | Stop reason: HOSPADM

## 2024-05-26 RX ORDER — LEVOTHYROXINE SODIUM 0.03 MG/1
50 TABLET ORAL DAILY
Status: DISCONTINUED | OUTPATIENT
Start: 2024-05-27 | End: 2024-05-31 | Stop reason: HOSPADM

## 2024-05-26 RX ORDER — SODIUM CHLORIDE 9 MG/ML
INJECTION, SOLUTION INTRAVENOUS PRN
Status: DISCONTINUED | OUTPATIENT
Start: 2024-05-26 | End: 2024-05-31 | Stop reason: HOSPADM

## 2024-05-26 RX ORDER — POLYETHYLENE GLYCOL 3350 17 G/17G
17 POWDER, FOR SOLUTION ORAL DAILY PRN
Status: DISCONTINUED | OUTPATIENT
Start: 2024-05-26 | End: 2024-05-31 | Stop reason: HOSPADM

## 2024-05-26 RX ORDER — ONDANSETRON 4 MG/1
4 TABLET, ORALLY DISINTEGRATING ORAL EVERY 8 HOURS PRN
Status: DISCONTINUED | OUTPATIENT
Start: 2024-05-26 | End: 2024-05-31 | Stop reason: HOSPADM

## 2024-05-26 RX ORDER — ONDANSETRON 2 MG/ML
4 INJECTION INTRAMUSCULAR; INTRAVENOUS EVERY 6 HOURS PRN
Status: DISCONTINUED | OUTPATIENT
Start: 2024-05-26 | End: 2024-05-31 | Stop reason: HOSPADM

## 2024-05-26 RX ORDER — ASPIRIN 81 MG/1
324 TABLET, CHEWABLE ORAL ONCE
Status: COMPLETED | OUTPATIENT
Start: 2024-05-26 | End: 2024-05-26

## 2024-05-26 RX ORDER — DOCUSATE SODIUM 100 MG/1
100 CAPSULE, LIQUID FILLED ORAL 2 TIMES DAILY
Status: DISCONTINUED | OUTPATIENT
Start: 2024-05-26 | End: 2024-05-31 | Stop reason: HOSPADM

## 2024-05-26 RX ORDER — DILTIAZEM HCL IN NACL,ISO-OSM 125 MG/125
2.5-15 PLASTIC BAG, INJECTION (ML) INTRAVENOUS CONTINUOUS
Status: DISCONTINUED | OUTPATIENT
Start: 2024-05-26 | End: 2024-05-27

## 2024-05-26 RX ORDER — SODIUM CHLORIDE 0.9 % (FLUSH) 0.9 %
5-40 SYRINGE (ML) INJECTION PRN
Status: DISCONTINUED | OUTPATIENT
Start: 2024-05-26 | End: 2024-05-31 | Stop reason: HOSPADM

## 2024-05-26 RX ORDER — MIRTAZAPINE 15 MG/1
7.5 TABLET, FILM COATED ORAL NIGHTLY
Status: DISCONTINUED | OUTPATIENT
Start: 2024-05-26 | End: 2024-05-31 | Stop reason: HOSPADM

## 2024-05-26 RX ORDER — ATORVASTATIN CALCIUM 20 MG/1
20 TABLET, FILM COATED ORAL DAILY
Status: DISCONTINUED | OUTPATIENT
Start: 2024-05-27 | End: 2024-05-31 | Stop reason: HOSPADM

## 2024-05-26 RX ORDER — FUROSEMIDE 10 MG/ML
20 INJECTION INTRAMUSCULAR; INTRAVENOUS 2 TIMES DAILY
Status: DISCONTINUED | OUTPATIENT
Start: 2024-05-26 | End: 2024-05-27

## 2024-05-26 RX ORDER — ACETAMINOPHEN 650 MG/1
650 SUPPOSITORY RECTAL EVERY 6 HOURS PRN
Status: DISCONTINUED | OUTPATIENT
Start: 2024-05-26 | End: 2024-05-31 | Stop reason: HOSPADM

## 2024-05-26 RX ORDER — ACETAMINOPHEN 325 MG/1
650 TABLET ORAL EVERY 6 HOURS PRN
Status: DISCONTINUED | OUTPATIENT
Start: 2024-05-26 | End: 2024-05-31 | Stop reason: HOSPADM

## 2024-05-26 RX ORDER — HEPARIN SODIUM 5000 [USP'U]/ML
5000 INJECTION, SOLUTION INTRAVENOUS; SUBCUTANEOUS EVERY 8 HOURS SCHEDULED
Status: DISCONTINUED | OUTPATIENT
Start: 2024-05-26 | End: 2024-05-31 | Stop reason: HOSPADM

## 2024-05-26 RX ADMIN — Medication 10 ML: at 20:05

## 2024-05-26 RX ADMIN — DOCUSATE SODIUM 100 MG: 100 CAPSULE, LIQUID FILLED ORAL at 20:06

## 2024-05-26 RX ADMIN — Medication 5 MG/HR: at 11:46

## 2024-05-26 RX ADMIN — ACETAMINOPHEN 650 MG: 325 TABLET ORAL at 20:13

## 2024-05-26 RX ADMIN — MIRTAZAPINE 7.5 MG: 15 TABLET, FILM COATED ORAL at 20:06

## 2024-05-26 RX ADMIN — HEPARIN SODIUM 5000 UNITS: 5000 INJECTION INTRAVENOUS; SUBCUTANEOUS at 16:08

## 2024-05-26 RX ADMIN — DILTIAZEM HYDROCHLORIDE 10 MG: 5 INJECTION, SOLUTION INTRAVENOUS at 11:37

## 2024-05-26 RX ADMIN — WATER 1000 MG: 1 INJECTION INTRAMUSCULAR; INTRAVENOUS; SUBCUTANEOUS at 11:37

## 2024-05-26 RX ADMIN — Medication 12.5 MG/HR: at 23:25

## 2024-05-26 RX ADMIN — FUROSEMIDE 20 MG: 10 INJECTION, SOLUTION INTRAMUSCULAR; INTRAVENOUS at 16:08

## 2024-05-26 RX ADMIN — ASPIRIN 81 MG 324 MG: 81 TABLET ORAL at 12:14

## 2024-05-26 RX ADMIN — AZITHROMYCIN MONOHYDRATE 500 MG: 500 INJECTION, POWDER, LYOPHILIZED, FOR SOLUTION INTRAVENOUS at 12:14

## 2024-05-26 ASSESSMENT — PAIN SCALES - GENERAL
PAINLEVEL_OUTOF10: 5
PAINLEVEL_OUTOF10: 0
PAINLEVEL_OUTOF10: 8
PAINLEVEL_OUTOF10: 3

## 2024-05-26 ASSESSMENT — PAIN DESCRIPTION - PAIN TYPE
TYPE: ACUTE PAIN
TYPE: ACUTE PAIN

## 2024-05-26 ASSESSMENT — PAIN DESCRIPTION - FREQUENCY
FREQUENCY: INTERMITTENT
FREQUENCY: INTERMITTENT

## 2024-05-26 ASSESSMENT — PAIN DESCRIPTION - LOCATION
LOCATION: HEAD
LOCATION: FINGER (COMMENT WHICH ONE)
LOCATION: JAW

## 2024-05-26 ASSESSMENT — PAIN DESCRIPTION - ORIENTATION
ORIENTATION: RIGHT
ORIENTATION: RIGHT

## 2024-05-26 ASSESSMENT — PAIN DESCRIPTION - ONSET
ONSET: SUDDEN
ONSET: PROGRESSIVE

## 2024-05-26 ASSESSMENT — PAIN DESCRIPTION - DESCRIPTORS
DESCRIPTORS: ACHING
DESCRIPTORS: CRAMPING

## 2024-05-26 ASSESSMENT — PAIN - FUNCTIONAL ASSESSMENT
PAIN_FUNCTIONAL_ASSESSMENT: ACTIVITIES ARE NOT PREVENTED
PAIN_FUNCTIONAL_ASSESSMENT: ACTIVITIES ARE NOT PREVENTED
PAIN_FUNCTIONAL_ASSESSMENT: 0-10

## 2024-05-26 ASSESSMENT — PAIN SCALES - WONG BAKER: WONGBAKER_NUMERICALRESPONSE: NO HURT

## 2024-05-26 NOTE — PROGRESS NOTES
Pt admitted to PCU from ED. A&o x4. Pt is blind. Resp even/unlabored on RA, no sob. No cp/pressure, tele reading afib 130s-140s. Cardizem drip infusing at 10ml/hr. BP stable. Pt educated on call bell. Fall precautions in place. Bed low, call bell in reach, instructed to call for assist.
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
## 2024-05-26 NOTE — ED NOTES
Status: oriented, alert, coherent, logical, thought processes intact, and able to concentrate and follow conversation  Orientation Level: Orientation Level: Oriented X4  NIH Score:    C-SSRS: Risk of Suicide: No Risk  Bedside swallow:    Reshma Coma Scale (GCS): Reshma Coma Scale  Eye Opening: Spontaneous  Best Verbal Response: Oriented  Best Motor Response: Obeys commands  [place] Coma Scale Score: 15  Active LDA's:   Peripheral IV 05/26/24 Right Antecubital (Active)       Peripheral IV 05/26/24 Right Forearm (Active)                 PO Status: Regular  Pertinent or High Risk Medications/Drips: CARDIZEM @ 10MG/HR    Pending Blood Product Administration: no       You may also review the ED PT Care Timeline found under the Summary Nursing Index tab.    Recommendation    Additional Comments: PATIENT MISSING LEFT EYE AND COMPLETELY BLIND IN RIGHT EYE. AMBULATORY, LIVES ALONE. SOB WITH EXERTION X1 WEEK. AFIB RVR ON CARDIZEM AT 10MG/HR. ANTIBIOTICS GIVEN FOR PNA ON CXR. SON AT BEDSIDE.       Electronically signed by: Electronically signed by Moisés Haley RN on 5/26/2024 at 12:33 PM

## 2024-05-26 NOTE — CONSULTS
Annie michele    Lalo Moulton is a 88 y.o.  male with PMH of hypertension, chronic HFpEF, hyperlipidemia, CKD stage III, GERD, hypothyroidism, visually impaired/legally blind,admitted for SOB   Found to have:   Leg edema   Elevated BP   AF-RVR    BNP 20 k   Cr 2.5<--1.5    A/P   EFREN on CKD 2/2 CRS  Agree w/ Dilt for rate control, lasix  Also: strict I/O  UA  BMp 1-2/ day   I will see pt in AM       Jayashree Zafar MD

## 2024-05-26 NOTE — H&P
History and Physical      Name:  Lalo Moulton /Age/Sex: 1935  (88 y.o. male)   MRN & CSN:  [id number] & [id number] Admission Date/Time: 2024 10:10 AM   Location:   PCP: Kameron, Yanira TRINIDAD MD       Hospital Day: 1    Assessment and Plan:   Lalo Moulton is a 88 y.o.  male with past medical history of hypertension, chronic HFpEF, hyperlipidemia, CKD stage III, GERD, hypothyroidism, visually impaired/legally blind, came to the emergency room complaining of shortness of breath/dyspnea on exertion since past 7 days.    Upon arrival to emergency room, patient was noted to be in A-fib with RVR.  proBNP elevated at 20,083, troponin 58, 57.  Creatinine elevated at 2.5, bicarb 19, hemoglobin/hematocrit 13.1/39.5, platelet 213.    X-ray chest  1. Mild bilateral basilar airspace disease and small pleural effusions     Echo 2024  A bubble study was performed.   Left ventricular wall motion is normal.   Overall left ventricular ejection fraction is estimated to be 55-60%.   Diastolic function is impaired and classified as Grade 1 (impaired   relaxation).   Mitral valve leaflets are mildly calcified in appearance.   Aortic valve is mildly calcified.   Injection of agitated saline showed no interatrial shunt.   Right ventricular systolic pressure is moderately elevated at 45-55 mmHg.   No significant valvular abnormalities identified.   There is no obvious cardiac source of emboli noted.     Assessment    Atrial fibrillation with RVR-new onset  Acute on chronic HFpEF exacerbation  EFREN on CKD stage III  Elevated troponin-unlikely ACS.  Community-acquired bacterial pneumonia  S/p fall  Hypertension  Hyperlipidemia  GERD  Hypothyroidism  Visually impaired/legally blind    Plan    Telemetry monitoring  Continue Cardizem gtt.  Obtain limited echo  Consult cardiology  [id number] of 4 (age, CHF, hypertension).  Defer decision of long-term anticoagulation to cardiology.  Patient is high risk for falls.  As

## 2024-05-26 NOTE — PROGRESS NOTES
Patient transported from ED to inpatient room with cardizem and antibiotics infusing. On monitor for transport. Bedside report done with Ciera MARCH. Care transferred to inpatient team.

## 2024-05-26 NOTE — PROGRESS NOTES
4 Eyes Skin Assessment     NAME:  Lalo Moulton  YOB: 1935  MEDICAL RECORD NUMBER:  [id number]    The patient is being assessed for  Admission    I agree that at least one RN has performed a thorough Head to Toe Skin Assessment on the patient. ALL assessment sites listed below have been assessed.      Areas assessed by both nurses:    Head, Face, Ears, Shoulders, Back, Chest, Arms, Elbows, Hands, Sacrum. Buttock, Coccyx, Ischium, and Legs. Feet and Heels        Does the Patient have a Wound? No noted wound(s)       Duncan Prevention initiated by RN: Yes  Wound Care Orders initiated by RN: No    Pressure Injury (Stage 3,4, Unstageable, DTI, NWPT, and Complex wounds) if present, place Wound referral order by RN under : No    New Ostomies, if present place, Ostomy referral order under : No     Nurse 1 eSignature: Electronically signed by Ariana Vasquez RN on 5/26/24 at 6:30 PM EDT    **SHARE this note so that the co-signing nurse can place an eSignature**    Nurse 2 eSignature: Electronically signed by BLAKE NARVAEZ RN on 5/26/24 at 6:31 PM EDT

## 2024-05-27 PROBLEM — E87.20 METABOLIC ACIDOSIS: Status: ACTIVE | Noted: 2024-05-27

## 2024-05-27 PROBLEM — N18.31 STAGE 3A CHRONIC KIDNEY DISEASE (HCC): Status: ACTIVE | Noted: 2024-05-27

## 2024-05-27 PROBLEM — E43 SEVERE MALNUTRITION (HCC): Chronic | Status: ACTIVE | Noted: 2024-05-27

## 2024-05-27 PROBLEM — N17.9 AKI (ACUTE KIDNEY INJURY) (HCC): Status: ACTIVE | Noted: 2024-05-27

## 2024-05-27 LAB
ALBUMIN SERPL-MCNC: 3.3 G/DL (ref 3.4–5)
ALBUMIN/GLOB SERPL: 1.4 {RATIO}
ALP SERPL-CCNC: 106 U/L (ref 40–129)
ALT SERPL-CCNC: <5 U/L (ref 10–40)
ANION GAP SERPL CALCULATED.3IONS-SCNC: 14 MMOL/L (ref 3–16)
AST SERPL-CCNC: 14 U/L (ref 15–37)
BASOPHILS # BLD: 0.01 K/UL (ref 0–0.2)
BASOPHILS NFR BLD: 0 %
BILIRUB SERPL-MCNC: 0.6 MG/DL (ref 0–1)
BILIRUB UR QL STRIP: NEGATIVE
BUN SERPL-MCNC: 24 MG/DL (ref 7–20)
CALCIUM SERPL-MCNC: 8.1 MG/DL (ref 8.3–10.6)
CHARACTER UR: ABNORMAL
CHLORIDE SERPL-SCNC: 108 MMOL/L (ref 99–110)
CLARITY UR: CLEAR
CO2 SERPL-SCNC: 17 MMOL/L (ref 21–32)
COLOR UR: YELLOW
CREAT SERPL-MCNC: 2.1 MG/DL (ref 0.8–1.3)
EOSINOPHIL # BLD: 0.14 K/UL (ref 0–0.6)
EOSINOPHILS RELATIVE PERCENT: 3 %
ERYTHROCYTE [DISTWIDTH] IN BLOOD BY AUTOMATED COUNT: 15.6 % (ref 12.4–15.4)
GFR, ESTIMATED: 30 ML/MIN/1.73M2
GLUCOSE SERPL-MCNC: 76 MG/DL (ref 70–99)
GLUCOSE UR STRIP-MCNC: NEGATIVE MG/DL
HCT VFR BLD AUTO: 38.2 % (ref 40.5–52.5)
HGB BLD-MCNC: 12.8 G/DL (ref 13.5–17.5)
HGB UR QL STRIP.AUTO: NEGATIVE
IMM GRANULOCYTES # BLD AUTO: 0.02 K/UL (ref 0–0.5)
IMM GRANULOCYTES NFR BLD: 0 %
KETONES UR STRIP-MCNC: NEGATIVE MG/DL
LEUKOCYTE ESTERASE UR QL STRIP: NEGATIVE
LYMPHOCYTES NFR BLD: 0.51 K/UL (ref 1–5.1)
LYMPHOCYTES RELATIVE PERCENT: 11 %
MCH RBC QN AUTO: 31.3 PG (ref 26–34)
MCHC RBC AUTO-ENTMCNC: 33.5 G/DL (ref 31–36)
MCV RBC AUTO: 93.4 FL (ref 80–100)
MONOCYTES NFR BLD: 0.45 K/UL (ref 0–1.3)
MONOCYTES NFR BLD: 10 %
NEUTROPHILS NFR BLD: 75 %
NEUTS SEG NFR BLD: 3.46 K/UL (ref 1.7–7.7)
NITRITE UR QL STRIP: NEGATIVE
PH UR STRIP: 5.5 [PH] (ref 5–8)
PLATELET # BLD AUTO: 191 K/UL (ref 135–450)
PMV BLD AUTO: 9.6 FL
POTASSIUM SERPL-SCNC: 3.7 MMOL/L (ref 3.5–5.1)
PROT SERPL-MCNC: 5.8 G/DL (ref 6.4–8.2)
PROT UR STRIP-MCNC: 100 MG/DL
RBC # BLD AUTO: 4.09 M/UL (ref 4.2–5.9)
RBC #/AREA URNS HPF: ABNORMAL /HPF
SODIUM SERPL-SCNC: 139 MMOL/L (ref 136–145)
SP GR UR STRIP: >1.03 (ref 1–1.03)
UROBILINOGEN UR STRIP-ACNC: 0.2 EU/DL (ref 0–1)
WBC #/AREA URNS HPF: ABNORMAL /HPF
WBC OTHER # BLD: 4.6 K/UL (ref 4–11)

## 2024-05-27 PROCEDURE — 2060000000 HC ICU INTERMEDIATE R&B

## 2024-05-27 PROCEDURE — 99223 1ST HOSP IP/OBS HIGH 75: CPT | Performed by: INTERNAL MEDICINE

## 2024-05-27 PROCEDURE — 97116 GAIT TRAINING THERAPY: CPT

## 2024-05-27 PROCEDURE — 85025 COMPLETE CBC W/AUTO DIFF WBC: CPT

## 2024-05-27 PROCEDURE — 97162 PT EVAL MOD COMPLEX 30 MIN: CPT

## 2024-05-27 PROCEDURE — 80053 COMPREHEN METABOLIC PANEL: CPT

## 2024-05-27 PROCEDURE — 6370000000 HC RX 637 (ALT 250 FOR IP): Performed by: INTERNAL MEDICINE

## 2024-05-27 PROCEDURE — 81001 URINALYSIS AUTO W/SCOPE: CPT

## 2024-05-27 PROCEDURE — 2580000003 HC RX 258: Performed by: INTERNAL MEDICINE

## 2024-05-27 PROCEDURE — 2500000003 HC RX 250 WO HCPCS: Performed by: EMERGENCY MEDICINE

## 2024-05-27 PROCEDURE — 6360000002 HC RX W HCPCS: Performed by: INTERNAL MEDICINE

## 2024-05-27 PROCEDURE — 97530 THERAPEUTIC ACTIVITIES: CPT

## 2024-05-27 PROCEDURE — 36415 COLL VENOUS BLD VENIPUNCTURE: CPT

## 2024-05-27 PROCEDURE — 97166 OT EVAL MOD COMPLEX 45 MIN: CPT

## 2024-05-27 RX ORDER — WATER 10 ML/10ML
INJECTION INTRAMUSCULAR; INTRAVENOUS; SUBCUTANEOUS
Status: DISPENSED
Start: 2024-05-27 | End: 2024-05-28

## 2024-05-27 RX ORDER — SODIUM BICARBONATE 650 MG/1
650 TABLET ORAL DAILY
Status: DISCONTINUED | OUTPATIENT
Start: 2024-05-28 | End: 2024-05-31 | Stop reason: HOSPADM

## 2024-05-27 RX ORDER — FUROSEMIDE 10 MG/ML
40 INJECTION INTRAMUSCULAR; INTRAVENOUS 2 TIMES DAILY
Status: DISCONTINUED | OUTPATIENT
Start: 2024-05-27 | End: 2024-05-28

## 2024-05-27 RX ORDER — LANOLIN ALCOHOL/MO/W.PET/CERES
3 CREAM (GRAM) TOPICAL NIGHTLY PRN
Status: DISCONTINUED | OUTPATIENT
Start: 2024-05-27 | End: 2024-05-31 | Stop reason: HOSPADM

## 2024-05-27 RX ORDER — OLANZAPINE 10 MG/2ML
5 INJECTION, POWDER, FOR SOLUTION INTRAMUSCULAR ONCE
Status: COMPLETED | OUTPATIENT
Start: 2024-05-27 | End: 2024-05-27

## 2024-05-27 RX ADMIN — ATORVASTATIN CALCIUM 20 MG: 20 TABLET, FILM COATED ORAL at 08:04

## 2024-05-27 RX ADMIN — FUROSEMIDE 40 MG: 10 INJECTION, SOLUTION INTRAMUSCULAR; INTRAVENOUS at 17:55

## 2024-05-27 RX ADMIN — HEPARIN SODIUM 5000 UNITS: 5000 INJECTION INTRAVENOUS; SUBCUTANEOUS at 14:25

## 2024-05-27 RX ADMIN — METOPROLOL TARTRATE 25 MG: 25 TABLET, FILM COATED ORAL at 21:05

## 2024-05-27 RX ADMIN — LEVOTHYROXINE SODIUM 50 MCG: 25 TABLET ORAL at 08:04

## 2024-05-27 RX ADMIN — FUROSEMIDE 40 MG: 10 INJECTION, SOLUTION INTRAMUSCULAR; INTRAVENOUS at 09:26

## 2024-05-27 RX ADMIN — Medication 10 ML: at 08:07

## 2024-05-27 RX ADMIN — AZITHROMYCIN MONOHYDRATE 500 MG: 500 INJECTION, POWDER, LYOPHILIZED, FOR SOLUTION INTRAVENOUS at 12:33

## 2024-05-27 RX ADMIN — HEPARIN SODIUM 5000 UNITS: 5000 INJECTION INTRAVENOUS; SUBCUTANEOUS at 21:05

## 2024-05-27 RX ADMIN — METOPROLOL TARTRATE 25 MG: 25 TABLET, FILM COATED ORAL at 08:04

## 2024-05-27 RX ADMIN — DOCUSATE SODIUM 100 MG: 100 CAPSULE, LIQUID FILLED ORAL at 21:04

## 2024-05-27 RX ADMIN — ACETAMINOPHEN 650 MG: 325 TABLET ORAL at 21:04

## 2024-05-27 RX ADMIN — Medication 10 ML: at 21:06

## 2024-05-27 RX ADMIN — DOCUSATE SODIUM 100 MG: 100 CAPSULE, LIQUID FILLED ORAL at 08:04

## 2024-05-27 RX ADMIN — MIRTAZAPINE 7.5 MG: 15 TABLET, FILM COATED ORAL at 21:03

## 2024-05-27 RX ADMIN — Medication 15 MG/HR: at 05:36

## 2024-05-27 RX ADMIN — Medication 3 MG: at 21:05

## 2024-05-27 RX ADMIN — WATER 1000 MG: 1 INJECTION INTRAMUSCULAR; INTRAVENOUS; SUBCUTANEOUS at 12:19

## 2024-05-27 RX ADMIN — HEPARIN SODIUM 5000 UNITS: 5000 INJECTION INTRAVENOUS; SUBCUTANEOUS at 05:35

## 2024-05-27 RX ADMIN — OLANZAPINE 5 MG: 10 INJECTION, POWDER, FOR SOLUTION INTRAMUSCULAR at 22:32

## 2024-05-27 ASSESSMENT — PAIN SCALES - WONG BAKER
WONGBAKER_NUMERICALRESPONSE: NO HURT
WONGBAKER_NUMERICALRESPONSE: NO HURT

## 2024-05-27 ASSESSMENT — PAIN SCALES - GENERAL
PAINLEVEL_OUTOF10: 4
PAINLEVEL_OUTOF10: 0

## 2024-05-27 ASSESSMENT — PAIN DESCRIPTION - LOCATION: LOCATION: BACK

## 2024-05-27 ASSESSMENT — PAIN DESCRIPTION - ONSET: ONSET: SUDDEN

## 2024-05-27 ASSESSMENT — PAIN DESCRIPTION - PAIN TYPE: TYPE: ACUTE PAIN

## 2024-05-27 ASSESSMENT — PAIN DESCRIPTION - ORIENTATION: ORIENTATION: MID

## 2024-05-27 ASSESSMENT — PAIN DESCRIPTION - DESCRIPTORS: DESCRIPTORS: ACHING

## 2024-05-27 ASSESSMENT — PAIN - FUNCTIONAL ASSESSMENT: PAIN_FUNCTIONAL_ASSESSMENT: ACTIVITIES ARE NOT PREVENTED

## 2024-05-27 ASSESSMENT — PAIN DESCRIPTION - FREQUENCY: FREQUENCY: INTERMITTENT

## 2024-05-27 NOTE — FLOWSHEET NOTE
05/27/24 1820   Assessment   Charting Type Reassessment   Reassessment Performed Changes documented below   Psychosocial   Psychosocial (WDL) X   Patient Behaviors Anxious   Neurological   Neuro (WDL) X   Orientation Level Oriented/disoriented at times   Cardiac Monitor   Telemetry Box Number [id number]   Anti-Embolism   Anti-Embolism Intervention Medication  (heparin subq)   Anti-Embolism Devices Other (Comment)  (heparin subq)     Pt becoming disoriented. Anxious. MD made aware; orders for zyprexia PRN. Family on phone with patient.    Briseida Harper RN

## 2024-05-27 NOTE — PROGRESS NOTES
Occupational Therapy    [facility] - Rehabilitation Department       Occupational Therapy  3202/3202-01  [facility] 3 PROGRESSIVE CARE    [x] Initial Evaluation            [x] Daily Treatment Note         [] Discharge Summary      Patient: Lalo Moulton   : 1935   MRN: [id number]   Date of Service:  2024    Admitting Diagnosis:  Atrial fibrillation with rapid ventricular response (HCC)  Referring Physician: Eduard Sow MD   Current Admission Summary: Per Dr. Chapa's note : \"Hospital Day: 1     Assessment and Plan:   Lalo Moulton is a 88 y.o.  male with past medical history of hypertension, chronic HFpEF, hyperlipidemia, CKD stage III, GERD, hypothyroidism, visually impaired/legally blind, came to the emergency room complaining of shortness of breath/dyspnea on exertion since past 7 days.     Upon arrival to emergency room, patient was noted to be in A-fib with RVR.  proBNP elevated at 20,083, troponin 58, 57.  Creatinine elevated at 2.5, bicarb 19, hemoglobin/hematocrit 13.1/39.5, platelet 213.     X-ray chest  1. Mild bilateral basilar airspace disease and small pleural effusions      Echo 2024  A bubble study was performed.   Left ventricular wall motion is normal.   Overall left ventricular ejection fraction is estimated to be 55-60%.   Diastolic function is impaired and classified as Grade 1 (impaired   relaxation).   Mitral valve leaflets are mildly calcified in appearance.   Aortic valve is mildly calcified.   Injection of agitated saline showed no interatrial shunt.   Right ventricular systolic pressure is moderately elevated at 45-55 mmHg.   No significant valvular abnormalities identified.   There is no obvious cardiac source of emboli noted.      Assessment     Atrial fibrillation with RVR-new onset  Acute on chronic HFpEF exacerbation  EFREN on CKD stage III  Elevated troponin-unlikely ACS.  Community-acquired bacterial pneumonia  S/p    Patient Disposition at start of session:  spoke with RN, patient in bed, bed alarm in place, family/caregiver present, and call light within reach    Pain: 3/10.  Location: R neck  Pain Interventions: patient denies pain interventions Patient reports pain is tolerable for therapy.       Vitals:  Pt Position BP (mmHg) HR (bpm) SpO2 (%) on RA.  Comments   Supine in bed 108/72 62 97    Seated /98   Reported dizziness, subsided after 3 mins   Seated EOB after mobility 113/90 75 99 Reported dizziness, subsided after ~1 min   Seated in chair 127/111 103  RN aware     Activity Tolerance : Tolerated tx well    Activities of Daily Living  Basic Activities of Daily Living  Feeding: setup assistance, to locate drink  Pt declined further ADLs  Instrumental Activities of Daily Living  No IADL completed on this date.    Functional Mobility  Bed Mobility:  Supine to Sit: supervision  Comments:  Transfers:  Sit to stand transfer:CGA, HHA  Stand to sit transfer: CGA, HHA x2  Bed / Chair transfer: minimal assistance.  x2  Bed / Chair comments: HHA, initially requiring Ax2, able to complete ~6ft of distance with x1 HHA   Comments:  Functional Mobility  Functional Mobility Activity: bed to chair  Required Assistance: minimal assistance  Comment: HHA, Ax2  Balance:  Static Sitting Balance: good: independent with functional balance in unsupported position  Static Standing Balance: fair (-): maintains balance at CGA with use of UE support  Comments:      Other Therapeutic Interventions:  declined further activities    __________________________________________________________________________________     Education  Barriers To Learning: cognition and visual  Patient Education: patient educated on goals, OT role and benefits, plan of care, ADL adaptive strategies, family education, low vision education, disease specific education, transfer training  Learning Assessment:  patient verbalizes and demonstrates understanding, patient

## 2024-05-27 NOTE — CONSULTS
[facility]  Cardiology Note  [phone]      Chief Complaint   Patient presents with    Shortness of Breath     SOB and HA for several days. Multiple other complaints.        History of Present Illness:  Lalo Moulton is a 88 y.o. patient PMHx HTN, CKD, HLD, hypothyroid, legal blindness who presented to the hospital with complaints of LUIS x 7 days. Patient was in AF with RVR in the ED and labwork demonstrated elevated troponin and BNP. He was treated with IV lasix and a diltiazem gtt with improvement in HR. I/O documentation demonstrates -400ml overnight.     Admitted to  for fall, AMS and possible AF 1/2024. Underwent TTE, CVA R/O, not discharged with monitor or on AC.     Patient recalls being dizzy and short of breath prior to hospitalization but is not able to provide further details. He states these symptoms are improved when getting up to use the restroom this morning.     He has been intermittently in AF and NSR with frequent PACs and PVCs    Past Medical History:   has a past medical history of Alcohol dependence, in remission, BPH (benign prostatic hyperplasia), Calculus of gallbladder without mention of cholecystitis or obstruction, Calculus of kidney, Cataract, Colon polyp, Diverticulosis, GERD (gastroesophageal reflux disease), Head injury, Hemorrhoid, Hyperlipidemia, Hypertension, Hypertensive kidney disease, malignant, Hypothyroid, Macular degeneration, Migraine, and Open-angle glaucoma.    Surgical History:   has a past surgical history that includes Cholecystectomy (4/2004); hip surgery (1/1991); Ureter stent placement (10/26/2006); Eye removal (1/1991); Mandible fracture surgery (1/1991); Cataract removal with implant (Right, 1/28/15); and Tonsillectomy.     Social History:   reports that he quit smoking about 43 years ago. His smoking use included cigarettes. He has never used smokeless tobacco. He reports that he does not drink alcohol and does not use drugs.     Family

## 2024-05-27 NOTE — PLAN OF CARE
Problem: Discharge Planning  Goal: Discharge to home or other facility with appropriate resources  Outcome: Progressing     Problem: Pain  Goal: Verbalizes/displays adequate comfort level or baseline comfort level  Outcome: Progressing     Problem: Safety - Adult  Goal: Free from fall injury  Outcome: Progressing     Problem: ABCDS Injury Assessment  Goal: Absence of physical injury  Outcome: Progressing     Problem: Skin/Tissue Integrity  Goal: Absence of new skin breakdown  Description: 1.  Monitor for areas of redness and/or skin breakdown  2.  Assess vascular access sites hourly  3.  Every 4-6 hours minimum:  Change oxygen saturation probe site  4.  Every 4-6 hours:  If on nasal continuous positive airway pressure, respiratory therapy assess nares and determine need for appliance change or resting period.  Outcome: Progressing     Problem: Cardiovascular - Adult  Goal: Maintains optimal cardiac output and hemodynamic stability  Outcome: Progressing  Goal: Absence of cardiac dysrhythmias or at baseline  Outcome: Progressing     Problem: Skin/Tissue Integrity - Adult  Goal: Skin integrity remains intact  Outcome: Progressing     Problem: Musculoskeletal - Adult  Goal: Return ADL status to a safe level of function  Outcome: Progressing     Problem: Infection - Adult  Goal: Absence of infection at discharge  Outcome: Progressing

## 2024-05-27 NOTE — PROGRESS NOTES
Hospitalist Progress Note      PCP: Yanira Melo MD    Date of Admission: 5/26/2024    Chief Complaint: shortness of breath    Hospital Course: Lalo Moulton is a 88 y.o.  male with past medical history of hypertension, chronic HFpEF, hyperlipidemia, CKD stage III, GERD, hypothyroidism, visually impaired/legally blind, came to the emergency room complaining of shortness of breath/dyspnea on exertion since past 7 days.  Reports mild productive cough.  Denies any fever, chills, chest pain, abdominal pain, nausea, vomiting, diarrhea or constipation.  Patient was last admitted at [facility] in January 2024 with confusion.  Briefly noted to have A-fib with RVR which spontaneously resolved.  Not on anticoagulation. Pt reported intermittent falls.     Subjective: Pt ambulated with 2 person assist to bathroom, tremulous. Pt having diarrhea x 2 weeks. Pt denies sob cp palpitations at this time. Assisted back to bed. Pt is blind. Reviewed with pt how to use call light, pt demonstrated correct use. Oriented pt to where belongings were located in the room, near him. He verbalized satisfaction. Reviewed POC. Daughter at bedside, denied questions.     Assessment/Plan:    Afib RVR  Elevated troponin  -new onset  -tele, HR 80s   -limited echo ordered  -cardiology consulted, when cardiology evaluated, pt had spontaneously converted to NSR with PACs and PVCs. Stop dilt gtt. Started lopressor 25mg po bid. Lasix 40mg iv bid. Due to falls, risk of AC outweigh benefits at this time. Troponin elevation - not c/w ACS due to AF, HF     Acute on Chronic HFpEF exacerbation  -bnp 20,000  -limited echo ordered  -lasix increased to 40mg IV bid  -on RA  -accurate I&O, cardiac low salt diet  -daily weights: admit weight 61.7kg, need today's weight     EFREN in CKD IIIA  -admission crt 2.5, baseline 1.6, today 2.1  -nephrology consulted, agreed with IV lasix  -avoid nephrotoxin     CAP  -continue rocephin and  auscultation, bilaterally without Rales/Wheezes/Rhonchi.  Cardiovascular: Regular rate and irreg-rhythm with normal S1/S2 without murmurs, rubs or gallops.  Abdomen: Soft, non-tender, non-distended with normal bowel sounds.  Musculoskeletal: No clubbing, cyanosis or edema bilaterally.  Full range of motion without deformity.  Skin: Skin color, texture, turgor normal.  No rashes or lesions.  Neurologic:  Neurovascularly intact without any focal sensory/motor deficits. Cranial nerves: II-XII intact, grossly non-focal.  Psychiatric: Alert and oriented, thought content appropriate, normal insight  Capillary Refill: Brisk,< 3 seconds   Peripheral Pulses: +2 palpable, equal bilaterally       Labs:   Recent Labs     05/26/24  1038 05/27/24 0437   WBC 6.6 4.6   HGB 13.1* 12.8*   HCT 39.5* 38.2*    191     Recent Labs     05/26/24  1038 05/27/24 0437    139   K 3.9 3.7    108   CO2 19* 17*   BUN 26* 24*   CREATININE 2.5* 2.1*   CALCIUM 8.4 8.1*     Recent Labs     05/26/24  1038 05/27/24  0437   AST 17 14*   ALT 6* <5*   BILITOT 0.7 0.6   ALKPHOS 109 106     No results for input(s): \"INR\" in the last 72 hours.  No results for input(s): \"CKTOTAL\", \"TROPONINI\" in the last 72 hours.    Urinalysis:      Lab Results   Component Value Date/Time    NITRU NEGATIVE 05/27/2024 03:43 AM    WBCUA 0 TO 5 05/27/2024 03:43 AM    RBCUA 0 TO 2 05/27/2024 03:43 AM    BLOODU neg 10/03/2014 12:00 AM    BLOODU NEGATIVE 03/21/2011 10:09 AM    SPECGRAV 1.015 10/03/2014 12:00 AM    GLUCOSEU NEGATIVE 05/27/2024 03:43 AM    GLUCOSEU NEGATIVE 03/21/2011 10:09 AM       Radiology:  XR CHEST (2 VW)   Final Result      1. Mild bilateral basilar airspace disease and small pleural effusions.      Electronically signed by Sumeet Montiel MD              DVT Prophylaxis: heparin  Diet: ADULT DIET; Regular  ADULT ORAL NUTRITION SUPPLEMENT; Breakfast, Lunch, Dinner; Standard High Calorie/High Protein Oral Supplement  Code Status:

## 2024-05-27 NOTE — FLOWSHEET NOTE
05/27/24 0804   Vital Signs   Pulse 78   /64   MAP (Calculated) 87   Pain Assessment   Pain Assessment 0-10   Pain Level 0   Oxygen Therapy   SpO2 95 %   O2 Device None (Room air)   O2 Flow Rate (L/min) 0 L/min       Pt assessment complete; see flow sheets. Vitals completed. No s/s of distress. Cardiology in to see Pt. Orders for metoprolol PO; given at this time with other morning meds and to stop cardizem gtt in an hour. Assisted to bathroom this morning. Pt denies any other care needs at this time. Pt stable.    Briseida Harper RN

## 2024-05-27 NOTE — PROGRESS NOTES
[facility] - Rehabilitation Department      Physical Therapy  3202/3202-01  [facility] 3 PROGRESSIVE CARE    [x] Initial Evaluation            [x] Daily Treatment Note         [] Discharge Summary      Patient: Lalo Moulton   : 1935   MRN: [id number]   Date of Service:  2024   Admitting Diagnosis: Atrial fibrillation with rapid ventricular response (HCC)  Ordering Physician: Dr. Sow  Current Admission Summary: Lalo Moulton is a 88 y.o. male with past medical history of hypertension, chronic HFpEF, hyperlipidemia, CKD stage III, GERD, hypothyroidism, visually impaired/legally blind, came to the emergency room complaining of shortness of breath/dyspnea on exertion since past 7 days.  Pt diagnosed with afib with RVR, CHF.  Past Medical History:  has a past medical history of Alcohol dependence, in remission, BPH (benign prostatic hyperplasia), Calculus of gallbladder without mention of cholecystitis or obstruction, Calculus of kidney, Cataract, Colon polyp, Diverticulosis, GERD (gastroesophageal reflux disease), Head injury, Hemorrhoid, Hyperlipidemia, Hypertension, Hypertensive kidney disease, malignant, Hypothyroid, Macular degeneration, Migraine, and Open-angle glaucoma.  Past Surgical History:  has a past surgical history that includes Cholecystectomy (2004); hip surgery (1991); Ureter stent placement (10/26/2006); Eye removal (1991); Mandible fracture surgery (1991); Cataract removal with implant (Right, 1/28/15); and Tonsillectomy.  ______________________________________________________________________________________________________________________________________________  Discharge Recommendations: SNF  AMPAC Raw Score:   AM-PAC Inpatient Mobility Raw Score : 17            Therapy discharge recommendations take into account each patient's current medical complexities and are subject to input/oversight from the patient's healthcare team.   Barriers to Home Discharge:   [] Steps to  access home entry or bed/bath:   [] Unable to transfer, ambulate, or propel wheelchair household distances without assist   [x] Limited available assist at home upon discharge    [] Patient or family requests d/c to post-acute facility    [] Poor cognition/safety awareness for d/c to home alone    []Unable to maintain ordered weight bearing status    [] Patient with salient signs of long-standing immobility   [x] Patient is at risk for falls due to: Blindness and dizziness   [] Other:  If pt is unable to be seen after this session, please let this note serve as discharge summary.  Please see case management note for discharge disposition.  Thank you.    DME Required For Discharge:   kmotd/cequipment: DME to be determined at next level of care    Assessment/Clinical Assessment:   Pt seen today for physical therapy Evaluation & Treatment.   Pt demonstrated decreased Activity tolerance, Functional Mobility , Balance, and Safetyas well as decreased independence with Ambulation and Transfers.   Recommending SNF upon discharge as patient functioning well below baseline, demonstrates good rehab potential and unable to return home due to limited or no family support and home environment not conducive to patient recovery.     Activity Tolerance:See below.   Impairments Requiring Therapeutic Intervention: decreased functional mobility, decreased balance  Rehab Potential: good  ___________________________________________________________________________________________________________________________________________  Precautions/Restrictions: no restrictions      Pre-Admission Information   Lives With: alone       Type of Home: condo  Home Layout: one level  Home Access:  2 step to enter without rails   Bathroom Layout: tub/shower unit  Bathroom Equipment: grab bars in shower  Toilet Height: standard height  Home Equipment: single point cane  Transfer Assistance: modified independent with use of cane  Ambulation

## 2024-05-27 NOTE — PLAN OF CARE
Problem: Discharge Planning  Goal: Discharge to home or other facility with appropriate resources  5/27/2024 1942 by Tawanda Person, RN  Outcome: Progressing  Flowsheets (Taken 5/27/2024 1942)  Discharge to home or other facility with appropriate resources:   Identify barriers to discharge with patient and caregiver   Identify discharge learning needs (meds, wound care, etc)   Refer to discharge planning if patient needs post-hospital services based on physician order or complex needs related to functional status, cognitive ability or social support system   Arrange for needed discharge resources and transportation as appropriate     Problem: Pain  Goal: Verbalizes/displays adequate comfort level or baseline comfort level  5/27/2024 1942 by Tawanda Person, RN  Outcome: Progressing  Flowsheets (Taken 5/27/2024 1942)  Verbalizes/displays adequate comfort level or baseline comfort level:   Encourage patient to monitor pain and request assistance   Administer analgesics based on type and severity of pain and evaluate response   Consider cultural and social influences on pain and pain management   Assess pain using appropriate pain scale   Implement non-pharmacological measures as appropriate and evaluate response   Notify Licensed Independent Practitioner if interventions unsuccessful or patient reports new pain     Problem: Safety - Adult  Goal: Free from fall injury  5/27/2024 1942 by Tawanda Person, RN  Outcome: Progressing  Flowsheets (Taken 5/27/2024 1942)  Free From Fall Injury:   Instruct family/caregiver on patient safety   Based on caregiver fall risk screen, instruct family/caregiver to ask for assistance with transferring infant if caregiver noted to have fall risk factors     Problem: ABCDS Injury Assessment  Goal: Absence of physical injury  5/27/2024 1942 by Tawanda Person, RN  Outcome: Progressing  Flowsheets (Taken 5/27/2024 1942)  Absence of Physical Injury: Implement safety measures based on  patient assessment     Problem: Cardiovascular - Adult  Goal: Maintains optimal cardiac output and hemodynamic stability  5/27/2024 1942 by Tawanda Person, RN  Outcome: Progressing  Flowsheets (Taken 5/27/2024 1942)  Maintains optimal cardiac output and hemodynamic stability:   Monitor blood pressure and heart rate   Monitor urine output and notify Licensed Independent Practitioner for values outside of normal range   Assess for signs of decreased cardiac output   Administer fluid and/or volume expanders as ordered   Administer vasoactive medications as ordered     Problem: Cardiovascular - Adult  Goal: Absence of cardiac dysrhythmias or at baseline  5/27/2024 1942 by Tawanda Person, RN  Outcome: Progressing  Flowsheets (Taken 5/27/2024 1942)  Absence of cardiac dysrhythmias or at baseline:   Monitor cardiac rate and rhythm   Administer antiarrhythmia medication and electrolyte replacement as ordered   Assess for signs of decreased cardiac output

## 2024-05-27 NOTE — PROGRESS NOTES
05/27/24 1351   Encounter Summary   Encounter Overview/Reason Attempted Encounter   Service Provided For Patient not available   Referral/Consult From Rounding   Last Encounter  05/27/24  (Patient was sleeping/CK)

## 2024-05-27 NOTE — PROGRESS NOTES
Comprehensive Nutrition Assessment    Type and Reason for Visit:  Positive Nutrition Screen    Nutrition Recommendations/Plan:   Liberalize diet to encourage PO intakes.  Start Ensure TID.  Attempt standing wt if able.     Malnutrition Assessment:  Malnutrition Status:  Severe malnutrition (05/27/24 0918)    Context:  Chronic Illness     Findings of the 6 clinical characteristics of malnutrition:  Energy Intake:  Mild decrease in energy intake (Comment)  Weight Loss:  Unable to assess     Body Fat Loss:  Severe body fat loss Orbital, Buccal region   Muscle Mass Loss:  Severe muscle mass loss Temples (temporalis), Calf (gastrocnemius)  Fluid Accumulation:  Mild Extremities   Strength:  Not Performed    Nutrition Assessment:    + nutrition screen. Pt with PMH of GERD, HLD, HTN, and CKD3, who presented with SOB And headache. Pt sleeping at time of visit, visual NFPE completed and chart reviewed. Pt had reported decreased appetite. Noted pt with Mod/severe fat and muscle wasting. No wt loss noted but limited wt hx. Breakfast tray at beside untouched. Will provide start ONS and liberalize diet to encourage PO intakes.    Nutrition Related Findings:    +2 BLE edema; Wound Type: None       Current Nutrition Intake & Therapies:    Average Meal Intake: Unable to assess, 0% (no intakes noted)  Average Supplements Intake: None Ordered  ADULT DIET; Regular; Low Fat/Low Chol/High Fiber/2 gm Na    Anthropometric Measures:  Height: 162.6 cm (5' 4\")  Ideal Body Weight (IBW): 130 lbs (59 kg)       Current Body Weight: 61.7 kg (136 lb 0.4 oz), 104.6 % IBW. Weight Source: Bed Scale  Current BMI (kg/m2): 23.3                          BMI Categories: Normal Weight (BMI 18.5-24.9)    Estimated Daily Nutrient Needs:  Energy Requirements Based On: Kcal/kg  Weight Used for Energy Requirements: Current  Energy (kcal/day): 1955-4602 kcal (25-30 kcal/kg 62 kg CBW)  Weight Used for Protein Requirements: Current  Protein (g/day): 62-74 gm

## 2024-05-27 NOTE — PROGRESS NOTES
Patient is A/Ox4, very pleasant. VS: /69   Pulse 98   Temp 98.2 °F (36.8 °C) (Axillary)   Resp 17   Ht 1.626 m (5' 4\")   Wt 61.7 kg (136 lb)   SpO2 96%   BMI 23.34 kg/m² . Cardizem gtt infusing at 15mg/hr. Tele strips saved to chart. Rhythm remains irregular, rate has became more controlled, 80's-110's.  Patient was reminded to and assisted with turning in bed overnight; pt was able to use urinal with assistance.   Patient is resting comfortably in bed, call light and cell phone in reach. Bed alarm activated and yellow sock on patient. Denies further needs at this time.    Electronically signed by Sophie Pope RN on 5/27/2024 at 5:33 AM      Aspirin Enteric Coated 81 mg oral delayed release tablet: 1 tab(s) orally once a day, last dose 11/7/21  Combigan 0.2%-0.5% ophthalmic solution: 1 drop(s) to each affected eye every 12 hours  dorzolamide 2% ophthalmic solution: 1 drop(s) in each eye 2 times a day  Fish Oil oral capsule: 1 tab(s) orally once a day-last dose 11/01/21  Lipitor 40 mg oral tablet: 1 tab(s) orally once a day  losartan 25 mg oral tablet: 1 tab(s) orally once a day  tamsulosin 0.4 mg oral capsule: 1 cap(s) orally once a day  Travatan 0.004% ophthalmic solution: 1 drop(s) in each eye once a day (in the evening)   amLODIPine 5 mg oral tablet: 1 tab(s) orally once a day  Aspirin Enteric Coated 81 mg oral delayed release tablet: 1 tab(s) orally once a day, last dose 11/7/21  Combigan 0.2%-0.5% ophthalmic solution: 1 drop(s) to each affected eye every 12 hours  dorzolamide 2% ophthalmic solution: 1 drop(s) in each eye 2 times a day  Fish Oil oral capsule: 1 tab(s) orally once a day-last dose 11/01/21  Lipitor 40 mg oral tablet: 1 tab(s) orally once a day  losartan 25 mg oral tablet: 1 tab(s) orally once a day  QUEtiapine 25 mg oral tablet: 1 tab(s) orally once a day (at bedtime)  QUEtiapine 25 mg oral tablet: 0.5 tab(s) orally once a day   tamsulosin 0.4 mg oral capsule: 1 cap(s) orally once a day  Travatan 0.004% ophthalmic solution: 1 drop(s) in each eye once a day (in the evening)   amLODIPine 5 mg oral tablet: 1 tab(s) orally once a day  Combigan 0.2%-0.5% ophthalmic solution: 1 drop(s) to each affected eye every 12 hours  dorzolamide 2% ophthalmic solution: 1 drop(s) in each eye 2 times a day  Fish Oil oral capsule: 1 tab(s) orally once a day-last dose 11/01/21  Lipitor 40 mg oral tablet: 1 tab(s) orally once a day  QUEtiapine 25 mg oral tablet: 1 tab(s) orally once a day (at bedtime)  QUEtiapine 25 mg oral tablet: 0.5 tab(s) orally once a day   tamsulosin 0.4 mg oral capsule: 1 cap(s) orally once a day  Travatan 0.004% ophthalmic solution: 1 drop(s) in each eye once a day (in the evening)

## 2024-05-27 NOTE — PROGRESS NOTES
Bedside report and transfer of care given to ANCA Mendez. Pt currently resting in bed with the call light within reach. Pt denies any other care needs at this time. Pt stable at this time.        Briseida Harper RN

## 2024-05-27 NOTE — PLAN OF CARE
Problem: Discharge Planning  Goal: Discharge to home or other facility with appropriate resources  5/27/2024 1203 by Briseida Harper RN  Outcome: Progressing  5/27/2024 0116 by Sophie Pope RN  Outcome: Progressing     Problem: Pain  Goal: Verbalizes/displays adequate comfort level or baseline comfort level  5/27/2024 1203 by Briseida Harper RN  Outcome: Progressing  5/27/2024 0116 by Sophie Pope RN  Outcome: Progressing     Problem: Safety - Adult  Goal: Free from fall injury  5/27/2024 1203 by Briseida Harper RN  Outcome: Progressing  5/27/2024 0116 by Sophie Pope RN  Outcome: Progressing     Problem: ABCDS Injury Assessment  Goal: Absence of physical injury  5/27/2024 1203 by Briseida Harper RN  Outcome: Progressing  5/27/2024 0116 by Sophie Pope RN  Outcome: Progressing     Problem: Skin/Tissue Integrity  Goal: Absence of new skin breakdown  Description: 1.  Monitor for areas of redness and/or skin breakdown  2.  Assess vascular access sites hourly  3.  Every 4-6 hours minimum:  Change oxygen saturation probe site  4.  Every 4-6 hours:  If on nasal continuous positive airway pressure, respiratory therapy assess nares and determine need for appliance change or resting period.  5/27/2024 1203 by Briseida Harper RN  Outcome: Progressing  5/27/2024 0116 by Sophie oPpe RN  Outcome: Progressing     Problem: Musculoskeletal - Adult  Goal: Return ADL status to a safe level of function  5/27/2024 1203 by Briseida Harper RN  Outcome: Progressing  5/27/2024 0116 by Sophie Pope, RN  Outcome: Progressing     Problem: Skin/Tissue Integrity - Adult  Goal: Skin integrity remains intact  5/27/2024 1203 by Briseida Harper RN  Outcome: Progressing  5/27/2024 0116 by Sophie Pope, RN  Outcome: Progressing     Problem: Infection - Adult  Goal: Absence of infection at discharge  5/27/2024 1203 by Briseida Harper RN  Outcome: Progressing  5/27/2024 0116 by Sophie Pope, RN  Outcome: Progressing     Problem: Nutrition

## 2024-05-27 NOTE — CONSULTS
Nephrology Consult Note                                                                                                                                                                                                                                                                                                                                                               Office : [phone]     Fax :[phone]    Patient's Name: Lalo Moulton  8:05 AM  5/27/2024    Reason for Consult:  EFREN   Requesting Physician:  Yanira Melo MD  Chief Complaint:    Chief Complaint   Patient presents with    Shortness of Breath     SOB and HA for several days. Multiple other complaints.       Assessment/Plan     # EFREN 2/2 CRS  Admitted with CHF failure 2/2 AF   Volume overload   Cr better w/ diuresis.   UOP not optimal   Plan:  Agree with lasix 40 mg bid I.v   2. Strict I/O, monitor BMP daily   3. Avoid nephrotoxins     # HTN  Controlled   On BB , lasix   No meds changes    # Anemia    # Acid- base/ Electrolyte imbalance   Metabolic acidosis 2/2 EFREN   Plan:  sodium BC from tomorrow (once UOP and volume better controlled    # CKD stage IIIA  Base line Cr 1.5   Monitor BMP     # AF-RVR  Converted to sinus   Now on BB  Monitor HR  Cardiology consulted     History of Present Ilness:    Lalo Moulton is a 88 y.o. male with   PMH of hypertension, chronic HFpEF, hyperlipidemia, CKD stage III, GERD, hypothyroidism, visually impaired/legally blind,admitted for SOB. Pt was found to have:   Leg edema , Elevated BP , AF-RVR    BNP 20 k   Cr 2.5<--1.5  Today Cr down to 2.1  BC low 17   Hb 12.8   BP controlled on r.a      Interval hx     Past Medical History:   Diagnosis Date    Alcohol dependence, in remission     BPH (benign prostatic hyperplasia) 6/12/2011    Patient was evaluated by Dr Lim and recommended to undergo a TURP after having urodynamics done in his office. Patient no longer wants to follow with Dr Lim and wishes  injection 4 mg, Q6H PRN  polyethylene glycol (GLYCOLAX) packet 17 g, Daily PRN  acetaminophen (TYLENOL) tablet 650 mg, Q6H PRN   Or  acetaminophen (TYLENOL) suppository 650 mg, Q6H PRN  heparin (porcine) injection 5,000 Units, 3 times per day  perflutren lipid microspheres (DEFINITY) injection 1.5 mL, ONCE PRN  cefTRIAXone (ROCEPHIN) 1,000 mg in sterile water 10 mL IV syringe, Q24H  azithromycin (ZITHROMAX) 500 mg in sodium chloride 0.9 % 250 mL IVPB ([id number]), Q24H        Review of Systems:   14 point ROS obtained but were negative except mentioned in HPI      Physical exam:     Vitals:  /64   Pulse 86   Temp 97.7 °F (36.5 °C) (Oral)   Resp 16   Ht 1.626 m (5' 4\")   Wt 61.7 kg (136 lb)   SpO2 96%   BMI 23.34 kg/m²   Constitutional:  OAA X3 NAD Yes  Skin: no rash, turgor wnl  Heent:  eomi, mmm  Neck: no bruits or jvd noted  Cardiovascular:  S1, S2 without m/r/g  Respiratory: course rales diffuse   Abdomen:  , soft, nt, nd  Ext:  lower extremity edema Yes  Psychiatric: mood and affect kirstin   Musculoskeletal:  Rom, muscular strength intact    Data:   Labs:  CBC:   Recent Labs     05/26/24  1038 05/27/24 0437   WBC 6.6 4.6   HGB 13.1* 12.8*    191     BMP:    Recent Labs     05/26/24  1038 05/27/24 0437    139   K 3.9 3.7    108   CO2 19* 17*   BUN 26* 24*   CREATININE 2.5* 2.1*   GLUCOSE 93 76     Ca/Mg/Phos:   Recent Labs     05/26/24  1038 05/27/24 0437   CALCIUM 8.4 8.1*   MG 1.8  --      Hepatic:   Recent Labs     05/26/24  1038 05/27/24 0437   AST 17 14*   ALT 6* <5*   BILITOT 0.7 0.6   ALKPHOS 109 106     Troponin: No results for input(s): \"TROPONINI\" in the last 72 hours.  BNP: No results for input(s): \"BNP\" in the last 72 hours.  Lipids: No results for input(s): \"CHOL\", \"TRIG\", \"HDL\" in the last 72 hours.    Invalid input(s): \"LDLCALC\", \"LABVLDL\"  ABGs: No results for input(s): \"PHART\", \"PO2ART\", \"[id number]\" in the last 72 hours.  INR: No results for input(s): \"INR\" in the

## 2024-05-27 NOTE — FLOWSHEET NOTE
05/27/24 1154   Vital Signs   Temp 97.9 °F (36.6 °C)   Temp Source Oral   Pulse 60   Heart Rate Source Monitor   Respirations 16   /77   MAP (Calculated) 91   MAP (mmHg) 91   BP Location Left upper arm   BP Method Automatic   Patient Position Up in chair   Pain Assessment   Pain Assessment 0-10   Pain Level 0   Oxygen Therapy   SpO2 97 %   O2 Device None (Room air)   O2 Flow Rate (L/min) 0 L/min       Reassessment completed; no significant changes.   Briseida Harper RN

## 2024-05-28 ENCOUNTER — APPOINTMENT (OUTPATIENT)
Age: 89
DRG: 291 | End: 2024-05-28
Payer: MEDICARE

## 2024-05-28 ENCOUNTER — APPOINTMENT (OUTPATIENT)
Age: 89
DRG: 291 | End: 2024-05-28
Attending: INTERNAL MEDICINE
Payer: MEDICARE

## 2024-05-28 LAB
ANION GAP SERPL CALCULATED.3IONS-SCNC: 16 MMOL/L (ref 3–16)
BASOPHILS # BLD: 0.01 K/UL (ref 0–0.2)
BASOPHILS NFR BLD: 0 %
BNP SERPL-MCNC: ABNORMAL PG/ML (ref 0–449)
BUN SERPL-MCNC: 26 MG/DL (ref 7–20)
CALCIUM SERPL-MCNC: 8.4 MG/DL (ref 8.3–10.6)
CHLORIDE SERPL-SCNC: 104 MMOL/L (ref 99–110)
CO2 SERPL-SCNC: 20 MMOL/L (ref 21–32)
CREAT SERPL-MCNC: 2.4 MG/DL (ref 0.8–1.3)
ECHO AV AREA PEAK VELOCITY: 1.8 CM2
ECHO AV AREA VTI: 1.6 CM2
ECHO AV AREA/BSA PEAK VELOCITY: 1 CM2/M2
ECHO AV AREA/BSA VTI: 0.9 CM2/M2
ECHO AV MEAN GRADIENT: 5 MMHG
ECHO AV MEAN VELOCITY: 1 M/S
ECHO AV PEAK GRADIENT: 9 MMHG
ECHO AV PEAK VELOCITY: 1.5 M/S
ECHO AV VELOCITY RATIO: 0.8
ECHO AV VTI: 24.6 CM
ECHO BSA: 1.84 M2
ECHO EST RA PRESSURE: 3 MMHG
ECHO LA AREA 2C: 13.4 CM2
ECHO LA AREA 4C: 15.6 CM2
ECHO LA MAJOR AXIS: 5 CM
ECHO LA MINOR AXIS: 4.2 CM
ECHO LA VOL BP: 40 ML (ref 18–58)
ECHO LA VOL MOD A2C: 35 ML (ref 18–58)
ECHO LA VOL MOD A4C: 40 ML (ref 18–58)
ECHO LA VOL/BSA BIPLANE: 22 ML/M2 (ref 16–34)
ECHO LA VOLUME INDEX MOD A2C: 19 ML/M2 (ref 16–34)
ECHO LA VOLUME INDEX MOD A4C: 22 ML/M2 (ref 16–34)
ECHO LV E' LATERAL VELOCITY: 11 CM/S
ECHO LV E' SEPTAL VELOCITY: 4 CM/S
ECHO LV FRACTIONAL SHORTENING: 19 % (ref 28–44)
ECHO LV INTERNAL DIMENSION DIASTOLE INDEX: 2.61 CM/M2
ECHO LV INTERNAL DIMENSION DIASTOLIC: 4.7 CM (ref 4.2–5.9)
ECHO LV INTERNAL DIMENSION SYSTOLIC INDEX: 2.11 CM/M2
ECHO LV INTERNAL DIMENSION SYSTOLIC: 3.8 CM
ECHO LV IVSD: 1.1 CM (ref 0.6–1)
ECHO LV MASS 2D: 175.8 G (ref 88–224)
ECHO LV MASS INDEX 2D: 97.7 G/M2 (ref 49–115)
ECHO LV POSTERIOR WALL DIASTOLIC: 1 CM (ref 0.6–1)
ECHO LV RELATIVE WALL THICKNESS RATIO: 0.43
ECHO LVOT AREA: 2.3 CM2
ECHO LVOT AV VTI INDEX: 0.72
ECHO LVOT DIAM: 1.7 CM
ECHO LVOT MEAN GRADIENT: 3 MMHG
ECHO LVOT PEAK GRADIENT: 5 MMHG
ECHO LVOT PEAK VELOCITY: 1.2 M/S
ECHO LVOT STROKE VOLUME INDEX: 22.2 ML/M2
ECHO LVOT SV: 39.9 ML
ECHO LVOT VTI: 17.6 CM
ECHO MV AREA VTI: 1.5 CM2
ECHO MV E DECELERATION TIME (DT): 132 MS
ECHO MV E VELOCITY: 0.52 M/S
ECHO MV E/E' LATERAL: 4.73
ECHO MV E/E' RATIO (AVERAGED): 8.86
ECHO MV E/E' SEPTAL: 13
ECHO MV LVOT VTI INDEX: 1.52
ECHO MV MAX VELOCITY: 1 M/S
ECHO MV MEAN GRADIENT: 2 MMHG
ECHO MV MEAN VELOCITY: 0.6 M/S
ECHO MV PEAK GRADIENT: 4 MMHG
ECHO MV REGURGITANT PEAK GRADIENT: 104 MMHG
ECHO MV REGURGITANT PEAK VELOCITY: 5.1 M/S
ECHO MV REGURGITANT VTIA: 197 CM
ECHO MV VTI: 26.8 CM
ECHO RIGHT VENTRICULAR SYSTOLIC PRESSURE (RVSP): 52 MMHG
ECHO RV FREE WALL PEAK S': 12 CM/S
ECHO RV TAPSE: 1.6 CM (ref 1.7–?)
ECHO TV REGURGITANT MAX VELOCITY: 3.49 M/S
ECHO TV REGURGITANT PEAK GRADIENT: 49 MMHG
EOSINOPHIL # BLD: 0.1 K/UL (ref 0–0.6)
EOSINOPHILS RELATIVE PERCENT: 2 %
ERYTHROCYTE [DISTWIDTH] IN BLOOD BY AUTOMATED COUNT: 15.7 % (ref 12.4–15.4)
GFR, ESTIMATED: 25 ML/MIN/1.73M2
GLUCOSE SERPL-MCNC: 91 MG/DL (ref 70–99)
HCT VFR BLD AUTO: 41.2 % (ref 40.5–52.5)
HGB BLD-MCNC: 13.4 G/DL (ref 13.5–17.5)
IMM GRANULOCYTES # BLD AUTO: 0.02 K/UL (ref 0–0.5)
IMM GRANULOCYTES NFR BLD: 0 %
LYMPHOCYTES NFR BLD: 0.45 K/UL (ref 1–5.1)
LYMPHOCYTES RELATIVE PERCENT: 9 %
MCH RBC QN AUTO: 30.7 PG (ref 26–34)
MCHC RBC AUTO-ENTMCNC: 32.5 G/DL (ref 31–36)
MCV RBC AUTO: 94.3 FL (ref 80–100)
MONOCYTES NFR BLD: 0.55 K/UL (ref 0–1.3)
MONOCYTES NFR BLD: 11 %
NEUTROPHILS NFR BLD: 78 %
NEUTS SEG NFR BLD: 4.01 K/UL (ref 1.7–7.7)
PLATELET # BLD AUTO: 216 K/UL (ref 135–450)
PMV BLD AUTO: 9.8 FL (ref 9.4–12.4)
POTASSIUM SERPL-SCNC: 3.8 MMOL/L (ref 3.5–5.1)
PROCALCITONIN SERPL-MCNC: 0.28 NG/ML (ref 0–0.15)
RBC # BLD AUTO: 4.37 M/UL (ref 4.2–5.9)
SODIUM SERPL-SCNC: 140 MMOL/L (ref 136–145)
WBC OTHER # BLD: 5.1 K/UL (ref 4–11)

## 2024-05-28 PROCEDURE — 6370000000 HC RX 637 (ALT 250 FOR IP): Performed by: INTERNAL MEDICINE

## 2024-05-28 PROCEDURE — 83880 ASSAY OF NATRIURETIC PEPTIDE: CPT

## 2024-05-28 PROCEDURE — 6370000000 HC RX 637 (ALT 250 FOR IP): Performed by: NURSE PRACTITIONER

## 2024-05-28 PROCEDURE — 92526 ORAL FUNCTION THERAPY: CPT

## 2024-05-28 PROCEDURE — 2580000003 HC RX 258: Performed by: INTERNAL MEDICINE

## 2024-05-28 PROCEDURE — 6360000002 HC RX W HCPCS: Performed by: INTERNAL MEDICINE

## 2024-05-28 PROCEDURE — 97112 NEUROMUSCULAR REEDUCATION: CPT

## 2024-05-28 PROCEDURE — 97110 THERAPEUTIC EXERCISES: CPT

## 2024-05-28 PROCEDURE — 99232 SBSQ HOSP IP/OBS MODERATE 35: CPT | Performed by: INTERNAL MEDICINE

## 2024-05-28 PROCEDURE — 85025 COMPLETE CBC W/AUTO DIFF WBC: CPT

## 2024-05-28 PROCEDURE — 97535 SELF CARE MNGMENT TRAINING: CPT

## 2024-05-28 PROCEDURE — 93306 TTE W/DOPPLER COMPLETE: CPT

## 2024-05-28 PROCEDURE — 93306 TTE W/DOPPLER COMPLETE: CPT | Performed by: INTERNAL MEDICINE

## 2024-05-28 PROCEDURE — 36415 COLL VENOUS BLD VENIPUNCTURE: CPT

## 2024-05-28 PROCEDURE — 84145 PROCALCITONIN (PCT): CPT

## 2024-05-28 PROCEDURE — 92610 EVALUATE SWALLOWING FUNCTION: CPT

## 2024-05-28 PROCEDURE — 2060000000 HC ICU INTERMEDIATE R&B

## 2024-05-28 PROCEDURE — 94640 AIRWAY INHALATION TREATMENT: CPT

## 2024-05-28 PROCEDURE — 80048 BASIC METABOLIC PNL TOTAL CA: CPT

## 2024-05-28 PROCEDURE — 71045 X-RAY EXAM CHEST 1 VIEW: CPT

## 2024-05-28 RX ORDER — ISOSORBIDE MONONITRATE 30 MG/1
15 TABLET, EXTENDED RELEASE ORAL DAILY
Status: DISCONTINUED | OUTPATIENT
Start: 2024-05-28 | End: 2024-05-31 | Stop reason: HOSPADM

## 2024-05-28 RX ORDER — WATER 10 ML/10ML
INJECTION INTRAMUSCULAR; INTRAVENOUS; SUBCUTANEOUS
Status: DISPENSED
Start: 2024-05-28 | End: 2024-05-29

## 2024-05-28 RX ORDER — IPRATROPIUM BROMIDE AND ALBUTEROL SULFATE 2.5; .5 MG/3ML; MG/3ML
1 SOLUTION RESPIRATORY (INHALATION)
Status: DISCONTINUED | OUTPATIENT
Start: 2024-05-28 | End: 2024-05-29

## 2024-05-28 RX ORDER — HYDRALAZINE HYDROCHLORIDE 10 MG/1
10 TABLET, FILM COATED ORAL EVERY 8 HOURS SCHEDULED
Status: DISCONTINUED | OUTPATIENT
Start: 2024-05-28 | End: 2024-05-30

## 2024-05-28 RX ORDER — IPRATROPIUM BROMIDE AND ALBUTEROL SULFATE 2.5; .5 MG/3ML; MG/3ML
1 SOLUTION RESPIRATORY (INHALATION)
Status: DISCONTINUED | OUTPATIENT
Start: 2024-05-28 | End: 2024-05-28

## 2024-05-28 RX ORDER — OLANZAPINE 10 MG/2ML
5 INJECTION, POWDER, FOR SOLUTION INTRAMUSCULAR ONCE
Status: COMPLETED | OUTPATIENT
Start: 2024-05-28 | End: 2024-05-28

## 2024-05-28 RX ORDER — QUETIAPINE FUMARATE 25 MG/1
12.5 TABLET, FILM COATED ORAL ONCE
Status: COMPLETED | OUTPATIENT
Start: 2024-05-28 | End: 2024-05-28

## 2024-05-28 RX ORDER — FUROSEMIDE 10 MG/ML
60 INJECTION INTRAMUSCULAR; INTRAVENOUS 2 TIMES DAILY
Status: DISCONTINUED | OUTPATIENT
Start: 2024-05-29 | End: 2024-05-31 | Stop reason: HOSPADM

## 2024-05-28 RX ADMIN — ISOSORBIDE MONONITRATE 15 MG: 30 TABLET, EXTENDED RELEASE ORAL at 12:36

## 2024-05-28 RX ADMIN — Medication 3 MG: at 20:49

## 2024-05-28 RX ADMIN — WATER 1000 MG: 1 INJECTION INTRAMUSCULAR; INTRAVENOUS; SUBCUTANEOUS at 12:20

## 2024-05-28 RX ADMIN — HEPARIN SODIUM 5000 UNITS: 5000 INJECTION INTRAVENOUS; SUBCUTANEOUS at 04:49

## 2024-05-28 RX ADMIN — IPRATROPIUM BROMIDE AND ALBUTEROL SULFATE 1 DOSE: 2.5; .5 SOLUTION RESPIRATORY (INHALATION) at 11:52

## 2024-05-28 RX ADMIN — IPRATROPIUM BROMIDE AND ALBUTEROL SULFATE 1 DOSE: 2.5; .5 SOLUTION RESPIRATORY (INHALATION) at 21:02

## 2024-05-28 RX ADMIN — LEVOTHYROXINE SODIUM 50 MCG: 25 TABLET ORAL at 09:48

## 2024-05-28 RX ADMIN — METOPROLOL TARTRATE 25 MG: 25 TABLET, FILM COATED ORAL at 20:47

## 2024-05-28 RX ADMIN — Medication 10 ML: at 20:51

## 2024-05-28 RX ADMIN — ATORVASTATIN CALCIUM 20 MG: 20 TABLET, FILM COATED ORAL at 09:48

## 2024-05-28 RX ADMIN — FUROSEMIDE 40 MG: 10 INJECTION, SOLUTION INTRAMUSCULAR; INTRAVENOUS at 17:11

## 2024-05-28 RX ADMIN — HEPARIN SODIUM 5000 UNITS: 5000 INJECTION INTRAVENOUS; SUBCUTANEOUS at 15:21

## 2024-05-28 RX ADMIN — OLANZAPINE 5 MG: 10 INJECTION, POWDER, FOR SOLUTION INTRAMUSCULAR at 20:46

## 2024-05-28 RX ADMIN — HYDRALAZINE HYDROCHLORIDE 10 MG: 10 TABLET, FILM COATED ORAL at 20:47

## 2024-05-28 RX ADMIN — HEPARIN SODIUM 5000 UNITS: 5000 INJECTION INTRAVENOUS; SUBCUTANEOUS at 20:46

## 2024-05-28 RX ADMIN — SODIUM BICARBONATE 650 MG: 650 TABLET ORAL at 09:48

## 2024-05-28 RX ADMIN — FUROSEMIDE 40 MG: 10 INJECTION, SOLUTION INTRAMUSCULAR; INTRAVENOUS at 09:53

## 2024-05-28 RX ADMIN — QUETIAPINE FUMARATE 12.5 MG: 25 TABLET ORAL at 17:11

## 2024-05-28 RX ADMIN — Medication 10 ML: at 09:49

## 2024-05-28 RX ADMIN — MIRTAZAPINE 7.5 MG: 15 TABLET, FILM COATED ORAL at 20:47

## 2024-05-28 RX ADMIN — METOPROLOL TARTRATE 25 MG: 25 TABLET, FILM COATED ORAL at 09:53

## 2024-05-28 RX ADMIN — AZITHROMYCIN MONOHYDRATE 500 MG: 500 INJECTION, POWDER, LYOPHILIZED, FOR SOLUTION INTRAVENOUS at 15:27

## 2024-05-28 RX ADMIN — HYDRALAZINE HYDROCHLORIDE 10 MG: 10 TABLET, FILM COATED ORAL at 15:21

## 2024-05-28 NOTE — FLOWSHEET NOTE
05/28/24 1122   Vital Signs   Temp 97.6 °F (36.4 °C)   Temp Source Axillary   Pulse 62   Heart Rate Source Monitor   Respirations 18   /85   MAP (Calculated) 97   MAP (mmHg) 95   BP Location Left upper arm   BP Method Automatic   Patient Position Up in chair   Oxygen Therapy   SpO2 99 %   O2 Device None (Room air)     Patient assessment completed.  No s/s of distress. No additional needs at this time.

## 2024-05-28 NOTE — PROGRESS NOTES
Bedside report and transfer of care given to ANCA Mendez. Pt currently resting in bed with the call light within reach. Pt denies any other care needs at this time. Pt stable at this time.

## 2024-05-28 NOTE — PLAN OF CARE
Problem: Discharge Planning  Goal: Discharge to home or other facility with appropriate resources  Outcome: Progressing     Problem: Pain  Goal: Verbalizes/displays adequate comfort level or baseline comfort level  Outcome: Progressing     Problem: ABCDS Injury Assessment  Goal: Absence of physical injury  Outcome: Progressing     Problem: Skin/Tissue Integrity  Goal: Absence of new skin breakdown  Description: 1.  Monitor for areas of redness and/or skin breakdown  2.  Assess vascular access sites hourly  3.  Every 4-6 hours minimum:  Change oxygen saturation probe site  4.  Every 4-6 hours:  If on nasal continuous positive airway pressure, respiratory therapy assess nares and determine need for appliance change or resting period.  Outcome: Progressing     Problem: Cardiovascular - Adult  Goal: Maintains optimal cardiac output and hemodynamic stability  Outcome: Progressing  Goal: Absence of cardiac dysrhythmias or at baseline  Outcome: Progressing     Problem: Skin/Tissue Integrity - Adult  Goal: Skin integrity remains intact  Outcome: Progressing     Problem: Musculoskeletal - Adult  Goal: Return ADL status to a safe level of function  Outcome: Progressing     Problem: Infection - Adult  Goal: Absence of infection at discharge  Outcome: Progressing     Problem: Nutrition Deficit:  Goal: Optimize nutritional status  Outcome: Progressing

## 2024-05-28 NOTE — FLOWSHEET NOTE
05/28/24 1521   Vital Signs   Temp 97.5 °F (36.4 °C)   Temp Source Axillary   Pulse 100   Heart Rate Source Monitor   Respirations 16   /89   MAP (Calculated) 103   BP Location Left upper arm   BP Method Automatic   Patient Position Semi addiewlers   Pain Assessment   Pain Assessment None - Denies Pain   Oxygen Therapy   SpO2 94 %   O2 Device None (Room air)     Patient assessment completed.  Meds given per MAR. No s/s of distress. No additional needs at this time.

## 2024-05-28 NOTE — PROGRESS NOTES
Hospitalist Progress Note      PCP: Yanira Melo MD    Date of Admission: 5/26/2024    Chief Complaint: shortness of breath    Hospital Course: Lalo Moulton is a 88 y.o.  male with past medical history of hypertension, chronic HFpEF, hyperlipidemia, CKD stage III, GERD, hypothyroidism, visually impaired/legally blind, came to the emergency room complaining of shortness of breath/dyspnea on exertion since past 7 days.  Reports mild productive cough.  Denies any fever, chills, chest pain, abdominal pain, nausea, vomiting, diarrhea or constipation.  Patient was last admitted at [facility] in January 2024 with confusion.  Briefly noted to have A-fib with RVR which spontaneously resolved.  Not on anticoagulation. Pt reported intermittent falls.     Subjective: Pt up to toilet, having loose bm still. Stool has not been sent, reminded staff we need a stool specimen sent. Nursing informed me pt is choking on his pills. Pt states he did not have a good night last night, he is anxious. Reviewed POC. No family at bs.     Assessment/Plan:    Afib RVR  Elevated troponin  -new onset  -tele, HR 80s   -limited echo performed, showed EF of 45-50%, unable to assess wall motion due tot rapid heart rate.   -cardiology consulted, pt back in afib today. continue lopressor 25mg po bid. Due to falls, risk of AC outweigh benefits at this time. Troponin elevation - not c/w ACS due to AF, HF     Acute on Chronic systolic and diastolic exacerbation  -bnp 20,000s on admission, today 15,000s  -lasix  40mg IV bid- managed by nephro  -on RA  -accurate I&O, cardiac low salt diet  -daily weights: admit weight 61.7kg, today's weight 61.7kg     EFERN in CKD IIIA  -admission crt 2.5, baseline 1.6, today 2.4 (trending back up)  -nephrology consulted, agreed with IV lasix  -avoid nephrotoxin     CAP  -continue rocephin and azithromycin  -procal 0.24, recheck  0.28  -RVP negative  -Pneumonia panel needs collected      Diarrhea  -stool for c diff  and GI PCR needs collected    Dysphagia  -per nursing, pt choked on am meds.   -made NPO until speech eval can be done  -obtain CXR    S/p Fall  -PT/OT eval    PMHx HTN, anemia, HLD, GERD, hypothyroidism, Visually impaired/legally blind     Active Hospital Problems    Diagnosis     EFREN (acute kidney injury) ([facility]) [N17.9]     Stage 3a chronic kidney disease ([facility]) [N18.31]     Metabolic acidosis [E87.20]     Severe malnutrition ([facility]) [E43]     Atrial fibrillation with rapid ventricular response ([facility]) [I48.91]        Medications:  Reviewed    Infusion Medications    sodium chloride       Scheduled Medications    metoprolol tartrate  25 mg Oral BID    furosemide  40 mg IntraVENous BID    sodium bicarbonate  650 mg Oral Daily    sterile water        atorvastatin  20 mg Oral Daily    docusate sodium  100 mg Oral BID    levothyroxine  50 mcg Oral Daily    mirtazapine  7.5 mg Oral Nightly    sodium chloride flush  5-40 mL IntraVENous 2 times per day    heparin (porcine)  5,000 Units SubCUTAneous 3 times per day    cefTRIAXone (ROCEPHIN) IV  1,000 mg IntraVENous Q24H    azithromycin  500 mg IntraVENous Q24H     PRN Meds: melatonin, sterile water, sodium chloride flush, sodium chloride, ondansetron **OR** ondansetron, polyethylene glycol, acetaminophen **OR** acetaminophen, perflutren lipid microspheres      Intake/Output Summary (Last 24 hours) at 5/28/2024 0804  Last data filed at 5/28/2024 0344  Gross per 24 hour   Intake --   Output 1000 ml   Net -1000 ml       Physical Exam Performed:    BP (!) 147/92   Pulse 87   Temp 98 °F (36.7 °C) (Axillary)   Resp 17   Ht 1.626 m (5' 4\")   Wt 61.2 kg (135 lb)   SpO2 98%   BMI 23.17 kg/m²     General appearance: No apparent distress, appears stated age and cooperative.  HEENT: Pupils equal, round, and reactive to light. Conjunctivae/corneas clear.  Neck: Supple, with full range of motion. No jugular venous distention. Trachea

## 2024-05-28 NOTE — PROGRESS NOTES
05/28/24 1444   Encounter Summary   Encounter Overview/Reason Attempted Encounter   Service Provided For Patient not available   Referral/Consult From Rounding   Last Encounter  05/28/24  (Patient was with staff/CK)

## 2024-05-28 NOTE — PROGRESS NOTES
[facility] - Rehabilitation Department      Physical Therapy  [id number]  [facility] 3 PROGRESSIVE CARE    [] Initial Evaluation            [x] Daily Treatment Note         [] Discharge Summary      Patient: Lalo Moulton   : 1935   MRN: [id number]   Date of Service:  2024   Admitting Diagnosis: Atrial fibrillation with rapid ventricular response (HCC)  Ordering Physician: Dr. Sow  Current Admission Summary: Lalo Moulton is a 88 y.o. male with past medical history of hypertension, chronic HFpEF, hyperlipidemia, CKD stage III, GERD, hypothyroidism, visually impaired/legally blind, came to the emergency room complaining of shortness of breath/dyspnea on exertion since past 7 days.  Pt diagnosed with afib with RVR, CHF.  24 CXR     Impression:        1. Bibasilar airspace disease, likely similar to the prior study.     Past Medical History:  has a past medical history of Alcohol dependence, in remission, BPH (benign prostatic hyperplasia), Calculus of gallbladder without mention of cholecystitis or obstruction, Calculus of kidney, Cataract, Colon polyp, Diverticulosis, GERD (gastroesophageal reflux disease), Head injury, Hemorrhoid, Hyperlipidemia, Hypertension, Hypertensive kidney disease, malignant, Hypothyroid, Macular degeneration, Migraine, and Open-angle glaucoma.  Past Surgical History:  has a past surgical history that includes Cholecystectomy (2004); hip surgery (1991); Ureter stent placement (10/26/2006); Eye removal (1991); Mandible fracture surgery (1991); Cataract removal with implant (Right, 1/28/15); and Tonsillectomy.  ______________________________________________________________________________________________________________________________________________  Discharge Recommendations: SNF  AMPAC Raw Score:   AM-PAC Inpatient Mobility Raw Score : 17            Therapy discharge recommendations take into account each patient's current medical complexities and are

## 2024-05-28 NOTE — PROGRESS NOTES
chloride infusion, PRN  ondansetron (ZOFRAN-ODT) disintegrating tablet 4 mg, Q8H PRN   Or  ondansetron (ZOFRAN) injection 4 mg, Q6H PRN  polyethylene glycol (GLYCOLAX) packet 17 g, Daily PRN  acetaminophen (TYLENOL) tablet 650 mg, Q6H PRN   Or  acetaminophen (TYLENOL) suppository 650 mg, Q6H PRN  heparin (porcine) injection 5,000 Units, 3 times per day  perflutren lipid microspheres (DEFINITY) injection 1.5 mL, ONCE PRN  cefTRIAXone (ROCEPHIN) 1,000 mg in sterile water 10 mL IV syringe, Q24H  azithromycin (ZITHROMAX) 500 mg in sodium chloride 0.9 % 250 mL IVPB ([id number]), Q24H      Physical exam:     Vitals:  BP (!) 151/105   Pulse 87   Temp 98 °F (36.7 °C) (Axillary)   Resp 17   Ht 1.626 m (5' 4\")   Wt 74.8 kg (165 lb)   SpO2 98%   BMI 28.32 kg/m²   Constitutional:  OAA X3 NAD Yes  Skin: no rash, turgor wnl  Heent:  eomi, mmm  Neck: no bruits or jvd noted  Cardiovascular:  S1, S2 without m/r/g  Respiratory: course rales diffuse   Abdomen:  , soft, nt, nd  Ext:  lower extremity edema Yes  Psychiatric: mood and affect kirstin   Musculoskeletal:  Rom, muscular strength intact    Data:   Labs:  CBC:   Recent Labs     05/26/24  1038 05/27/24  0437 05/28/24  0552   WBC 6.6 4.6 5.1   HGB 13.1* 12.8* 13.4*    191 216       BMP:    Recent Labs     05/26/24  1038 05/27/24  0437 05/28/24  0552    139 140   K 3.9 3.7 3.8    108 104   CO2 19* 17* 20*   BUN 26* 24* 26*   CREATININE 2.5* 2.1* 2.4*   GLUCOSE 93 76 91       Ca/Mg/Phos:   Recent Labs     05/26/24  1038 05/27/24  0437 05/28/24  0552   CALCIUM 8.4 8.1* 8.4   MG 1.8  --   --        Hepatic:   Recent Labs     05/26/24  1038 05/27/24  0437   AST 17 14*   ALT 6* <5*   BILITOT 0.7 0.6   ALKPHOS 109 106       Troponin: No results for input(s): \"TROPONINI\" in the last 72 hours.  BNP: No results for input(s): \"BNP\" in the last 72 hours.  Lipids: No results for input(s): \"CHOL\", \"TRIG\", \"HDL\" in the last 72 hours.    Invalid input(s): \"LDLCALC\",  \"LABVLDL\"  ABGs: No results for input(s): \"PHART\", \"PO2ART\", \"ILX5CFL\" in the last 72 hours.  INR: No results for input(s): \"INR\" in the last 72 hours.  UA:  Recent Labs     05/27/24  0343   COLORU Yellow   GLUCOSEU NEGATIVE   BILIRUBINUR NEGATIVE   KETUA NEGATIVE   PHUR 5.5   PROTEINU 100*   UROBILINOGEN 0.2   NITRU NEGATIVE   LEUKOCYTESUR NEGATIVE        Urine Microscopic:   Recent Labs     05/27/24  0343   WBCUA 0 TO 5   RBCUA 0 TO 2       Urine Culture: No results for input(s): \"LABURIN\" in the last 72 hours.  Urine Chemistry: No results for input(s): \"CLUR\", \"LABCREA\", \"PROTEINUR\", \"NAUR\" in the last 72 hours.      IMAGING:  XR CHEST (2 VW)   Final Result      1. Mild bilateral basilar airspace disease and small pleural effusions.      Electronically signed by Sumeet Montiel MD          Medical Decision Making:  The following items were considered in medical decision making:  Discussion of patient care with other providers  Reviewed clinical lab tests  Reviewed radiology tests- agreee w/ CXR findings   Reviewed other diagnostic tests/interventions      [facility]  Office : [phone] or through [employer]  Fax :[phone]

## 2024-05-28 NOTE — PROGRESS NOTES
fall  Hypertension  Hyperlipidemia  GERD  Hypothyroidism  Visually impaired/legally blind     Plan     Telemetry monitoring  Continue Cardizem gtt.  Obtain limited echo  Consult cardiology  [id number] of 4 (age, CHF, hypertension).  Defer decision of long-term anticoagulation to cardiology.  Patient is high risk for falls.  As per son, patient had a mechanical fall earlier this morning.  Start Lasix 20 mg IV twice daily.  Strict I's and O's, daily weights  Creatinine 2.5, baseline creatinine 1.5-1.6.  Nephrology consulted.  Continue Lipitor  Continue levothyroxine/Remeron  X-ray chest finding noted as above.  Will start on IV ceftriaxone/azithromycin.  Check procalcitonin.  Check respiratory panel.  Check pneumonia panel.  PT/OT eval\"    Past Medical History:  has a past medical history of Alcohol dependence, in remission, BPH (benign prostatic hyperplasia), Calculus of gallbladder without mention of cholecystitis or obstruction, Calculus of kidney, Cataract, Colon polyp, Diverticulosis, GERD (gastroesophageal reflux disease), Head injury, Hemorrhoid, Hyperlipidemia, Hypertension, Hypertensive kidney disease, malignant, Hypothyroid, Macular degeneration, Migraine, and Open-angle glaucoma.  Past Surgical History:  has a past surgical history that includes Cholecystectomy (4/2004); hip surgery (1/1991); Ureter stent placement (10/26/2006); Eye removal (1/1991); Mandible fracture surgery (1/1991); Cataract removal with implant (Right, 1/28/15); and Tonsillectomy.      Discharge Recommendations: [facility]     AM-PAC Inpatient Daily Activity Raw Score: 19    DME Required For Discharge: DME to be determined at next level of care    Therapy discharge recommendations take into account each patient's current medical complexities and are subject to input/oversight from the patient's healthcare team.     Barriers to Home Discharge:   [] Steps to access home entry or bed/bath:   [x] Unable to transfer, ambulate, or propel    Minutes 15           Timed Code Treatment Minutes: 15 Minutes  Total Treatment Minutes:  15       If co-treatement indicated, patient seen for co-treatment due to: requirement of two skilled therapists, patient safety, and/or cognitive status.      Electronically Signed By: Jimena Hightower OT

## 2024-05-28 NOTE — CARE COORDINATION
Discharge Planning Note:    Cm spoke with patient at bedside and spoke to him about going to SNF at dc.  Pt is agreeable.  Spoke to pt daughter and they would like to discuss facility options as a family, not ready to make that decision today.   They are in agreement that their father needs placement at discharge.       Facility preference list provided.  CM will follow up for referrals.

## 2024-05-28 NOTE — CARE COORDINATION
Case Management Assessment  Initial Evaluation    Date/Time of Evaluation: 5/28/2024 11:34 AM  Assessment Completed by: Kateryna Villa    If patient is discharged prior to next notation, then this note serves as note for discharge by case management.    Patient Name: Lalo Moulton                   YOB: 1935  Diagnosis: Elevated troponin [R79.89]  Atrial fibrillation with rapid ventricular response (HCC) [I48.91]  Acute kidney injury superimposed on chronic kidney disease (HCC) [N17.9, N18.9]  Atrial fibrillation, unspecified type (HCC) [I48.91]  Acute congestive heart failure, unspecified heart failure type (HCC) [I50.9]                   Date / Time: 5/26/2024 10:10 AM    Patient Admission Status: Inpatient   Readmission Risk (Low < 19, Mod (19-27), High > 27): Readmission Risk Score: 14.5    Current PCP: Yanira Melo MD  PCP verified by CM? Yes    Chart Reviewed: Yes      History Provided by: Patient  Patient Orientation: Alert and Oriented    Patient Cognition: Alert    Hospitalization in the last 30 days (Readmission):  No  If yes, Readmission Assessment in CM Navigator will be completed.    Advance Directives:      Code Status: DNR-CCA   Patient's Primary Decision Maker is: Named in Scanned ACP Document    Primary Decision Maker: Jeanne Graham - Evaristo - [phone]    Discharge Planning:    Patient lives with: Alone Type of Home: House  Primary Care Giver: Self  Patient Support Systems include: Children   Current Financial resources: Medicare  Current community resources: None  Current services prior to admission: None            Current DME:              Type of Home Care services:  None    ADLS  Prior functional level: Independent in ADLs/IADLs  Current functional level: Assistance with the following:, Cooking, Housework, Shopping, Mobility    PT AM-PAC: 17 /24  OT AM-PAC: 19 /24    Family can provide assistance at DC: Yes  Would you like Case Management to discuss the discharge

## 2024-05-28 NOTE — PROGRESS NOTES
Speech Language Pathology  Clinical Bedside Swallow Assessment and Treatment  Facility/Department: [address] CARE    NAME:Lalo Moulton  : 1935 (88 y.o.)   MRN: [id number]  ROOM: [facility]-  ADMISSION DATE: 2024  PATIENT DIAGNOSIS(ES): Elevated troponin [R79.89]  Atrial fibrillation with rapid ventricular response (HCC) [I48.91]  Acute kidney injury superimposed on chronic kidney disease (HCC) [N17.9, N18.9]  Atrial fibrillation, unspecified type (HCC) [I48.91]  Acute congestive heart failure, unspecified heart failure type (HCC) [I50.9]  Chief Complaint   Patient presents with    Shortness of Breath     SOB and HA for several days. Multiple other complaints.     Patient Active Problem List    Diagnosis Date Noted    Nocturnal leg cramps 10/28/2022    EFREN (acute kidney injury) (HCC) 2024    Stage 3a chronic kidney disease (HCC) 2024    Metabolic acidosis 2024    Severe malnutrition (HCC) 2024    Atrial fibrillation with rapid ventricular response (HCC) 2024    Major depressive disorder, single episode, unspecified 03/10/2020    Weight loss, unintentional 10/26/2018    Numbness and tingling     B12 deficiency 10/27/2017    Pain in both feet 10/24/2017    Macular degeneration 2017    Open-angle glaucoma 2017    Vasomotor rhinitis 2017    Primary insomnia 2016    Vision impairment 2014    Constipation 2014    Anemia 2013    BPH (benign prostatic hyperplasia) 2011    Bilateral renal cysts 2011    Hypothyroidism due to acquired atrophy of thyroid 2011    Migraine 2010    GERD (gastroesophageal reflux disease) 2010    Essential hypertension     Pure hypercholesterolemia     Hypertensive kidney disease with stage 3 chronic kidney disease (HCC)     Calculus of kidney      Past Medical History:   Diagnosis Date    Alcohol dependence, in remission     BPH (benign prostatic hyperplasia) 2011     negatively impacting safety/independence  [] inability to recall sternal precautions  [x] inability to recall and adhere to safe swallow strategies placing pt at risk for aspiration  [] inability to adhere to diet recommendations regarding least restrictive diet  [] limited assistance at home upon discharge  [] severity of neglect (left/right)      Recommendations:   Diet recommendation: IDDSI 7 Regular Solids; IDDSI 0 Thin Liquids; Meds whole with thin liquids  Instrumentation: Not clinically indicated at this time. Will continue to monitor  Risk management: upright for all intake, small bites/sips, assist feed, alternate bites/sips, slow rate of intake, general GERD precautions, general aspiration precautions, and hold PO and contact SLP if s/s of aspiration or worsening respiratory status develop.    Prognosis: Fair - Good  Discharge Recommendation: TBD     Recommended Intervention:    [x] Dysphagia tx  [] Videostroboscopy                      [] NPO   [] MBS       [] Speech/Cog Eval    [x] Therapeutic PO Trials     [] Ice Chips   [] Other:  [] FEES                                                 Dysphagia Therapeutic Intervention:   []  Bolus control Exercises  []  Oral Motor Exercises  []  Richardson Water Protocol  []  Thermal Stimulation  [x]  Oral Care    []  Vital Stim/NMES  []  Laryngeal Exercises  [x]  Patient/Family Education  []  Pharyngeal Exercises  [x]  Therapeutic PO trials with SLP  [x]  Diet tolerance monitoring  []  Other:     Referrals:   [] ENT    []   [] Pulmonology [] GI  [] Neurology  [] RD  [] OT/ PT  [x] N/A    Dysphagia Goals:  Goal 1: The patient will tolerate recommended diet with no clinical s/s of aspiration 5/5 5/28: Facilitated therapeutic feeding trials of regular textured solids and thin liquids. Noted minimal oral residue. Provided liquid wash clearing residue. No s/s of aspiration/ respiratory decline during trials. Recommend regular/thin diet. Cont goal   Goal 2: The

## 2024-05-28 NOTE — PLAN OF CARE
Problem: SLP Adult - Impaired Swallowing  Goal: By Discharge: Advance to least restrictive diet without signs or symptoms of aspiration for planned discharge setting.  See evaluation for individualized goals.  Outcome: Progressing   Intervention: Speech Evaluation/treatment  SLP completed evaluation. Please refer to notes in EMR.    Francisca Locke M.A., SLP-[facility]  Speech-Language Pathologist  [phone]

## 2024-05-28 NOTE — RT PROTOCOL NOTE
RT Inhaler-Nebulizer Bronchodilator Protocol Note    There is a bronchodilator order in the chart from a provider indicating to follow the RT Bronchodilator Protocol and there is an “Initiate RT Inhaler-Nebulizer Bronchodilator Protocol” order as well (see protocol at bottom of note).    CXR Findings:  XR CHEST PORTABLE    Result Date: 5/28/2024  1. Bibasilar airspace disease, likely similar to the prior study.      The findings from the last RT Protocol Assessment were as follows:   History Pulmonary Disease: Smoker 15 pack years or more  Respiratory Pattern: Dyspnea on exertion or RR 21-25 bpm  Breath Sounds: Slightly diminished and/or crackles  Cough: Strong, spontaneous, non-productive  Indication for Bronchodilator Therapy: None  Bronchodilator Assessment Score: 5  Changing Duoneb to BID.    Aerosolized bronchodilator medication orders have been revised according to the RT Inhaler-Nebulizer Bronchodilator Protocol below.    Respiratory Therapist to perform RT Therapy Protocol Assessment initially then follow the protocol.  Repeat RT Therapy Protocol Assessment PRN for score 0-3 or on second treatment, BID, and PRN for scores above 3.    No Indications - adjust the frequency to every 6 hours PRN wheezing or bronchospasm, if no treatments needed after 48 hours then discontinue using Per Protocol order mode.     If indication present, adjust the RT bronchodilator orders based on the Bronchodilator Assessment Score as indicated below.  Use Inhaler orders unless patient has one or more of the following: on home nebulizer, not able to hold breath for 10 seconds, is not alert and oriented, cannot activate and use MDI correctly, or respiratory rate 25 breaths per minute or more, then use the equivalent nebulizer order(s) with same Frequency and PRN reasons based on the score.  If a patient is on this medication at home then do not decrease Frequency below that used at home.    0-3 - enter or revise RT bronchodilator

## 2024-05-28 NOTE — PROGRESS NOTES
Nephrology Consult Note                                                                                                                                                                                                                                                                                                                                                               Office : [phone]     Fax :[phone]    Patient's Name: Lalo Moulton  6:53 PM  5/28/2024    Reason for Consult:  EFREN   Requesting Physician:  Yanria Melo MD  Chief Complaint:    Chief Complaint   Patient presents with    Shortness of Breath     SOB and HA for several days. Multiple other complaints.       Assessment/Plan     # EFREN 2/2 CRS  Admitted with CHF failure 2/2 AF   Volume overload   Cr fluctuating   UOP not optimal   Plan:  Agree with lasix --> increase dose to achieve diuresis   2. Strict I/O, monitor BMP daily   3. Avoid nephrotoxins     # HTN  Decently controlled   On BB , lasix   No meds changes    # Anemia    # Acid- base/ Electrolyte imbalance   Metabolic acidosis 2/2 EFREN   Plan:  sodium BC from tomorrow (once UOP and volume better controlled    # CKD stage IIIA  Base line Cr 1.5   Monitor BMP     # AF-RVR  Converted to sinus   Now on BB  Monitor HR  Cardiology consulted     History of Present Ilness:    Lalo Moulton is a 88 y.o. male with   PMH of hypertension, chronic HFpEF, hyperlipidemia, CKD stage III, GERD, hypothyroidism, visually impaired/legally blind,admitted for SOB. Pt was found to have:   Leg edema , Elevated BP , AF-RVR    BNP 20 k   Cr 2.5<--1.5  Today Cr down to 2.1  BC low 17   Hb 12.8   BP controlled on r.a      Interval hx     Cr higher again 2.4   BC 20   Pr-becka mildly elevated   BNp 15k <--20 k   Hb 13.4   BP controlled  UOP 1.3 L   Pt denies CP/SOB/palpitations/abdominal pain/N/V.     Past Medical History:   Diagnosis Date    Alcohol dependence, in remission     BPH (benign prostatic hyperplasia)

## 2024-05-28 NOTE — FLOWSHEET NOTE
05/28/24 0714   Vital Signs   Temp 98 °F (36.7 °C)   Temp Source Axillary   Pulse 87   Heart Rate Source Monitor   Respirations 17   BP (!) 147/92   MAP (Calculated) 110   MAP (mmHg) 107   BP Location Right upper arm   BP Method Automatic   Patient Position Semi fowlers   Oxygen Therapy   SpO2 98 %   O2 Device None (Room air)     Patient assessment completed.  Meds given per MAR. No s/s of distress. No additional needs at this time.   Patient is having trouble swallowing pills and is coughing now due to choking on pills.

## 2024-05-28 NOTE — PROGRESS NOTES
[facility]  Progress note  [phone]      Chief Complaint   Patient presents with    Shortness of Breath     SOB and HA for several days. Multiple other complaints.            History of Present Illness:  Lalo Moulton is a 88 y.o. patient who presented to the hospital with complaints of cough. I have been asked to provide consultation regarding further management and testing.    Subjective: no acute events overnight. Patient is coughing this morning. History provided by the patient's daughter. She reports that he has only had one fall, but dose bruise easily. She reports that he has been coughing frequently. She states that he coughs after he eats. The patient states that he can walk a block or two. He does not use a walker or cane like he is supposed to. He reports that he has had a sore throat. No fevers or chills. No chest pain or pressure. They report that his son is an internal medicine physician/DPOA and does not think he should be on anticoagulation.       Past Medical History:   has a past medical history of Alcohol dependence, in remission, BPH (benign prostatic hyperplasia), Calculus of gallbladder without mention of cholecystitis or obstruction, Calculus of kidney, Cataract, Colon polyp, Diverticulosis, GERD (gastroesophageal reflux disease), Head injury, Hemorrhoid, Hyperlipidemia, Hypertension, Hypertensive kidney disease, malignant, Hypothyroid, Macular degeneration, Migraine, and Open-angle glaucoma.    Surgical History:   has a past surgical history that includes Cholecystectomy (4/2004); hip surgery (1/1991); Ureter stent placement (10/26/2006); Eye removal (1/1991); Mandible fracture surgery (1/1991); Cataract removal with implant (Right, 1/28/15); and Tonsillectomy.     Social History:   reports that he quit smoking about 43 years ago. His smoking use included cigarettes. He has never used smokeless tobacco. He reports that he does not drink alcohol and does not use drugs.     Family

## 2024-05-29 ENCOUNTER — APPOINTMENT (OUTPATIENT)
Age: 89
DRG: 291 | End: 2024-05-29
Payer: MEDICARE

## 2024-05-29 LAB
ANION GAP SERPL CALCULATED.3IONS-SCNC: 15 MMOL/L (ref 3–16)
BASOPHILS # BLD: 0.01 K/UL (ref 0–0.2)
BASOPHILS NFR BLD: 0 %
BUN SERPL-MCNC: 26 MG/DL (ref 7–20)
C DIFF GDH + TOXINS A+B STL QL IA.RAPID: NEGATIVE
CA-I BLD-SCNC: 0.96 MMOL/L (ref 1.12–1.32)
CALCIUM SERPL-MCNC: 7.8 MG/DL (ref 8.3–10.6)
CHLORIDE SERPL-SCNC: 102 MMOL/L (ref 99–110)
CO2 SERPL-SCNC: 20 MMOL/L (ref 21–32)
CREAT SERPL-MCNC: 2.3 MG/DL (ref 0.8–1.3)
EOSINOPHIL # BLD: 0.06 K/UL (ref 0–0.6)
EOSINOPHILS RELATIVE PERCENT: 1 %
ERYTHROCYTE [DISTWIDTH] IN BLOOD BY AUTOMATED COUNT: 15.3 % (ref 12.4–15.4)
GFR, ESTIMATED: 26 ML/MIN/1.73M2
GLUCOSE SERPL-MCNC: 99 MG/DL (ref 70–99)
HCT VFR BLD AUTO: 38.8 % (ref 40.5–52.5)
HGB BLD-MCNC: 13 G/DL (ref 13.5–17.5)
IMM GRANULOCYTES # BLD AUTO: 0.02 K/UL (ref 0–0.5)
IMM GRANULOCYTES NFR BLD: 0 %
LYMPHOCYTES NFR BLD: 0.34 K/UL (ref 1–5.1)
LYMPHOCYTES RELATIVE PERCENT: 5 %
MAGNESIUM SERPL-MCNC: 1.5 MG/DL (ref 1.8–2.4)
MCH RBC QN AUTO: 31 PG (ref 26–34)
MCHC RBC AUTO-ENTMCNC: 33.5 G/DL (ref 31–36)
MCV RBC AUTO: 92.4 FL (ref 80–100)
MONOCYTES NFR BLD: 0.59 K/UL (ref 0–1.3)
MONOCYTES NFR BLD: 9 %
NEUTROPHILS NFR BLD: 85 %
NEUTS SEG NFR BLD: 5.61 K/UL (ref 1.7–7.7)
PLATELET # BLD AUTO: 189 K/UL (ref 135–450)
PMV BLD AUTO: 9.3 FL
POTASSIUM SERPL-SCNC: 3.3 MMOL/L (ref 3.5–5.1)
RBC # BLD AUTO: 4.2 M/UL (ref 4.2–5.9)
S PYO AG THROAT QL: NEGATIVE
SODIUM SERPL-SCNC: 137 MMOL/L (ref 136–145)
SPECIMEN DESCRIPTION: NORMAL
SPECIMEN SOURCE: NORMAL
WBC OTHER # BLD: 6.6 K/UL (ref 4–11)

## 2024-05-29 PROCEDURE — 2580000003 HC RX 258: Performed by: INTERNAL MEDICINE

## 2024-05-29 PROCEDURE — 80048 BASIC METABOLIC PNL TOTAL CA: CPT

## 2024-05-29 PROCEDURE — 87081 CULTURE SCREEN ONLY: CPT

## 2024-05-29 PROCEDURE — 6370000000 HC RX 637 (ALT 250 FOR IP): Performed by: INTERNAL MEDICINE

## 2024-05-29 PROCEDURE — 94640 AIRWAY INHALATION TREATMENT: CPT

## 2024-05-29 PROCEDURE — 83735 ASSAY OF MAGNESIUM: CPT

## 2024-05-29 PROCEDURE — 92526 ORAL FUNCTION THERAPY: CPT

## 2024-05-29 PROCEDURE — 2060000000 HC ICU INTERMEDIATE R&B

## 2024-05-29 PROCEDURE — 6360000002 HC RX W HCPCS: Performed by: INTERNAL MEDICINE

## 2024-05-29 PROCEDURE — 87880 STREP A ASSAY W/OPTIC: CPT

## 2024-05-29 PROCEDURE — 71250 CT THORAX DX C-: CPT

## 2024-05-29 PROCEDURE — 82330 ASSAY OF CALCIUM: CPT

## 2024-05-29 PROCEDURE — 2500000003 HC RX 250 WO HCPCS: Performed by: NURSE PRACTITIONER

## 2024-05-29 PROCEDURE — 85025 COMPLETE CBC W/AUTO DIFF WBC: CPT

## 2024-05-29 PROCEDURE — 36415 COLL VENOUS BLD VENIPUNCTURE: CPT

## 2024-05-29 PROCEDURE — 97530 THERAPEUTIC ACTIVITIES: CPT

## 2024-05-29 PROCEDURE — 99232 SBSQ HOSP IP/OBS MODERATE 35: CPT | Performed by: INTERNAL MEDICINE

## 2024-05-29 RX ORDER — IPRATROPIUM BROMIDE AND ALBUTEROL SULFATE 2.5; .5 MG/3ML; MG/3ML
1 SOLUTION RESPIRATORY (INHALATION) 2 TIMES DAILY PRN
Status: DISCONTINUED | OUTPATIENT
Start: 2024-05-29 | End: 2024-05-31 | Stop reason: HOSPADM

## 2024-05-29 RX ORDER — CALCIUM GLUCONATE 10 MG/ML
1000 INJECTION, SOLUTION INTRAVENOUS ONCE
Status: COMPLETED | OUTPATIENT
Start: 2024-05-29 | End: 2024-05-29

## 2024-05-29 RX ORDER — POTASSIUM CHLORIDE 20 MEQ/1
40 TABLET, EXTENDED RELEASE ORAL ONCE
Status: DISCONTINUED | OUTPATIENT
Start: 2024-05-29 | End: 2024-05-29 | Stop reason: SDUPTHER

## 2024-05-29 RX ORDER — METOPROLOL TARTRATE 50 MG/1
50 TABLET, FILM COATED ORAL 2 TIMES DAILY
Status: DISCONTINUED | OUTPATIENT
Start: 2024-05-30 | End: 2024-05-31 | Stop reason: HOSPADM

## 2024-05-29 RX ORDER — MAGNESIUM SULFATE IN WATER 40 MG/ML
2000 INJECTION, SOLUTION INTRAVENOUS ONCE
Status: COMPLETED | OUTPATIENT
Start: 2024-05-29 | End: 2024-05-29

## 2024-05-29 RX ADMIN — MIRTAZAPINE 7.5 MG: 15 TABLET, FILM COATED ORAL at 20:30

## 2024-05-29 RX ADMIN — FUROSEMIDE 60 MG: 10 INJECTION, SOLUTION INTRAMUSCULAR; INTRAVENOUS at 18:38

## 2024-05-29 RX ADMIN — IPRATROPIUM BROMIDE AND ALBUTEROL SULFATE 1 DOSE: 2.5; .5 SOLUTION RESPIRATORY (INHALATION) at 07:52

## 2024-05-29 RX ADMIN — CALCIUM GLUCONATE 1000 MG: 10 INJECTION, SOLUTION INTRAVENOUS at 11:01

## 2024-05-29 RX ADMIN — POTASSIUM BICARBONATE 40 MEQ: 782 TABLET, EFFERVESCENT ORAL at 08:45

## 2024-05-29 RX ADMIN — METOPROLOL TARTRATE 25 MG: 25 TABLET, FILM COATED ORAL at 08:44

## 2024-05-29 RX ADMIN — LEVOTHYROXINE SODIUM 50 MCG: 25 TABLET ORAL at 08:44

## 2024-05-29 RX ADMIN — HEPARIN SODIUM 5000 UNITS: 5000 INJECTION INTRAVENOUS; SUBCUTANEOUS at 05:31

## 2024-05-29 RX ADMIN — Medication 10 ML: at 08:44

## 2024-05-29 RX ADMIN — HYDRALAZINE HYDROCHLORIDE 10 MG: 10 TABLET, FILM COATED ORAL at 20:30

## 2024-05-29 RX ADMIN — MAGNESIUM SULFATE HEPTAHYDRATE 2000 MG: 40 INJECTION, SOLUTION INTRAVENOUS at 07:53

## 2024-05-29 RX ADMIN — WATER 1000 MG: 1 INJECTION INTRAMUSCULAR; INTRAVENOUS; SUBCUTANEOUS at 13:08

## 2024-05-29 RX ADMIN — FUROSEMIDE 60 MG: 10 INJECTION, SOLUTION INTRAMUSCULAR; INTRAVENOUS at 08:45

## 2024-05-29 RX ADMIN — Medication 10 ML: at 20:31

## 2024-05-29 RX ADMIN — ATORVASTATIN CALCIUM 20 MG: 20 TABLET, FILM COATED ORAL at 08:44

## 2024-05-29 RX ADMIN — HEPARIN SODIUM 5000 UNITS: 5000 INJECTION INTRAVENOUS; SUBCUTANEOUS at 20:30

## 2024-05-29 RX ADMIN — DOCUSATE SODIUM 100 MG: 100 CAPSULE, LIQUID FILLED ORAL at 20:30

## 2024-05-29 RX ADMIN — DOCUSATE SODIUM 100 MG: 100 CAPSULE, LIQUID FILLED ORAL at 08:44

## 2024-05-29 RX ADMIN — METOPROLOL TARTRATE 25 MG: 25 TABLET, FILM COATED ORAL at 20:30

## 2024-05-29 RX ADMIN — HYDRALAZINE HYDROCHLORIDE 10 MG: 10 TABLET, FILM COATED ORAL at 13:08

## 2024-05-29 RX ADMIN — SODIUM BICARBONATE 650 MG: 650 TABLET ORAL at 08:45

## 2024-05-29 RX ADMIN — HEPARIN SODIUM 5000 UNITS: 5000 INJECTION INTRAVENOUS; SUBCUTANEOUS at 13:08

## 2024-05-29 RX ADMIN — ISOSORBIDE MONONITRATE 15 MG: 30 TABLET, EXTENDED RELEASE ORAL at 08:44

## 2024-05-29 ASSESSMENT — PAIN SCALES - GENERAL
PAINLEVEL_OUTOF10: 0
PAINLEVEL_OUTOF10: 0

## 2024-05-29 NOTE — PROGRESS NOTES
Speech Language Pathology  Dysphagia Treatment  Facility/Department: [facility] PROGRESSIVE CARE    NAME:Lalo Moulton  : 1935 (88 y.o.)   MRN: [id number]  ROOM: [address] [place] [address]  ADMISSION DATE: 2024  PATIENT DIAGNOSIS(ES): Elevated troponin [R79.89]  Atrial fibrillation with rapid ventricular response (HCC) [I48.91]  Acute kidney injury superimposed on chronic kidney disease (HCC) [N17.9, N18.9]  Atrial fibrillation, unspecified type (HCC) [I48.91]  Acute congestive heart failure, unspecified heart failure type (HCC) [I50.9]  Chief Complaint   Patient presents with    Shortness of Breath     SOB and HA for several days. Multiple other complaints.     Patient Active Problem List    Diagnosis Date Noted    Nocturnal leg cramps 10/28/2022    EFREN (acute kidney injury) (HCC) 2024    Stage 3a chronic kidney disease (HCC) 2024    Metabolic acidosis 2024    Severe malnutrition (HCC) 2024    Atrial fibrillation with rapid ventricular response (HCC) 2024    Major depressive disorder, single episode, unspecified 03/10/2020    Weight loss, unintentional 10/26/2018    Numbness and tingling     B12 deficiency 10/27/2017    Pain in both feet 10/24/2017    Macular degeneration 2017    Open-angle glaucoma 2017    Vasomotor rhinitis 2017    Primary insomnia 2016    Vision impairment 2014    Constipation 2014    Anemia 2013    BPH (benign prostatic hyperplasia) 2011    Bilateral renal cysts 2011    Hypothyroidism due to acquired atrophy of thyroid 2011    Migraine 2010    GERD (gastroesophageal reflux disease) 2010    Essential hypertension     Pure hypercholesterolemia     Hypertensive kidney disease with stage 3 chronic kidney disease (HCC)     Calculus of kidney      Past Medical History:   Diagnosis Date    Alcohol dependence, in remission     BPH (benign prostatic hyperplasia) 2011    Patient was evaluated by

## 2024-05-29 NOTE — FLOWSHEET NOTE
05/29/24 1106   Vital Signs   Temp 98 °F (36.7 °C)   Temp Source Axillary   Pulse 62   Heart Rate Source Monitor   Respirations 18   /82   MAP (Calculated) 100   BP Location Right upper arm   BP Method Automatic   Patient Position Semi fowlers   Oxygen Therapy   SpO2 96 %   O2 Device None (Room air)     Patient assessment completed.  Meds given per MAR. No s/s of distress. No additional needs at this time.

## 2024-05-29 NOTE — PROGRESS NOTES
05/29/24 1421   Encounter Summary   Encounter Overview/Reason Attempted Encounter   Service Provided For Patient not available   Referral/Consult From Rounding   Last Encounter  05/29/24  (Patient was sleeping/CK)

## 2024-05-29 NOTE — FLOWSHEET NOTE
05/29/24 0742   Vital Signs   Temp 97.7 °F (36.5 °C)   Temp Source Axillary   Pulse 65   Heart Rate Source Monitor   Respirations 18   BP (!) 155/126  (ANCA Price in room and aware)   MAP (Calculated) 136   BP Location Right upper arm   BP Method Automatic   Patient Position Semi fowlers   Oxygen Therapy   SpO2 95 %   O2 Device None (Room air)     Patient assessment completed.  Meds given per MAR. No s/s of distress. No additional needs at this time.

## 2024-05-29 NOTE — CARE COORDINATION
[facility] and [facility] pt. Pre-cert started. Spoke and conveyed info to pts enriqueta Tirado. I left a message with pt son Jorge.

## 2024-05-29 NOTE — FLOWSHEET NOTE
05/29/24 1458   Vital Signs   Temp 98.2 °F (36.8 °C)   Temp Source Oral   Pulse 68   Heart Rate Source Monitor   Respirations 18   BP (!) 146/84   MAP (Calculated) 105   BP Location Left upper arm   BP Method Automatic   Patient Position Lying right side   Oxygen Therapy   SpO2 98 %   O2 Device None (Room air)     Patient assessment completed. No s/s of distress. No additional needs at this time.

## 2024-05-29 NOTE — PROGRESS NOTES
[facility] - Rehabilitation Department      Physical Therapy  [id number]  [facility] 3 PROGRESSIVE CARE    [] Initial Evaluation            [x] Daily Treatment Note         [] Discharge Summary      Patient: Lalo Moulton   : 1935   MRN: [id number]   Date of Service:  2024   Admitting Diagnosis: Atrial fibrillation with rapid ventricular response (HCC)  Ordering Physician: Dr. Sow  Current Admission Summary: Lalo Moulton is a 88 y.o. male with past medical history of hypertension, chronic HFpEF, hyperlipidemia, CKD stage III, GERD, hypothyroidism, visually impaired/legally blind, came to the emergency room complaining of shortness of breath/dyspnea on exertion since past 7 days.  Pt diagnosed with afib with RVR, CHF.  24 CXR     Impression:        1. Bibasilar airspace disease, likely similar to the prior study.     Past Medical History:  has a past medical history of Alcohol dependence, in remission, BPH (benign prostatic hyperplasia), Calculus of gallbladder without mention of cholecystitis or obstruction, Calculus of kidney, Cataract, Colon polyp, Diverticulosis, GERD (gastroesophageal reflux disease), Head injury, Hemorrhoid, Hyperlipidemia, Hypertension, Hypertensive kidney disease, malignant, Hypothyroid, Macular degeneration, Migraine, and Open-angle glaucoma.  Past Surgical History:  has a past surgical history that includes Cholecystectomy (2004); hip surgery (1991); Ureter stent placement (10/26/2006); Eye removal (1991); Mandible fracture surgery (1991); Cataract removal with implant (Right, 1/28/15); and Tonsillectomy.  ______________________________________________________________________________________________________________________________________________  Discharge Recommendations: SNF  AMPAC Raw Score:   AM-PAC Inpatient Mobility Raw Score : 17            Therapy discharge recommendations take into account each patient's current medical complexities and are  subject to input/oversight from the patient's healthcare team.   Barriers to Home Discharge:   [] Steps to access home entry or bed/bath:   [] Unable to transfer, ambulate, or propel wheelchair household distances without assist   [x] Limited available assist at home upon discharge    [] Patient or family requests d/c to post-acute facility    [] Poor cognition/safety awareness for d/c to home alone    []Unable to maintain ordered weight bearing status    [] Patient with salient signs of long-standing immobility   [x] Patient is at risk for falls due to: Blindness and dizziness   [] Other:  If pt is unable to be seen after this session, please let this note serve as discharge summary.  Please see case management note for discharge disposition.  Thank you.    DME Required For Discharge:   kmotd/cequipment: DME to be determined at next level of care    Assessment/Clinical Assessment:   Pt seen today for physical therapy Treatment.   Pt demonstrated decreased Activity tolerance, Functional Mobility , Balance, and Safetyas well as decreased independence with Ambulation and Transfers.   Recommending SNF upon discharge as patient functioning well below baseline, demonstrates good rehab potential and unable to return home due to limited or no family support and home environment not conducive to patient recovery.   Clinical Assessment:  Pt reported he had 3 bowl movements and needed to be cleaned up. Pt required min A for B rolling w/ use of rail and min A for bed mobility w/ hand placement on rail and Min A for transfers and ambulation w/ HHA. Patient presents with less impaired balance with posterior lob than last session requiring min a to maintain static and dynamic standing balance.     Activity Tolerance:See below.   Impairments Requiring Therapeutic Intervention: decreased functional mobility, decreased balance  Rehab Potential:  Yes...

## 2024-05-29 NOTE — PLAN OF CARE
Problem: Discharge Planning  Goal: Discharge to home or other facility with appropriate resources  Outcome: Progressing     Problem: Pain  Goal: Verbalizes/displays adequate comfort level or baseline comfort level  Outcome: Progressing     Problem: Safety - Adult  Goal: Free from fall injury  Outcome: Progressing     Problem: ABCDS Injury Assessment  Goal: Absence of physical injury  Outcome: Progressing     Problem: Cardiovascular - Adult  Goal: Maintains optimal cardiac output and hemodynamic stability  Outcome: Progressing  Goal: Absence of cardiac dysrhythmias or at baseline  Outcome: Progressing     Problem: Skin/Tissue Integrity - Adult  Goal: Skin integrity remains intact  Outcome: Progressing     Problem: Musculoskeletal - Adult  Goal: Return ADL status to a safe level of function  Outcome: Progressing     Problem: Infection - Adult  Goal: Absence of infection at discharge  Outcome: Progressing     Problem: Nutrition Deficit:  Goal: Optimize nutritional status  Outcome: Progressing

## 2024-05-29 NOTE — PLAN OF CARE
Problem: Discharge Planning  Goal: Discharge to home or other facility with appropriate resources  5/28/2024 2322 by Tawanda Person, RN  Outcome: Progressing  Flowsheets (Taken 5/28/2024 2322)  Discharge to home or other facility with appropriate resources:   Identify barriers to discharge with patient and caregiver   Identify discharge learning needs (meds, wound care, etc)   Refer to discharge planning if patient needs post-hospital services based on physician order or complex needs related to functional status, cognitive ability or social support system   Arrange for needed discharge resources and transportation as appropriate     Problem: Pain  Goal: Verbalizes/displays adequate comfort level or baseline comfort level  5/28/2024 2322 by Tawanda Person, RN  Outcome: Progressing  Flowsheets (Taken 5/28/2024 2322)  Verbalizes/displays adequate comfort level or baseline comfort level:   Encourage patient to monitor pain and request assistance   Administer analgesics based on type and severity of pain and evaluate response   Consider cultural and social influences on pain and pain management   Assess pain using appropriate pain scale   Implement non-pharmacological measures as appropriate and evaluate response   Notify Licensed Independent Practitioner if interventions unsuccessful or patient reports new pain     Problem: Safety - Adult  Goal: Free from fall injury  Outcome: Progressing  Flowsheets (Taken 5/28/2024 2322)  Free From Fall Injury:   Instruct family/caregiver on patient safety   Based on caregiver fall risk screen, instruct family/caregiver to ask for assistance with transferring infant if caregiver noted to have fall risk factors     Problem: ABCDS Injury Assessment  Goal: Absence of physical injury  5/28/2024 2322 by Tawanda Person, RN  Outcome: Progressing  Flowsheets (Taken 5/27/2024 1942)  Absence of Physical Injury: Implement safety measures based on patient assessment     Problem:

## 2024-05-29 NOTE — CARE COORDINATION
Spoke with family and there three SNF are as follows: 1. [facility] 2. [facility] 3. [facility].     Spoke with Radha and referred pt to [facility].

## 2024-05-29 NOTE — RT PROTOCOL NOTE
RT Inhaler-Nebulizer Bronchodilator Protocol Note    There is a bronchodilator order in the chart from a provider indicating to follow the RT Bronchodilator Protocol and there is an “Initiate RT Inhaler-Nebulizer Bronchodilator Protocol” order as well (see protocol at bottom of note).    CXR Findings:  XR CHEST PORTABLE    Result Date: 5/28/2024  1. Bibasilar airspace disease, likely similar to the prior study.      The findings from the last RT Protocol Assessment were as follows:   History Pulmonary Disease: Smoker 15 pack years or more  Respiratory Pattern: Regular pattern and RR 12-20 bpm  Breath Sounds: Slightly diminished and/or crackles  Cough: Strong, spontaneous, non-productive  Indication for Bronchodilator Therapy: None  Bronchodilator Assessment Score: 3  Changing to PRN .    Aerosolized bronchodilator medication orders have been revised according to the RT Inhaler-Nebulizer Bronchodilator Protocol below.    Respiratory Therapist to perform RT Therapy Protocol Assessment initially then follow the protocol.  Repeat RT Therapy Protocol Assessment PRN for score 0-3 or on second treatment, BID, and PRN for scores above 3.    No Indications - adjust the frequency to every 6 hours PRN wheezing or bronchospasm, if no treatments needed after 48 hours then discontinue using Per Protocol order mode.     If indication present, adjust the RT bronchodilator orders based on the Bronchodilator Assessment Score as indicated below.  Use Inhaler orders unless patient has one or more of the following: on home nebulizer, not able to hold breath for 10 seconds, is not alert and oriented, cannot activate and use MDI correctly, or respiratory rate 25 breaths per minute or more, then use the equivalent nebulizer order(s) with same Frequency and PRN reasons based on the score.  If a patient is on this medication at home then do not decrease Frequency below that used at home.    0-3 - enter or revise RT bronchodilator order(s) to  equivalent RT Bronchodilator order with Frequency of every 4 hours PRN for wheezing or increased work of breathing using Per Protocol order mode.        4-6 - enter or revise RT Bronchodilator order(s) to two equivalent RT bronchodilator orders with one order with BID Frequency and one order with Frequency of every 4 hours PRN wheezing or increased work of breathing using Per Protocol order mode.        7-10 - enter or revise RT Bronchodilator order(s) to two equivalent RT bronchodilator orders with one order with TID Frequency and one order with Frequency of every 4 hours PRN wheezing or increased work of breathing using Per Protocol order mode.       11-13 - enter or revise RT Bronchodilator order(s) to one equivalent RT bronchodilator order with QID Frequency and an Albuterol order with Frequency of every 4 hours PRN wheezing or increased work of breathing using Per Protocol order mode.      Greater than 13 - enter or revise RT Bronchodilator order(s) to one equivalent RT bronchodilator order with every 4 hours Frequency and an Albuterol order with Frequency of every 2 hours PRN wheezing or increased work of breathing using Per Protocol order mode.     RT to enter RT Home Evaluation for COPD & MDI Assessment order using Per Protocol order mode.    Electronically signed by Shahla Rico RCP on 5/29/2024 at 7:57 AM

## 2024-05-29 NOTE — RT PROTOCOL NOTE
RT Nebulizer Bronchodilator Protocol Note    There is a bronchodilator order in the chart from a provider indicating to follow the RT Bronchodilator Protocol and there is an “Initiate RT Bronchodilator Protocol” order as well (see protocol at bottom of note).    CXR Findings:  XR CHEST PORTABLE    Result Date: 5/28/2024  1. Bibasilar airspace disease, likely similar to the prior study.      The findings from the last RT Protocol Assessment were as follows:  Smoking: Smoker 15 pack years or more  Respiratory Pattern: Dyspnea on exertion or RR 21-25 bpm  Breath Sounds: Slightly diminished and/or crackles  Cough: Strong, spontaneous, non-productive  Indication for Bronchodilator Therapy: None  Bronchodilator Assessment Score: 5    Aerosolized bronchodilator medication orders have been revised according to the RT Nebulizer Bronchodilator Protocol below.    Respiratory Therapist to perform RT Therapy Protocol Assessment initially then follow the protocol.  Repeat RT Therapy Protocol Assessment PRN for score 0-3 or on second treatment, BID, and PRN for scores above 3.    No Indications - adjust the frequency to every 6 hours PRN wheezing or bronchospasm, if no treatments needed after 48 hours then discontinue using Per Protocol order mode.     If indication present, adjust the RT bronchodilator orders based on the Bronchodilator Assessment Score as indicated below.  If a patient is on this medication at home then do not decrease Frequency below that used at home.    0-3 - enter or revise RT bronchodilator order(s) to equivalent RT Bronchodilator order with Frequency of every 4 hours PRN for wheezing or increased work of breathing using Per Protocol order mode.       4-6 - enter or revise RT Bronchodilator order(s) to two equivalent RT bronchodilator orders with one order with BID Frequency and one order with Frequency of every 4 hours PRN wheezing or increased work of breathing using Per Protocol order mode.         7-10

## 2024-05-29 NOTE — PROGRESS NOTES
Hospitalist Progress Note      PCP: Yanira Melo MD    Date of Admission: 5/26/2024    Chief Complaint: Shortness of breath    Hospital Course: Lalo Moulton is a 88 y.o.  male with past medical history of hypertension, chronic HFpEF, hyperlipidemia, CKD stage III, GERD, hypothyroidism, visually impaired/legally blind, came to the emergency room complaining of shortness of breath/dyspnea on exertion since past 7 days.  Reports mild productive cough.  Denies any fever, chills, chest pain, abdominal pain, nausea, vomiting, diarrhea or constipation.  Patient was last admitted at [facility] in January 2024 with confusion.  Briefly noted to have A-fib with RVR which spontaneously resolved.  Not on anticoagulation. Pt reported intermittent falls.     Subjective: Patient sitting up resting in bed.  No acute distress noted.  Patient denies any new concerns this morning.  Patient reports he rested better overnight.  No family at bedside  Nurse reports no acute events overnight.  Patient is having loose stools-C. difficile negative    Assessment/Plan:    Afib RVR  Elevated troponin  -5/29 -rate controlled A-fib heart rate in the 80s  -new onset  -tele, HR 80s   -limited echo performed, showed EF of 45-50%, unable to assess wall motion due tot rapid heart rate.   -cardiology consulted, continue lopressor 25mg po bid. Due to falls, risk of AC outweigh benefits at this time. Troponin elevation - not c/w ACS due to AF, HF      Acute on Chronic systolic and diastolic exacerbation  Echo-EF 45 to 50%-indeterminate diastolic function due to A-fib.  -Per cardiology beta-blocker dose increased, added low-dose hydralazine and Imdur  -bnp 20,000s on admission, (5/28) 15,000s  -lasix   60 mg IV bid- managed by nephro  -on RA  -accurate I&O, cardiac low salt diet  -daily weights: admit weight 61.7kg, today's weight 60.1 kg     EFREN in CKD III A  -5/29-nephrology increased Lasix dose to 60 mg twice daily for  hour   Intake 60 ml   Output --   Net 60 ml       Physical Exam Performed:    /82   Pulse 62   Temp 98 °F (36.7 °C) (Axillary)   Resp 18   Ht 1.626 m (5' 4\")   Wt 60.1 kg (132 lb 6.4 oz)   SpO2 96%   BMI 22.73 kg/m²     General appearance: No apparent distress, appears stated age and cooperative.  HEENT: Pupils equal, round, and reactive to light. Conjunctivae/corneas clear.  Neck: Supple, with full range of motion. No jugular venous distention. Trachea midline.  Respiratory:  Normal respiratory effort. Diffuse Crackles .  Cardiovascular: Irregularly irregular. Murmur noted.   Abdomen: Soft, non-tender, non-distended with normal bowel sounds.  Musculoskeletal: No clubbing, cyanosis or edema bilaterally.  Full range of motion without deformity.  Skin: Skin color, texture, turgor normal.  No rashes or lesions.  Neurologic:  Neurovascularly intact without any focal sensory/motor deficits.    Psychiatric: Alert and oriented, thought content appropriate, normal insight  Capillary Refill: Brisk,< 3 seconds   Peripheral Pulses: +2 palpable, equal bilaterally       Labs:   Recent Labs     05/27/24  0437 05/28/24  0552 05/29/24  0532   WBC 4.6 5.1 6.6   HGB 12.8* 13.4* 13.0*   HCT 38.2* 41.2 38.8*    216 189     Recent Labs     05/27/24  0437 05/28/24  0552 05/29/24  0532    140 137   K 3.7 3.8 3.3*    104 102   CO2 17* 20* 20*   BUN 24* 26* 26*   CREATININE 2.1* 2.4* 2.3*   CALCIUM 8.1* 8.4 7.8*     Recent Labs     05/27/24  0437   AST 14*   ALT <5*   BILITOT 0.6   ALKPHOS 106     No results for input(s): \"INR\" in the last 72 hours.  No results for input(s): \"CKTOTAL\", \"TROPONINI\" in the last 72 hours.    Urinalysis:      Lab Results   Component Value Date/Time    NITRU NEGATIVE 05/27/2024 03:43 AM    WBCUA 0 TO 5 05/27/2024 03:43 AM    RBCUA 0 TO 2 05/27/2024 03:43 AM    BLOODU neg 10/03/2014 12:00 AM    BLOODU NEGATIVE 03/21/2011 10:09 AM    SPECGRAV 1.015 10/03/2014 12:00 AM    GLUCOSEU  no numbness/no nausea/no vomiting

## 2024-05-29 NOTE — PROGRESS NOTES
[facility]  Progress note  [phone]      Chief Complaint   Patient presents with    Shortness of Breath     SOB and HA for several days. Multiple other complaints.            History of Present Illness:  Lalo Moulton is a 88 y.o. patient who presented to the hospital with complaints of cough. I have been asked to provide consultation regarding further management and testing.    Subjective: no acute events overnight. Patient is coughing this morning. History provided by the patient's daughter. She reports that he has only had one fall, but dose bruise easily. She reports that he has been coughing frequently. She states that he coughs after he eats. The patient states that he can walk a block or two. He does not use a walker or cane like he is supposed to. He reports that he has had a sore throat. No fevers or chills. No chest pain or pressure. They report that his son is an internal medicine physician/DPOA and does not think he should be on anticoagulation.     Subjective: patient reports that his cough is worse. No chest pain or pressure. No nausea or vomiting. Rapid afib ovenright      Past Medical History:   has a past medical history of Alcohol dependence, in remission, BPH (benign prostatic hyperplasia), Calculus of gallbladder without mention of cholecystitis or obstruction, Calculus of kidney, Cataract, Colon polyp, Diverticulosis, GERD (gastroesophageal reflux disease), Head injury, Hemorrhoid, Hyperlipidemia, Hypertension, Hypertensive kidney disease, malignant, Hypothyroid, Macular degeneration, Migraine, and Open-angle glaucoma.    Surgical History:   has a past surgical history that includes Cholecystectomy (4/2004); hip surgery (1/1991); Ureter stent placement (10/26/2006); Eye removal (1/1991); Mandible fracture surgery (1/1991); Cataract removal with implant (Right, 1/28/15); and Tonsillectomy.     Social History:   reports that he quit smoking about 43 years ago. His smoking use  fibrillation.    Right Ventricle: Right ventricle is mildly dilated. Normal systolic function.    Mitral Valve: Mildly calcified leaflet. Mild annular calcification of the mitral valve. Mild regurgitation.    Tricuspid Valve: Moderate regurgitation. RVSP: 52 mmHg.    Image quality is adequate. Contrast used: Definity.  Stress: none  Cath: nne  MRI/EP/Other: none    Old notes reviewed  Telemetry reviewd  Ekg personally reviewed  Chest xray personally reviewed  Echo, cath, and   Medications and labs reviewed  moderate complexity/medical decision making due to extensive data review, extensive history review, independent review of data  Moderate  risk due to acute illness, evaluation of drug-drug interactions, medication management and diagnostic interventions    Discussed with Dr. Sow     Assessment  Patient Active Problem List   Diagnosis    Essential hypertension    Pure hypercholesterolemia    Hypertensive kidney disease with stage 3 chronic kidney disease (HCC)    Calculus of kidney    Migraine    GERD (gastroesophageal reflux disease)    Hypothyroidism due to acquired atrophy of thyroid    BPH (benign prostatic hyperplasia)    Bilateral renal cysts    Anemia    Constipation    Vision impairment    Primary insomnia    Macular degeneration    Open-angle glaucoma    Vasomotor rhinitis    Pain in both feet    B12 deficiency    Numbness and tingling    Weight loss, unintentional    Major depressive disorder, single episode, unspecified    Nocturnal leg cramps    Atrial fibrillation with rapid ventricular response (HCC)    EFREN (acute kidney injury) (HCC)    Stage 3a chronic kidney disease (HCC)    Metabolic acidosis    Severe malnutrition (HCC)         Plan:    I had the opportunity to review the clinical symptoms and presentation of Lalo JAMES Moulton.     Assessment/Plan:  Atrial fibrillation  -[id number] of 4  Stable  Plan  - continue metoprolol  - family does not want patient on anticoagulant, accept risk of

## 2024-05-30 PROBLEM — N18.9 ACUTE KIDNEY INJURY SUPERIMPOSED ON CHRONIC KIDNEY DISEASE (HCC): Status: ACTIVE | Noted: 2024-05-27

## 2024-05-30 PROBLEM — R79.89 ELEVATED TROPONIN: Status: ACTIVE | Noted: 2024-05-30

## 2024-05-30 PROBLEM — I50.9 ACUTE CONGESTIVE HEART FAILURE (HCC): Status: ACTIVE | Noted: 2024-05-30

## 2024-05-30 LAB
ANION GAP SERPL CALCULATED.3IONS-SCNC: 16 MMOL/L (ref 3–16)
BASOPHILS # BLD: 0 K/UL (ref 0–0.2)
BASOPHILS NFR BLD: 0 %
BUN SERPL-MCNC: 23 MG/DL (ref 7–20)
CA-I BLD-SCNC: 1 MMOL/L (ref 1.12–1.32)
CALCIUM SERPL-MCNC: 8 MG/DL (ref 8.3–10.6)
CHLORIDE SERPL-SCNC: 100 MMOL/L (ref 99–110)
CO2 SERPL-SCNC: 23 MMOL/L (ref 21–32)
CREAT SERPL-MCNC: 2.2 MG/DL (ref 0.8–1.3)
EOSINOPHIL # BLD: 0.08 K/UL (ref 0–0.6)
EOSINOPHILS RELATIVE PERCENT: 1 %
ERYTHROCYTE [DISTWIDTH] IN BLOOD BY AUTOMATED COUNT: 15.5 % (ref 12.4–15.4)
GFR, ESTIMATED: 28 ML/MIN/1.73M2
GLUCOSE SERPL-MCNC: 83 MG/DL (ref 70–99)
HCT VFR BLD AUTO: 38.6 % (ref 40.5–52.5)
HGB BLD-MCNC: 13.1 G/DL (ref 13.5–17.5)
IMM GRANULOCYTES # BLD AUTO: 0.03 K/UL (ref 0–0.5)
IMM GRANULOCYTES NFR BLD: 0 %
LYMPHOCYTES NFR BLD: 0.44 K/UL (ref 1–5.1)
LYMPHOCYTES RELATIVE PERCENT: 6 %
MAGNESIUM SERPL-MCNC: 2 MG/DL (ref 1.8–2.4)
MCH RBC QN AUTO: 31 PG (ref 26–34)
MCHC RBC AUTO-ENTMCNC: 33.9 G/DL (ref 31–36)
MCV RBC AUTO: 91.5 FL (ref 80–100)
MICROORGANISM SPEC CULT: NORMAL
MICROORGANISM SPEC CULT: NORMAL
MONOCYTES NFR BLD: 0.66 K/UL (ref 0–1.3)
MONOCYTES NFR BLD: 10 %
NEUTROPHILS NFR BLD: 82 %
NEUTS SEG NFR BLD: 5.67 K/UL (ref 1.7–7.7)
PLATELET # BLD AUTO: 212 K/UL (ref 135–450)
PMV BLD AUTO: 10.1 FL (ref 9.4–12.4)
POTASSIUM SERPL-SCNC: 3.7 MMOL/L (ref 3.5–5.1)
RBC # BLD AUTO: 4.22 M/UL (ref 4.2–5.9)
SERVICE CMNT-IMP: NORMAL
SERVICE CMNT-IMP: NORMAL
SODIUM SERPL-SCNC: 138 MMOL/L (ref 136–145)
SPECIMEN DESCRIPTION: NORMAL
SPECIMEN DESCRIPTION: NORMAL
WBC OTHER # BLD: 6.9 K/UL (ref 4–11)

## 2024-05-30 PROCEDURE — 6360000002 HC RX W HCPCS: Performed by: INTERNAL MEDICINE

## 2024-05-30 PROCEDURE — 85025 COMPLETE CBC W/AUTO DIFF WBC: CPT

## 2024-05-30 PROCEDURE — 6370000000 HC RX 637 (ALT 250 FOR IP): Performed by: INTERNAL MEDICINE

## 2024-05-30 PROCEDURE — 92526 ORAL FUNCTION THERAPY: CPT

## 2024-05-30 PROCEDURE — 2580000003 HC RX 258: Performed by: INTERNAL MEDICINE

## 2024-05-30 PROCEDURE — 2500000003 HC RX 250 WO HCPCS: Performed by: NURSE PRACTITIONER

## 2024-05-30 PROCEDURE — 97535 SELF CARE MNGMENT TRAINING: CPT

## 2024-05-30 PROCEDURE — 36415 COLL VENOUS BLD VENIPUNCTURE: CPT

## 2024-05-30 PROCEDURE — 51701 INSERT BLADDER CATHETER: CPT

## 2024-05-30 PROCEDURE — 82330 ASSAY OF CALCIUM: CPT

## 2024-05-30 PROCEDURE — 99232 SBSQ HOSP IP/OBS MODERATE 35: CPT | Performed by: INTERNAL MEDICINE

## 2024-05-30 PROCEDURE — 51798 US URINE CAPACITY MEASURE: CPT

## 2024-05-30 PROCEDURE — 80048 BASIC METABOLIC PNL TOTAL CA: CPT

## 2024-05-30 PROCEDURE — 2060000000 HC ICU INTERMEDIATE R&B

## 2024-05-30 PROCEDURE — 83735 ASSAY OF MAGNESIUM: CPT

## 2024-05-30 PROCEDURE — 6370000000 HC RX 637 (ALT 250 FOR IP): Performed by: NURSE PRACTITIONER

## 2024-05-30 RX ORDER — ERGOCALCIFEROL 1.25 MG/1
50000 CAPSULE ORAL WEEKLY
Qty: 5 CAPSULE | Refills: 0 | Status: SHIPPED | OUTPATIENT
Start: 2024-05-30

## 2024-05-30 RX ORDER — SODIUM BICARBONATE 650 MG/1
650 TABLET ORAL DAILY
Qty: 30 TABLET | Refills: 0 | Status: SHIPPED | OUTPATIENT
Start: 2024-05-31

## 2024-05-30 RX ORDER — TAMSULOSIN HYDROCHLORIDE 0.4 MG/1
0.4 CAPSULE ORAL DAILY
Qty: 30 CAPSULE | Refills: 0 | Status: SHIPPED | OUTPATIENT
Start: 2024-05-30

## 2024-05-30 RX ORDER — TAMSULOSIN HYDROCHLORIDE 0.4 MG/1
0.4 CAPSULE ORAL DAILY
Status: DISCONTINUED | OUTPATIENT
Start: 2024-05-30 | End: 2024-05-31 | Stop reason: HOSPADM

## 2024-05-30 RX ORDER — LIDOCAINE HYDROCHLORIDE 20 MG/ML
JELLY TOPICAL PRN
Status: DISCONTINUED | OUTPATIENT
Start: 2024-05-30 | End: 2024-05-31 | Stop reason: HOSPADM

## 2024-05-30 RX ORDER — ISOSORBIDE MONONITRATE 30 MG/1
15 TABLET, EXTENDED RELEASE ORAL DAILY
Qty: 30 TABLET | Refills: 1 | Status: SHIPPED | OUTPATIENT
Start: 2024-05-31

## 2024-05-30 RX ORDER — LIDOCAINE 4 G/G
1 PATCH TOPICAL DAILY
Status: DISCONTINUED | OUTPATIENT
Start: 2024-05-30 | End: 2024-05-31 | Stop reason: HOSPADM

## 2024-05-30 RX ORDER — CALCIUM GLUCONATE 20 MG/ML
2000 INJECTION, SOLUTION INTRAVENOUS ONCE
Status: COMPLETED | OUTPATIENT
Start: 2024-05-30 | End: 2024-05-30

## 2024-05-30 RX ORDER — METOPROLOL TARTRATE 50 MG/1
50 TABLET, FILM COATED ORAL 2 TIMES DAILY
Qty: 60 TABLET | Refills: 1 | Status: SHIPPED | OUTPATIENT
Start: 2024-05-30

## 2024-05-30 RX ORDER — ERGOCALCIFEROL 1.25 MG/1
50000 CAPSULE ORAL WEEKLY
Status: DISCONTINUED | OUTPATIENT
Start: 2024-05-30 | End: 2024-05-31 | Stop reason: HOSPADM

## 2024-05-30 RX ADMIN — METOPROLOL TARTRATE 50 MG: 50 TABLET, FILM COATED ORAL at 20:59

## 2024-05-30 RX ADMIN — METOPROLOL TARTRATE 50 MG: 50 TABLET, FILM COATED ORAL at 10:18

## 2024-05-30 RX ADMIN — CALCIUM GLUCONATE 2000 MG: 20 INJECTION, SOLUTION INTRAVENOUS at 13:33

## 2024-05-30 RX ADMIN — SODIUM BICARBONATE 650 MG: 650 TABLET ORAL at 10:18

## 2024-05-30 RX ADMIN — HYDRALAZINE HYDROCHLORIDE 10 MG: 10 TABLET, FILM COATED ORAL at 06:14

## 2024-05-30 RX ADMIN — WATER 1000 MG: 1 INJECTION INTRAMUSCULAR; INTRAVENOUS; SUBCUTANEOUS at 16:22

## 2024-05-30 RX ADMIN — FUROSEMIDE 60 MG: 10 INJECTION, SOLUTION INTRAMUSCULAR; INTRAVENOUS at 19:09

## 2024-05-30 RX ADMIN — HEPARIN SODIUM 5000 UNITS: 5000 INJECTION INTRAVENOUS; SUBCUTANEOUS at 20:59

## 2024-05-30 RX ADMIN — ATORVASTATIN CALCIUM 20 MG: 20 TABLET, FILM COATED ORAL at 10:18

## 2024-05-30 RX ADMIN — HEPARIN SODIUM 5000 UNITS: 5000 INJECTION INTRAVENOUS; SUBCUTANEOUS at 06:14

## 2024-05-30 RX ADMIN — Medication 10 ML: at 20:59

## 2024-05-30 RX ADMIN — ERGOCALCIFEROL 50000 UNITS: 1.25 CAPSULE ORAL at 13:34

## 2024-05-30 RX ADMIN — LEVOTHYROXINE SODIUM 50 MCG: 25 TABLET ORAL at 10:18

## 2024-05-30 RX ADMIN — LIDOCAINE HYDROCHLORIDE: 20 JELLY TOPICAL at 12:44

## 2024-05-30 RX ADMIN — TAMSULOSIN HYDROCHLORIDE 0.4 MG: 0.4 CAPSULE ORAL at 13:34

## 2024-05-30 RX ADMIN — DOCUSATE SODIUM 100 MG: 100 CAPSULE, LIQUID FILLED ORAL at 20:59

## 2024-05-30 RX ADMIN — Medication 10 ML: at 10:19

## 2024-05-30 RX ADMIN — POLYETHYLENE GLYCOL 3350 17 G: 17 POWDER, FOR SOLUTION ORAL at 20:59

## 2024-05-30 RX ADMIN — ISOSORBIDE MONONITRATE 15 MG: 30 TABLET, EXTENDED RELEASE ORAL at 10:18

## 2024-05-30 RX ADMIN — Medication 3 MG: at 20:59

## 2024-05-30 RX ADMIN — MIRTAZAPINE 7.5 MG: 15 TABLET, FILM COATED ORAL at 20:59

## 2024-05-30 RX ADMIN — FUROSEMIDE 60 MG: 10 INJECTION, SOLUTION INTRAMUSCULAR; INTRAVENOUS at 10:18

## 2024-05-30 ASSESSMENT — PAIN SCALES - WONG BAKER: WONGBAKER_NUMERICALRESPONSE: NO HURT

## 2024-05-30 ASSESSMENT — PAIN SCALES - GENERAL
PAINLEVEL_OUTOF10: 0

## 2024-05-30 NOTE — PROGRESS NOTES
Hospitalist Progress Note      PCP: Yanira Melo MD    Date of Admission: 5/26/2024    Chief Complaint: Shortness of breath    Hospital Course:  Lalo Moulton is a 88 y.o.  male with past medical history of hypertension, chronic HFpEF, hyperlipidemia, CKD stage III, GERD, hypothyroidism, visually impaired/legally blind, came to the emergency room complaining of shortness of breath/dyspnea on exertion since past 7 days.  Reports mild productive cough.  Denies any fever, chills, chest pain, abdominal pain, nausea, vomiting, diarrhea or constipation.  Patient was last admitted at [facility] in January 2024 with confusion.  Briefly noted to have A-fib with RVR which spontaneously resolved.  Not on anticoagulation. Pt reported intermittent falls.    During his hospitalization patient was followed by cardiology and nephrology.    Subjective: Patient was evaluated bedside this morning.  He was up in bed completing his breakfast.  He does complain of a sore throat when he swallowing food.  Rapid strep test was ordered which was negative.  Patient was ordered phenol spray and lozenges for sore throat.  Patient does have a cough but this morning he declined cough suppressant.  He also complains of low abdominal pain had suprapubic tenderness with palpation.  Will obtain a bladder scan and straight cath as needed if he is retaining urine.    Assessment/Plan:    Afib RVR-rate controlled  [id number] score 4  -Defer anticoagulation due to patient's history of frequent falls.  Family does not want patient on blood thinners.  Elevated troponin  -5/29 -rate controlled A-fib heart rate in the 80s  -new onset  -tele, HR 80s   -limited echo performed, showed EF of 45-50%, unable to assess wall motion due tot rapid heart rate.   -cardiology consulted, continue lopressor 25mg po bid. Due to falls, risk of AC outweigh benefits at this time. Troponin elevation - not c/w ACS due to AF, HF      Acute on

## 2024-05-30 NOTE — PROGRESS NOTES
Speech Language Pathology  Dysphagia Treatment  Facility/Department: [facility] PROGRESSIVE CARE    NAME:Lalo Moulton  : 1935 (88 y.o.)   MRN: [id number]  ROOM: [address] [place] [address]  ADMISSION DATE: 2024  PATIENT DIAGNOSIS(ES): Elevated troponin [R79.89]  Atrial fibrillation with rapid ventricular response (HCC) [I48.91]  Acute kidney injury superimposed on chronic kidney disease (HCC) [N17.9, N18.9]  Atrial fibrillation, unspecified type (HCC) [I48.91]  Acute congestive heart failure, unspecified heart failure type (HCC) [I50.9]  Chief Complaint   Patient presents with    Shortness of Breath     SOB and HA for several days. Multiple other complaints.     Patient Active Problem List    Diagnosis Date Noted    Nocturnal leg cramps 10/28/2022    Acute congestive heart failure (HCC) 2024    Elevated troponin 2024    Acute kidney injury superimposed on chronic kidney disease (HCC) 2024    Stage 3a chronic kidney disease (HCC) 2024    Metabolic acidosis 2024    Severe malnutrition (HCC) 2024    Atrial fibrillation (HCC) 2024    Major depressive disorder, single episode, unspecified 03/10/2020    Weight loss, unintentional 10/26/2018    Numbness and tingling     B12 deficiency 10/27/2017    Pain in both feet 10/24/2017    Macular degeneration 2017    Open-angle glaucoma 2017    Vasomotor rhinitis 2017    Primary insomnia 2016    Vision impairment 2014    Constipation 2014    Anemia 2013    BPH (benign prostatic hyperplasia) 2011    Bilateral renal cysts 2011    Hypothyroidism due to acquired atrophy of thyroid 2011    Migraine 2010    GERD (gastroesophageal reflux disease) 2010    Essential hypertension     Pure hypercholesterolemia     Hypertensive kidney disease with stage 3 chronic kidney disease (HCC)     Calculus of kidney      Past Medical History:   Diagnosis Date    Alcohol dependence, in  room complaining of shortness of breath/dyspnea on exertion since past 7 days.  Reports mild productive cough.  Denies any fever, chills, chest pain, abdominal pain, nausea, vomiting, diarrhea or constipation.  Patient was last admitted at [facility] in January 2024 with confusion.  Briefly noted to have A-fib with RVR which spontaneously resolved.  Not on anticoagulation.  Spoke to patient's son Jorge who is at bedside.  He is HCPOA.  Patient does have advanced directive stating he is DNR.  Patient lives by himself and his daughter lives next-door.  Reported intermittent falls at home, including 1 this morning while putting on his pants.  No injury reported.\"    Recent Chest Radiography: [x] Chest XR   [] CT of Chest       Recent Neuro Radiography: [] MRI Brain    [] CT Head WO Contrast  [] CTA Head/Neck W Contrast   [x] N/A   CT CHEST WO CONTRAST   Final Result      1.  Moderate bilateral pleural effusion with bibasilar atelectasis.   2.  Coronary artery calcifications.         Electronically signed by Ronal Turner MD      XR CHEST PORTABLE   Final Result   1. Bibasilar airspace disease, likely similar to the prior study.      XR CHEST (2 VW)   Final Result      1. Mild bilateral basilar airspace disease and small pleural effusions.      Electronically signed by Sumeet Montiel MD          Pain: Pt reported pain with chewing. RN notified     SWALLOW ASSESSMENT:    Oral Care Status:    [x] Oral Care WFL  [] Poor oral care status  [] Edentulous  [] Upper Dentures  [] Lower Dentures  [] Missing/Broken Teeth  [x] Evidence of dental cavities/carries      Dysphagia Diagnosis: Mild oral stage dysphagia      Bedside Swallowing Evaluation Impression (5/28/2024)  RN and pt reported trouble swallowing pills this AM, and pt has had persistent cough throughout the day. Cough noted upon arrival prior to any PO trials. ROM of oral structures was WFL.  Pt able to cough and dry swallow on command. Pt analyzed with

## 2024-05-30 NOTE — PROGRESS NOTES
Occupational Therapy    [facility]       Occupational Therapy  [id number]  [facility] 3 PROGRESSIVE CARE    [] Initial Evaluation            [x] Daily Treatment Note         [] Discharge Summary      Patient: Lalo Moulton   : 1935   MRN: [id number]   Date of Service:  2024    Admitting Diagnosis:  Atrial fibrillation (HCC)  Referring Physician: Eduard Sow MD   Current Admission Summary: Per Dr. Chapa's note : \"Hospital Day: 1     Assessment and Plan:   Lalo Moulton is a 88 y.o.  male with past medical history of hypertension, chronic HFpEF, hyperlipidemia, CKD stage III, GERD, hypothyroidism, visually impaired/legally blind, came to the emergency room complaining of shortness of breath/dyspnea on exertion since past 7 days.     Upon arrival to emergency room, patient was noted to be in A-fib with RVR.  proBNP elevated at 20,083, troponin 58, 57.  Creatinine elevated at 2.5, bicarb 19, hemoglobin/hematocrit 13.1/39.5, platelet 213.     X-ray chest  1. Mild bilateral basilar airspace disease and small pleural effusions      Echo 2024  A bubble study was performed.   Left ventricular wall motion is normal.   Overall left ventricular ejection fraction is estimated to be 55-60%.   Diastolic function is impaired and classified as Grade 1 (impaired   relaxation).   Mitral valve leaflets are mildly calcified in appearance.   Aortic valve is mildly calcified.   Injection of agitated saline showed no interatrial shunt.   Right ventricular systolic pressure is moderately elevated at 45-55 mmHg.   No significant valvular abnormalities identified.   There is no obvious cardiac source of emboli noted.      Assessment     Atrial fibrillation with RVR-new onset  Acute on chronic HFpEF exacerbation  EFREN on CKD stage III  Elevated troponin-unlikely ACS.  Community-acquired bacterial pneumonia  S/p fall  Hypertension  Hyperlipidemia  GERD  Hypothyroidism  Visually

## 2024-05-30 NOTE — DISCHARGE SUMMARY
Hospital Medicine Discharge Summary    Patient ID: Lalo Moulton      Patient's PCP: Yanira Melo MD    Admit Date: 5/26/2024     Discharge Date:   05/31/2024    Admitting Physician: Eduard Sow MD     Discharge Physician: MARILYN Kraus - CNP     Discharge Diagnoses  Rate controlled atrial fibrillation  Combined congestive heart failure  CKD stage III  Urinary retention  Community-acquired pneumonia-treated while hospitalized  Dysphagia  Hypertension  Hyperlipidemia  GERD  Hypothyroidism  Protein calorie malnutrition-severe      Hospital Course: Lalo Moulton is a 88 y.o.  male with past medical history of hypertension, chronic HFpEF, hyperlipidemia, CKD stage III, GERD, hypothyroidism, visually impaired/legally blind, came to the emergency room complaining of shortness of breath/dyspnea on exertion since past 7 days.  Reports mild productive cough.  Denies any fever, chills, chest pain, abdominal pain, nausea, vomiting, diarrhea or constipation.  Patient was last admitted at [facility] in January 2024 with confusion.  Briefly noted to have A-fib with RVR which spontaneously resolved.  Not on anticoagulation. Pt reported intermittent falls.     During his hospitalization patient was followed by cardiology and nephrology.    Prior to discharge patient started to have urinary retention likely in the setting of diuretic use and history of BPH.  Added Flomax to patient's home medication regimen also referred to urology for follow-up.    Afib RVR-rate controlled  [id number] score 4  -Defer anticoagulation due to patient's history of frequent falls.  Family does not want patient on blood thinners.  -limited echo performed, showed EF of 45-50%, unable to assess wall motion due tot rapid heart rate.      Acute on Chronic systolic and diastolic exacerbation  Echo-EF 45 to 50%-indeterminate diastolic function due to A-fib.  Continue beta-blocker and Imdur.  Low-salt diet  Torsemide 40  plan.      Signed:    MARILYN Kraus - CNP   5/30/2024    The patient was seen and examined on day of discharge and this discharge summary is in conjunction with any daily progress note from day of discharge.    Thank you Yanira Melo MD for the opportunity to be involved in this patient's care. If you have any questions or concerns please feel free to contact me at [phone].    NOTE:  This report was transcribed using voice recognition software.  Every effort was made to ensure accuracy; however, inadvertent computerized transcription errors may be present.

## 2024-05-30 NOTE — PLAN OF CARE
HEART FAILURE CARE PLAN:    Comorbidities Reviewed: Yes   Patient has a past medical history of Alcohol dependence, in remission, BPH (benign prostatic hyperplasia), Calculus of gallbladder without mention of cholecystitis or obstruction, Calculus of kidney, Cataract, Colon polyp, Diverticulosis, GERD (gastroesophageal reflux disease), Head injury, Hemorrhoid, Hyperlipidemia, Hypertension, Hypertensive kidney disease, malignant, Hypothyroid, Macular degeneration, Migraine, and Open-angle glaucoma.     Weights Reviewed: Yes   Admission weight: 61.7 kg (136 lb)   Wt Readings from Last 3 Encounters:   05/29/24 60.1 kg (132 lb 6.4 oz)   11/03/23 54.3 kg (119 lb 12.8 oz)   10/28/22 57.9 kg (127 lb 9.6 oz)     Intake & Output Reviewed: Yes   No intake or output data in the 24 hours ending 05/29/24 2156    ECHOCARDIOGRAM Reviewed: Yes   Patient's Ejection Fraction (EF) is greater than 40%     Medications Reviewed: Yes   SCHEDULED HOSPITAL MEDICATIONS:   hydrALAZINE  10 mg Oral 3 times per day    isosorbide mononitrate  15 mg Oral Daily    furosemide  60 mg IntraVENous BID    metoprolol tartrate  25 mg Oral BID    sodium bicarbonate  650 mg Oral Daily    atorvastatin  20 mg Oral Daily    docusate sodium  100 mg Oral BID    levothyroxine  50 mcg Oral Daily    mirtazapine  7.5 mg Oral Nightly    sodium chloride flush  5-40 mL IntraVENous 2 times per day    heparin (porcine)  5,000 Units SubCUTAneous 3 times per day    cefTRIAXone (ROCEPHIN) IV  1,000 mg IntraVENous Q24H     HOME MEDICATIONS:  Prior to Admission medications    Medication Sig Start Date End Date Taking? Authorizing Provider   diphenhydrAMINE-APAP, sleep, (TYLENOL PM EXTRA STRENGTH PO) Take 2 tablets by mouth at bedtime   Yes Provider, MD Suri   benazepril (LOTENSIN) 20 MG tablet TAKE 1 TABLET BY MOUTH DAILY 5/6/24   Melo, Yanira TRINIDAD MD   mirtazapine (REMERON) 7.5 MG tablet TAKE 1 TABLET BY MOUTH EVERY NIGHT 3/5/24   Melo, Yanira TRINIDAD MD    atorvastatin (LIPITOR) 20 MG tablet TAKE 1 TABLET BY MOUTH DAILY 2/5/24   Kameron, Yanira TRINIDAD MD   levothyroxine (SYNTHROID) 50 MCG tablet TAKE 1 TABLET BY MOUTH DAILY 12/14/23   Kameron, Yanira TRINIDAD MD   guaiFENesin (MUCINEX) 600 MG extended release tablet Take 2 tablets by mouth 2 times daily as needed for Congestion 7/30/21   Kameron, Yanira RTINIDAD MD   docusate sodium (COLACE) 100 MG capsule Take 1 capsule by mouth at bedtime    ProviderSuri MD   vitamin B-12 (CYANOCOBALAMIN) 1000 MCG tablet Take 1 tablet by mouth daily  Patient not taking: Reported on 5/26/2024    ProviderSuri MD      Diet Reviewed: Yes   ADULT DIET; Regular; Low Sodium (2 gm)  ADULT ORAL NUTRITION SUPPLEMENT; Breakfast, Lunch, Dinner; Standard High Calorie/High Protein Oral Supplement    Goal of Care Reviewed: Yes   Patient and/or Family's stated Goal of Care this Admission: Reduce shortness of breath, increase activity tolerance, better understand heart failure and disease management, be more comfortable, and reduce lower extremity edema prior to discharge.     Electronically signed by JOSSIE MEEK RN on 5/29/2024 at 9:56 PM

## 2024-05-30 NOTE — FLOWSHEET NOTE
05/30/24 1257   Urine Assessment   Intermittent/Straight Cath (mL) 850 mL   $ Cath urethra straight $ Yes     CNP straight cathed pt with 14 F coude cath. 850 ml out.

## 2024-05-30 NOTE — PLAN OF CARE
Problem: Discharge Planning  Goal: Discharge to home or other facility with appropriate resources  5/29/2024 2137 by John Klein RN  Outcome: Progressing  5/29/2024 1530 by Dee Francois RN  Outcome: Progressing     Problem: Pain  Goal: Verbalizes/displays adequate comfort level or baseline comfort level  5/29/2024 2137 by John Klein RN  Outcome: Progressing  5/29/2024 1530 by Dee Francois RN  Outcome: Progressing     Problem: Safety - Adult  Goal: Free from fall injury  5/29/2024 2137 by John Klein RN  Outcome: Progressing  5/29/2024 1530 by Dee Francois RN  Outcome: Progressing     Problem: ABCDS Injury Assessment  Goal: Absence of physical injury  5/29/2024 2137 by John Klein RN  Outcome: Progressing  5/29/2024 1530 by Dee Francois RN  Outcome: Progressing     Problem: Skin/Tissue Integrity  Goal: Absence of new skin breakdown  Description: 1.  Monitor for areas of redness and/or skin breakdown  2.  Assess vascular access sites hourly  3.  Every 4-6 hours minimum:  Change oxygen saturation probe site  4.  Every 4-6 hours:  If on nasal continuous positive airway pressure, respiratory therapy assess nares and determine need for appliance change or resting period.  Outcome: Progressing     Problem: Cardiovascular - Adult  Goal: Maintains optimal cardiac output and hemodynamic stability  5/29/2024 2137 by John Klein RN  Outcome: Progressing  5/29/2024 1530 by Dee Francois RN  Outcome: Progressing  Goal: Absence of cardiac dysrhythmias or at baseline  5/29/2024 2137 by John Klein RN  Outcome: Progressing  5/29/2024 1530 by Dee Francois RN  Outcome: Progressing     Problem: Skin/Tissue Integrity - Adult  Goal: Skin integrity remains intact  5/29/2024 2137 by John Klein RN  Outcome: Progressing  5/29/2024 1530 by Dee Francois RN  Outcome: Progressing     Problem: Musculoskeletal - Adult  Goal: Return ADL status to a  safe level of function  5/29/2024 2137 by John Klein RN  Outcome: Progressing  5/29/2024 1530 by Dee Francois RN  Outcome: Progressing     Problem: Infection - Adult  Goal: Absence of infection at discharge  5/29/2024 2137 by John Klein RN  Outcome: Progressing  5/29/2024 1530 by Dee Francois RN  Outcome: Progressing     Problem: Nutrition Deficit:  Goal: Optimize nutritional status  5/29/2024 2137 by John Klein RN  Outcome: Progressing  5/29/2024 1530 by Dee Francois RN  Outcome: Progressing

## 2024-05-30 NOTE — PROGRESS NOTES
15 mg Oral Daily    furosemide  60 mg IntraVENous BID    sodium bicarbonate  650 mg Oral Daily    atorvastatin  20 mg Oral Daily    docusate sodium  100 mg Oral BID    levothyroxine  50 mcg Oral Daily    mirtazapine  7.5 mg Oral Nightly    sodium chloride flush  5-40 mL IntraVENous 2 times per day    heparin (porcine)  5,000 Units SubCUTAneous 3 times per day    cefTRIAXone (ROCEPHIN) IV  1,000 mg IntraVENous Q24H      sodium chloride       ipratropium 0.5 mg-albuterol 2.5 mg, phenol, Benzocaine-Menthol, melatonin, sodium chloride flush, sodium chloride, ondansetron **OR** ondansetron, polyethylene glycol, acetaminophen **OR** acetaminophen, perflutren lipid microspheres    Lab Data:  CBC:   Recent Labs     05/28/24  0552 05/29/24  0532 05/30/24  0351   WBC 5.1 6.6 6.9   HGB 13.4* 13.0* 13.1*   HCT 41.2 38.8* 38.6*   MCV 94.3 92.4 91.5    189 212     BMP:   Recent Labs     05/28/24 0552 05/29/24  0532 05/30/24  0351    137 138   K 3.8 3.3* 3.7    102 100   CO2 20* 20* 23   BUN 26* 26* 23*   CREATININE 2.4* 2.3* 2.2*     LIVER PROFILE: No results for input(s): \"AST\", \"ALT\", \"LIPASE\", \"AMYLASE\", \"BILIDIR\", \"BILITOT\", \"ALKPHOS\" in the last 72 hours.    Invalid input(s): \"ALB\"  PT/INR: No results for input(s): \"PROTIME\", \"INR\" in the last 72 hours.  APTT: No results for input(s): \"APTT\" in the last 72 hours.  BNP:  No results for input(s): \"BNP\" in the last 72 hours.  IMAGING:     Echo 5/28/24    Left Ventricle: Mildly reduced left ventricular systolic function with a visually estimated EF of 45 - 50%. Left ventricle size is normal. Normal wall thickness. Unable to assess wall motion given rapid heart rate. Indeterminate diastolic function due to atrial fibrillation.    Right Ventricle: Right ventricle is mildly dilated. Normal systolic function.    Mitral Valve: Mildly calcified leaflet. Mild annular calcification of the mitral valve. Mild regurgitation.    Tricuspid Valve: Moderate regurgitation.  RVSP: 52 mmHg.    Image quality is adequate. Contrast used: Definity.    CT chest 5/29/24  1.  Moderate bilateral pleural effusion with bibasilar atelectasis.  2.  Coronary artery calcifications.    Assessment/Plan:  Atrial fibrillation  -[id number] of 4  Stable  Plan  - continue metoprolol  -no plans for cardioversion  - declined OAC due to risk of bleeding. Cont bb for rate control     2. Cough-  - viral pneumonia? Pleural effusion  Worsening  Plan  - duonebs  - recommend ct chest and speech eval     3. Mildly reduced ejection fraction  - new problem, lver 45-50%  - acc c nyha iii  -LVEF difficult to assess due to tachycardia. LVEF 60% at [facility] 1/2024  -Reduced probably artifact of irregular rhythm  - mildly elevated troponin flat, not consistent with acs  Plan  - continue medical management  - cont betablocker   - No ace/arb/aldactone due to CKD  -stop hydralazine, ok for imdur     4. EFREN on ckd  - new problem  - baseline Cr 15 now 2.2 and stable  - IV lasix 60 mg bid per nephrology   Plan  - nephrology managing diuretic     Will sign off. FU as outpatient     Scribe's Attestation: This note was scribed in the presence of Dr. Kenyon HOLLEY by Whitley Martino RN.     I, Dr. Jude Prescott, personally performed the services described in this documentation, as scribed by the above signed scribe in my presence. It is both accurate and complete to my knowledge. I agree with the details independently gathered by the clinical support staff, while the remaining scribed note accurately describes my personal service to the patient.         Jude Prescott MD, 5/30/2024 9:44 AM

## 2024-05-30 NOTE — CARE COORDINATION
Discharge Note     Discharge order received.      Destination: [facility]   [address], [place] [id number]   [phone]   Transportation:   [facility] Preferred [phone] Awarded 2:30pm EDT       All case management needs met.      Comment: Spoke with pt daughter Candida to convey the above info. I also spoke with Radha at [facility] to convey above info.

## 2024-05-30 NOTE — DISCHARGE INSTR - COC
List   Diagnosis Code    Essential hypertension I10    Pure hypercholesterolemia E78.00    Hypertensive kidney disease with stage 3 chronic kidney disease (HCC) I12.9, N18.30    Calculus of kidney N20.0    Migraine G43.909    GERD (gastroesophageal reflux disease) K21.9    Hypothyroidism due to acquired atrophy of thyroid E03.4    BPH (benign prostatic hyperplasia) N40.0    Bilateral renal cysts N28.1    Anemia D64.9    Constipation K59.00    Vision impairment H54.7    Primary insomnia F51.01    Macular degeneration H35.30    Open-angle glaucoma H40.10X0    Vasomotor rhinitis J30.0    Pain in both feet M79.671, M79.672    B12 deficiency E53.8    Numbness and tingling R20.0, R20.2    Weight loss, unintentional R63.4    Major depressive disorder, single episode, unspecified F32.9    Nocturnal leg cramps G47.62    Atrial fibrillation (HCC) I48.91    Acute kidney injury superimposed on chronic kidney disease (HCC) N17.9, N18.9    Stage 3a chronic kidney disease (HCC) N18.31    Metabolic acidosis E87.20    Severe malnutrition (HCC) E43    Acute congestive heart failure (HCC) I50.9    Elevated troponin R79.89       Isolation/Infection:   Isolation            No Isolation          Patient Infection Status       None to display                     Nurse Assessment:  Last Vital Signs: /86   Pulse 90   Temp 97.7 °F (36.5 °C) (Oral)   Resp 18   Ht 1.626 m (5' 4\")   Wt 60.1 kg (132 lb 6.4 oz)   SpO2 97%   BMI 22.73 kg/m²     Last documented pain score (0-10 scale): Pain Level: 0  Last Weight:   Wt Readings from Last 1 Encounters:   05/29/24 60.1 kg (132 lb 6.4 oz)     Mental Status:  oriented and alert    IV Access:  - None    Nursing Mobility/ADLs:  Walking   Assisted  Transfer  Assisted  Bathing  Assisted  Dressing  Assisted  Toileting  Assisted  Feeding  Assisted  Med Admin  Assisted  Med Delivery   whole    Wound Care Documentation and Therapy:        Elimination:  Continence:   Bowel: Yes  Bladder:  transferring to Rehab): Good    Recommended Labs or Other Treatments After Discharge: [facility]     Physician Certification: I certify the above information and transfer of Lalo Moulton  is necessary for the continuing treatment of the diagnosis listed and that he requires Skilled Nursing Facility for greater 30 days.     Update Admission H&P: No change in H&P    PHYSICIAN SIGNATURE:  Electronically signed by MARILYN Kraus CNP on 5/30/24 at 1:29 PM EDT/ Dr. Eduard Sow

## 2024-05-30 NOTE — PROGRESS NOTES
Nephrology Progress Note                                                                                                                                                                                                                                                                                                                                                               Office : [phone]     Fax :[phone]    Patient's Name: Lalo Moulton      Reason for Consult:  EFREN   Requesting Physician:  Yanira Melo MD  Chief Complaint:    Chief Complaint   Patient presents with    Shortness of Breath     SOB and HA for several days. Multiple other complaints.       Assessment/Plan     # EFREN 2/2 CRS on CKD 3  Admitted with CHF failure 2/2 AF   Volume overload   Cr better w/ diuresis  Baseline Cr ~1.5  Plan:  Change to PO torsemide 40 daily at discharge  2. Strict I/O, monitor BMP daily   3. Avoid nephrotoxins     # HTN  Controlled   On BB, hydralazine, imdur, lasix   No meds changes    # Anemia    # Acid- base/ Electrolyte imbalance   Metabolic acidosis 2/2 EFREN - resolved   Resumed PO sodium bicarb   Monitor     # AF-RVR  Converted to sinus   Now on BB  Monitor HR  Cardiology consulted       History of Present Ilness:    Lalo Moulton is a 88 y.o. male with   PMH of hypertension, chronic HFpEF, hyperlipidemia, CKD stage III, GERD, hypothyroidism, visually impaired/legally blind,admitted for SOB. Pt was found to have:   Leg edema , Elevated BP, AF-RVR   BNP 20 k   Cr 2.5<--1.5  Today Cr down to 2.1  BC low 17   Hb 12.8   BP controlled on r.a      Interval hx:    Cr improved  Bicarb improved  BP's acceptable    No SOB     Past Medical History:   Diagnosis Date    Alcohol dependence, in remission     BPH (benign prostatic hyperplasia) 6/12/2011    Patient was evaluated by Dr Lim and recommended to undergo a TURP after having urodynamics done in his office. Patient no longer wants to follow with Dr Lim and  Daily  docusate sodium (COLACE) capsule 100 mg, BID  levothyroxine (SYNTHROID) tablet 50 mcg, Daily  mirtazapine (REMERON) tablet 7.5 mg, Nightly  sodium chloride flush 0.9 % injection 5-40 mL, 2 times per day  sodium chloride flush 0.9 % injection 5-40 mL, PRN  0.9 % sodium chloride infusion, PRN  ondansetron (ZOFRAN-ODT) disintegrating tablet 4 mg, Q8H PRN   Or  ondansetron (ZOFRAN) injection 4 mg, Q6H PRN  polyethylene glycol (GLYCOLAX) packet 17 g, Daily PRN  acetaminophen (TYLENOL) tablet 650 mg, Q6H PRN   Or  acetaminophen (TYLENOL) suppository 650 mg, Q6H PRN  heparin (porcine) injection 5,000 Units, 3 times per day  perflutren lipid microspheres (DEFINITY) injection 1.5 mL, ONCE PRN  cefTRIAXone (ROCEPHIN) 1,000 mg in sterile water 10 mL IV syringe, Q24H      Physical exam:     Vitals:  BP (!) 146/91   Pulse 82   Temp 97.7 °F (36.5 °C) (Oral)   Resp 17   Ht 1.626 m (5' 4\")   Wt 60.1 kg (132 lb 6.4 oz)   SpO2 100%   BMI 22.73 kg/m²   Constitutional:  OAA X3 NAD Yes  Skin: no rash, turgor wnl  Heent:  eomi, mmm  Neck: no bruits or jvd noted  Cardiovascular:  S1, S2 without m/r/g  Respiratory: course rales diffuse   Abdomen:  , soft, nt, nd  Ext:  lower extremity edema Yes  Psychiatric: mood and affect kirstin   Musculoskeletal:  Rom, muscular strength intact    Data:   Labs:  CBC:   Recent Labs     05/28/24  0552 05/29/24  0532 05/30/24  0351   WBC 5.1 6.6 6.9   HGB 13.4* 13.0* 13.1*    189 212       BMP:    Recent Labs     05/28/24  0552 05/29/24  0532 05/30/24  0351    137 138   K 3.8 3.3* 3.7    102 100   CO2 20* 20* 23   BUN 26* 26* 23*   CREATININE 2.4* 2.3* 2.2*   GLUCOSE 91 99 83       Ca/Mg/Phos:   Recent Labs     05/28/24  0552 05/29/24  0532 05/30/24  0351   CALCIUM 8.4 7.8* 8.0*   MG  --  1.5*  --        Hepatic:   No results for input(s): \"AST\", \"ALT\", \"BILITOT\", \"ALKPHOS\" in the last 72 hours.    Invalid input(s): \"ALB\"    Troponin: No results for input(s): \"TROPONINI\" in

## 2024-05-30 NOTE — PROGRESS NOTES
Discharge delayed.  Patient had urinary retention with over 800 mL of urine.  Coudé catheter was used with Uro-Jet to drain bladder.  Will bladder scan every 4 hours and patient has straight cath ordered as needed with parameters.  Encourage urination with bladder scan checks.  Added Flomax to patient regimen.

## 2024-05-31 VITALS
RESPIRATION RATE: 17 BRPM | OXYGEN SATURATION: 97 % | BODY MASS INDEX: 22.55 KG/M2 | TEMPERATURE: 97.5 F | WEIGHT: 132.1 LBS | HEIGHT: 64 IN | HEART RATE: 72 BPM | DIASTOLIC BLOOD PRESSURE: 72 MMHG | SYSTOLIC BLOOD PRESSURE: 105 MMHG

## 2024-05-31 LAB
ALBUMIN: 3 G/DL (ref 3.4–5)
ANION GAP SERPL CALCULATED.3IONS-SCNC: 15 MMOL/L (ref 3–16)
BASOPHILS # BLD: 0.01 K/UL (ref 0–0.2)
BASOPHILS NFR BLD: 0 %
BUN SERPL-MCNC: 24 MG/DL (ref 7–20)
CALCIUM SERPL-MCNC: 7.9 MG/DL (ref 8.3–10.6)
CHLORIDE SERPL-SCNC: 99 MMOL/L (ref 99–110)
CO2 SERPL-SCNC: 21 MMOL/L (ref 21–32)
CREAT SERPL-MCNC: 2.1 MG/DL (ref 0.8–1.3)
EOSINOPHIL # BLD: 0.15 K/UL (ref 0–0.6)
EOSINOPHILS RELATIVE PERCENT: 2 %
ERYTHROCYTE [DISTWIDTH] IN BLOOD BY AUTOMATED COUNT: 15.5 % (ref 12.4–15.4)
GFR, ESTIMATED: 29 ML/MIN/1.73M2
GLUCOSE SERPL-MCNC: 76 MG/DL (ref 70–99)
HCT VFR BLD AUTO: 35.1 % (ref 40.5–52.5)
HGB BLD-MCNC: 12 G/DL (ref 13.5–17.5)
IMM GRANULOCYTES # BLD AUTO: 0.11 K/UL (ref 0–0.5)
IMM GRANULOCYTES NFR BLD: 2 %
LYMPHOCYTES NFR BLD: 0.58 K/UL (ref 1–5.1)
LYMPHOCYTES RELATIVE PERCENT: 8 %
MCH RBC QN AUTO: 31.7 PG (ref 26–34)
MCHC RBC AUTO-ENTMCNC: 34.2 G/DL (ref 31–36)
MCV RBC AUTO: 92.6 FL (ref 80–100)
MONOCYTES NFR BLD: 1.09 K/UL (ref 0–1.3)
MONOCYTES NFR BLD: 15 %
NEUTROPHILS NFR BLD: 73 %
NEUTS SEG NFR BLD: 5.33 K/UL (ref 1.7–7.7)
PHOSPHATE SERPL-MCNC: 4.7 MG/DL (ref 2.5–4.9)
PLATELET # BLD AUTO: 179 K/UL (ref 135–450)
PMV BLD AUTO: 9.5 FL
POTASSIUM SERPL-SCNC: 3.7 MMOL/L (ref 3.5–5.1)
RBC # BLD AUTO: 3.79 M/UL (ref 4.2–5.9)
SODIUM SERPL-SCNC: 135 MMOL/L (ref 136–145)
WBC OTHER # BLD: 7.3 K/UL (ref 4–11)

## 2024-05-31 PROCEDURE — 6370000000 HC RX 637 (ALT 250 FOR IP): Performed by: INTERNAL MEDICINE

## 2024-05-31 PROCEDURE — 85025 COMPLETE CBC W/AUTO DIFF WBC: CPT

## 2024-05-31 PROCEDURE — 92526 ORAL FUNCTION THERAPY: CPT

## 2024-05-31 PROCEDURE — 6360000002 HC RX W HCPCS: Performed by: INTERNAL MEDICINE

## 2024-05-31 PROCEDURE — 6370000000 HC RX 637 (ALT 250 FOR IP): Performed by: NURSE PRACTITIONER

## 2024-05-31 PROCEDURE — 2580000003 HC RX 258: Performed by: INTERNAL MEDICINE

## 2024-05-31 PROCEDURE — 80069 RENAL FUNCTION PANEL: CPT

## 2024-05-31 PROCEDURE — 36415 COLL VENOUS BLD VENIPUNCTURE: CPT

## 2024-05-31 RX ORDER — LIDOCAINE HYDROCHLORIDE 20 MG/ML
JELLY TOPICAL PRN
Status: DISCONTINUED | OUTPATIENT
Start: 2024-05-31 | End: 2024-05-31 | Stop reason: SDUPTHER

## 2024-05-31 RX ADMIN — DOCUSATE SODIUM 100 MG: 100 CAPSULE, LIQUID FILLED ORAL at 09:35

## 2024-05-31 RX ADMIN — HEPARIN SODIUM 5000 UNITS: 5000 INJECTION INTRAVENOUS; SUBCUTANEOUS at 05:54

## 2024-05-31 RX ADMIN — METOPROLOL TARTRATE 50 MG: 50 TABLET, FILM COATED ORAL at 09:35

## 2024-05-31 RX ADMIN — Medication 10 ML: at 09:35

## 2024-05-31 RX ADMIN — ATORVASTATIN CALCIUM 20 MG: 20 TABLET, FILM COATED ORAL at 09:34

## 2024-05-31 RX ADMIN — FUROSEMIDE 60 MG: 10 INJECTION, SOLUTION INTRAMUSCULAR; INTRAVENOUS at 09:36

## 2024-05-31 RX ADMIN — LEVOTHYROXINE SODIUM 50 MCG: 25 TABLET ORAL at 09:35

## 2024-05-31 RX ADMIN — TAMSULOSIN HYDROCHLORIDE 0.4 MG: 0.4 CAPSULE ORAL at 09:35

## 2024-05-31 RX ADMIN — ISOSORBIDE MONONITRATE 15 MG: 30 TABLET, EXTENDED RELEASE ORAL at 09:35

## 2024-05-31 RX ADMIN — LIDOCAINE HYDROCHLORIDE: 20 JELLY TOPICAL at 11:50

## 2024-05-31 RX ADMIN — SODIUM BICARBONATE 650 MG: 650 TABLET ORAL at 09:35

## 2024-05-31 ASSESSMENT — PAIN SCALES - WONG BAKER: WONGBAKER_NUMERICALRESPONSE: NO HURT

## 2024-05-31 ASSESSMENT — PAIN SCALES - GENERAL
PAINLEVEL_OUTOF10: 0

## 2024-05-31 NOTE — PROGRESS NOTES
Comprehensive Nutrition Assessment    Type and Reason for Visit:  Positive Nutrition Screen    Nutrition Recommendations/Plan:   Liberalize diet to encourage PO intakes.  Continue Ensure TID, likes vanilla.     Malnutrition Assessment:  Malnutrition Status:  Severe malnutrition (05/27/24 0918)    Context:  Chronic Illness     Findings of the 6 clinical characteristics of malnutrition:  Energy Intake:  Mild decrease in energy intake (Comment)  Weight Loss:  Unable to assess     Body Fat Loss:  Severe body fat loss Orbital, Buccal region   Muscle Mass Loss:  Severe muscle mass loss Temples (temporalis), Calf (gastrocnemius)  Fluid Accumulation:  Mild Extremities   Strength:  Not Performed    Nutrition Assessment:    Follow-up. Pt working with SLP at time of visit. Pt reports appetite has been \"blah\". Pt testing Ensure drink with SLP and reports he likes it, eating Divehi toast also. Will liberlize diet to encourage PO intakes. Continue Ensure TID.    Nutrition Related Findings:    +1 BLE edema; generalized non pitting edema; BM 5/30; -3.9 L fluid; Na 135 Wound Type: None       Current Nutrition Intake & Therapies:    Average Meal Intake: 0%, 1-25%, 26-50%  Average Supplements Intake: 51-75%  ADULT ORAL NUTRITION SUPPLEMENT; Breakfast, Lunch, Dinner; Standard High Calorie/High Protein Oral Supplement  ADULT DIET; Dysphagia - Soft and Bite Sized; Low Sodium (2 gm)    Anthropometric Measures:  Height: 162.6 cm (5' 4\")  Ideal Body Weight (IBW): 130 lbs (59 kg)       Current Body Weight: 59.9 kg (132 lb 0.9 oz), 101.6 % IBW. Weight Source: Bed Scale  Current BMI (kg/m2): 22.7                          BMI Categories: Normal Weight (BMI 18.5-24.9)    Estimated Daily Nutrient Needs:  Energy Requirements Based On: Kcal/kg  Weight Used for Energy Requirements: Current  Energy (kcal/day): 9269-5887 kcal (25-30 kcal/kg 62 kg CBW)  Weight Used for Protein Requirements: Current  Protein (g/day): 62-74 gm (1-1.2 gm/kg 62 kg

## 2024-05-31 NOTE — CARE COORDINATION
Discharge Note      Discharge order received.       Destination: [facility]   [address], [place] [id number]   [phone]   Transportation:   [facility] Transport Preferred [phone] Awarded 2:00pm EDT         All case management needs met.        Comment: Spoke with pt daughter Candida to convey the above info. I also spoke with Radha at [facility] to convey above info.

## 2024-05-31 NOTE — PROGRESS NOTES
Report called to Vanna at the [facility]. Pt discharged with medical transport, family following to facility. Pt discharged with coude catheter, drained and reported to facilty also .

## 2024-05-31 NOTE — FLOWSHEET NOTE
05/30/24 1919 05/30/24 2005   Output (mL)   Urine 50 mL  --    Urine Assessment   Bladder Scan Volume (mL) 398 mL  --    Intermittent/Straight Cath (mL)  --  550 mL   $ Cath urethra straight  --  $ Yes     Pt voided 50 ml into urinal. Pt bladder scanned. Pt straight cathed with 14 F coude draining 550 ml urine per orders.

## 2024-05-31 NOTE — PROGRESS NOTES
Nephrology Progress Note                                                                                                                                                                                                                                                                                                                                                               Office : [phone]     Fax :[phone]    Patient's Name: Lalo Moulton      Reason for Consult:  EFREN   Requesting Physician:  Yanira Melo MD  Chief Complaint:    Chief Complaint   Patient presents with    Shortness of Breath     SOB and HA for several days. Multiple other complaints.       Assessment/Plan     # EFREN 2/2 CRS on CKD 3  Admitted with CHF failure 2/2 AF   Volume overload   Cr better w/ diuresis  Baseline Cr ~1.5  Plan:  Change to PO torsemide 40 daily at discharge  2. Strict I/O, monitor BMP daily   3. Avoid nephrotoxins     # HTN  Controlled   Can use ACE   Hold Hydralazine     # Anemia    # Acid- base/ Electrolyte imbalance   Metabolic acidosis 2/2 EFREN - resolved   Resumed PO sodium bicarb   Monitor     # AF-RVR  Converted to sinus   Now on BB  Monitor HR  Cardiology consulted       History of Present Ilness:    Lalo Moulton is a 88 y.o. male with   PMH of hypertension, chronic HFpEF, hyperlipidemia, CKD stage III, GERD, hypothyroidism, visually impaired/legally blind,admitted for SOB. Pt was found to have:   Leg edema , Elevated BP, AF-RVR   BNP 20 k   Cr 2.5<--1.5  Today Cr down to 2.1  BC low 17   Hb 12.8   BP controlled on r.a      Interval hx:    Cr improved  Bicarb improved  BP's acceptable    No SOB     Past Medical History:   Diagnosis Date    Alcohol dependence, in remission     BPH (benign prostatic hyperplasia) 6/12/2011    Patient was evaluated by Dr Lim and recommended to undergo a TURP after having urodynamics done in his office. Patient no longer wants to follow with Dr Lim and wishes to return to  bicarbonate tablet 650 mg, Daily  melatonin tablet 3 mg, Nightly PRN  atorvastatin (LIPITOR) tablet 20 mg, Daily  docusate sodium (COLACE) capsule 100 mg, BID  levothyroxine (SYNTHROID) tablet 50 mcg, Daily  mirtazapine (REMERON) tablet 7.5 mg, Nightly  sodium chloride flush 0.9 % injection 5-40 mL, 2 times per day  sodium chloride flush 0.9 % injection 5-40 mL, PRN  0.9 % sodium chloride infusion, PRN  ondansetron (ZOFRAN-ODT) disintegrating tablet 4 mg, Q8H PRN   Or  ondansetron (ZOFRAN) injection 4 mg, Q6H PRN  polyethylene glycol (GLYCOLAX) packet 17 g, Daily PRN  acetaminophen (TYLENOL) tablet 650 mg, Q6H PRN   Or  acetaminophen (TYLENOL) suppository 650 mg, Q6H PRN  heparin (porcine) injection 5,000 Units, 3 times per day  perflutren lipid microspheres (DEFINITY) injection 1.5 mL, ONCE PRN      Physical exam:     Vitals:  /72   Pulse 72   Temp 97.5 °F (36.4 °C) (Axillary)   Resp 17   Ht 1.626 m (5' 4\")   Wt 59.9 kg (132 lb 1.6 oz)   SpO2 97%   BMI 22.67 kg/m²   Constitutional:  OAA X3 NAD Yes  Skin: no rash, turgor wnl  Heent:  eomi, mmm  Neck: no bruits or jvd noted  Cardiovascular:  S1, S2 without m/r/g  Respiratory: course rales diffuse   Abdomen:  , soft, nt, nd  Ext:  lower extremity edema Yes  Psychiatric: mood and affect kirstin   Musculoskeletal:  Rom, muscular strength intact    Data:   Labs:  CBC:   Recent Labs     05/29/24  0532 05/30/24  0351 05/31/24  0434   WBC 6.6 6.9 7.3   HGB 13.0* 13.1* 12.0*    212 179       BMP:    Recent Labs     05/29/24  0532 05/30/24  0351 05/31/24  0703    138 135*   K 3.3* 3.7 3.7    100 99   CO2 20* 23 21   BUN 26* 23* 24*   CREATININE 2.3* 2.2* 2.1*   GLUCOSE 99 83 76       Ca/Mg/Phos:   Recent Labs     05/29/24  0532 05/30/24  0351 05/30/24  0932 05/31/24  0703   CALCIUM 7.8* 8.0*  --  7.9*   MG 1.5*  --  2.0  --    PHOS  --   --   --  4.7       Hepatic:   No results for input(s): \"AST\", \"ALT\", \"BILITOT\", \"ALKPHOS\" in the last 72

## 2024-05-31 NOTE — PROGRESS NOTES
Speech Language Pathology  Dysphagia Treatment  Facility/Department: [facility] PROGRESSIVE CARE    NAME:Lalo Moulton  : 1935 (88 y.o.)   MRN: [id number]  ROOM: [address] [place] [address]  ADMISSION DATE: 2024  PATIENT DIAGNOSIS(ES): Elevated troponin [R79.89]  Atrial fibrillation with rapid ventricular response (HCC) [I48.91]  Acute kidney injury superimposed on chronic kidney disease (HCC) [N17.9, N18.9]  Atrial fibrillation, unspecified type (HCC) [I48.91]  Acute congestive heart failure, unspecified heart failure type (HCC) [I50.9]  Chief Complaint   Patient presents with    Shortness of Breath     SOB and HA for several days. Multiple other complaints.     Patient Active Problem List    Diagnosis Date Noted    Nocturnal leg cramps 10/28/2022    Acute congestive heart failure (HCC) 2024    Elevated troponin 2024    Acute kidney injury superimposed on chronic kidney disease (HCC) 2024    Stage 3a chronic kidney disease (HCC) 2024    Metabolic acidosis 2024    Severe malnutrition (HCC) 2024    Atrial fibrillation (HCC) 2024    Major depressive disorder, single episode, unspecified 03/10/2020    Weight loss, unintentional 10/26/2018    Numbness and tingling     B12 deficiency 10/27/2017    Pain in both feet 10/24/2017    Macular degeneration 2017    Open-angle glaucoma 2017    Vasomotor rhinitis 2017    Primary insomnia 2016    Vision impairment 2014    Constipation 2014    Anemia 2013    BPH (benign prostatic hyperplasia) 2011    Bilateral renal cysts 2011    Hypothyroidism due to acquired atrophy of thyroid 2011    Migraine 2010    GERD (gastroesophageal reflux disease) 2010    Essential hypertension     Pure hypercholesterolemia     Hypertensive kidney disease with stage 3 chronic kidney disease (HCC)     Calculus of kidney      Past Medical History:   Diagnosis Date    Alcohol dependence, in

## 2024-05-31 NOTE — PLAN OF CARE
Problem: Discharge Planning  Goal: Discharge to home or other facility with appropriate resources  Outcome: Progressing  Flowsheets (Taken 5/30/2024 2000)  Discharge to home or other facility with appropriate resources:   Identify barriers to discharge with patient and caregiver   Arrange for needed discharge resources and transportation as appropriate     Problem: Pain  Goal: Verbalizes/displays adequate comfort level or baseline comfort level  Outcome: Progressing     Problem: Safety - Adult  Goal: Free from fall injury  Outcome: Progressing     Problem: ABCDS Injury Assessment  Goal: Absence of physical injury  Outcome: Progressing     Problem: Skin/Tissue Integrity  Goal: Absence of new skin breakdown  Description: 1.  Monitor for areas of redness and/or skin breakdown  2.  Assess vascular access sites hourly  3.  Every 4-6 hours minimum:  Change oxygen saturation probe site  4.  Every 4-6 hours:  If on nasal continuous positive airway pressure, respiratory therapy assess nares and determine need for appliance change or resting period.  Outcome: Progressing     Problem: Cardiovascular - Adult  Goal: Maintains optimal cardiac output and hemodynamic stability  Outcome: Progressing  Flowsheets (Taken 5/30/2024 2000)  Maintains optimal cardiac output and hemodynamic stability:   Monitor blood pressure and heart rate   Monitor urine output and notify Licensed Independent Practitioner for values outside of normal range  Goal: Absence of cardiac dysrhythmias or at baseline  Outcome: Progressing  Flowsheets (Taken 5/30/2024 2000)  Absence of cardiac dysrhythmias or at baseline:   Monitor cardiac rate and rhythm   Assess for signs of decreased cardiac output     Problem: Skin/Tissue Integrity - Adult  Goal: Skin integrity remains intact  Outcome: Progressing  Flowsheets  Taken 5/30/2024 2000 by Ciera Mcfarland RN  Skin Integrity Remains Intact: Monitor for areas of redness and/or skin breakdown  Taken 5/30/2024  0918 by Ariana Vasquez, RN  Skin Integrity Remains Intact: Monitor for areas of redness and/or skin breakdown     Problem: Musculoskeletal - Adult  Goal: Return ADL status to a safe level of function  Outcome: Progressing  Flowsheets (Taken 5/30/2024 2000)  Return ADL Status to a Safe Level of Function:   Administer medication as ordered   Assess activities of daily living deficits and provide assistive devices as needed   Obtain physical therapy/occupational therapy consults as needed   Assist and instruct patient to increase activity and self care as tolerated     Problem: Infection - Adult  Goal: Absence of infection at discharge  Outcome: Progressing  Flowsheets (Taken 5/30/2024 2000)  Absence of infection at discharge:   Assess and monitor for signs and symptoms of infection   Monitor lab/diagnostic results   Monitor all insertion sites i.e., indwelling lines, tubes and drains     Problem: Nutrition Deficit:  Goal: Optimize nutritional status  Outcome: Progressing

## 2024-06-01 LAB
MICROORGANISM SPEC CULT: NORMAL
SPECIMEN DESCRIPTION: NORMAL

## 2024-06-03 LAB
EKG DIAGNOSIS: NORMAL
EKG Q-T INTERVAL: 262 MS
EKG QRS DURATION: 72 MS
EKG QTC CALCULATION (BAZETT): 401 MS
EKG R AXIS: 15 DEGREES
EKG T AXIS: -6 DEGREES
EKG VENTRICULAR RATE: 141 BPM

## 2024-06-05 NOTE — PROGRESS NOTES
Physician Progress Note      PATIENT:               BRIANA POZO  CSN #:                  [id number]  :                       1935  ADMIT DATE:       2024 10:10 AM  DISCH DATE:        2024 2:19 PM  RESPONDING  PROVIDER #:        ALIYAH CACERES          QUERY TEXT:    Patient admitted with CHF exacerbation. Noted to have met ASPEN criteria for   severe malnutrition per  Dietary consult documentation.  Severe   malnutrition only listed under active problem list without current date.  If   possible, please document in progress notes and discharge summary if you are   evaluating and /or treating any of the following:    The medical record reflects the following:  Risk Factors: Per Dietary: Pt had reported decreased appetite. Noted pt with   Mod/severe fat and muscle wasting.  Clinical Indicators: Per Dietary: Energy Intake:  Mild decrease in energy   intake  Body Fat Loss:  Severe body fat loss Orbital, Buccal region  Muscle Mass Loss:  Severe muscle mass loss Temples (temporalis), Calf   (gastrocnemius)  Fluid Accumulation:  Mild Extremities  Treatment: Dietary consult, I&Os, wts    ASPEN Criteria:    https://aspenjournals.onlinelibrary.diallo.com/doi/full/10.1177/[email or web address]  5  Options provided:  -- Protein calorie malnutrition severe  -- Other - I will add my own diagnosis  -- Disagree - Not applicable / Not valid  -- Disagree - Clinically unable to determine / Unknown  -- Refer to Clinical Documentation Reviewer    PROVIDER RESPONSE TEXT:    This patient has severe protein calorie malnutrition.    Query created by: Clif Motta on 2024 10:11 AM      Electronically signed by:  ALIYAH CACERES 2024 6:36 AM

## 2024-06-29 PROBLEM — R79.89 ELEVATED TROPONIN: Status: RESOLVED | Noted: 2024-05-30 | Resolved: 2024-06-29
